# Patient Record
Sex: FEMALE | Race: WHITE | NOT HISPANIC OR LATINO | Employment: OTHER | URBAN - METROPOLITAN AREA
[De-identification: names, ages, dates, MRNs, and addresses within clinical notes are randomized per-mention and may not be internally consistent; named-entity substitution may affect disease eponyms.]

---

## 2023-06-19 PROBLEM — K63.5 COLON POLYPS: Status: ACTIVE | Noted: 2023-06-19

## 2023-06-19 PROBLEM — M47.26 OSTEOARTHRITIS OF SPINE WITH RADICULOPATHY, LUMBAR REGION: Status: ACTIVE | Noted: 2023-06-19

## 2023-06-19 PROBLEM — M85.80 OSTEOPENIA: Status: ACTIVE | Noted: 2023-06-19

## 2023-06-19 PROBLEM — F51.01 INSOMNIA, IDIOPATHIC: Status: ACTIVE | Noted: 2023-06-19

## 2023-06-19 PROBLEM — I25.10 ASCVD (ARTERIOSCLEROTIC CARDIOVASCULAR DISEASE): Status: ACTIVE | Noted: 2023-06-19

## 2023-06-19 PROBLEM — G62.9 PERIPHERAL NEUROPATHY: Status: ACTIVE | Noted: 2023-06-19

## 2023-06-19 PROBLEM — N18.31 CKD STAGE G3A/A1, GFR 45-59 AND ALBUMIN CREATININE RATIO <30 MG/G (MULTI): Status: ACTIVE | Noted: 2023-06-19

## 2023-06-19 PROBLEM — I10 HYPERTENSION: Status: ACTIVE | Noted: 2023-06-19

## 2023-06-19 PROBLEM — K21.9 GERD (GASTROESOPHAGEAL REFLUX DISEASE): Status: ACTIVE | Noted: 2023-06-19

## 2023-06-19 PROBLEM — I10 BENIGN ESSENTIAL HYPERTENSION: Status: ACTIVE | Noted: 2023-06-19

## 2023-06-19 PROBLEM — M18.12 ARTHRITIS OF CARPOMETACARPAL (CMC) JOINT OF LEFT THUMB: Status: ACTIVE | Noted: 2023-06-19

## 2023-06-19 PROBLEM — E78.5 HYPERLIPIDEMIA: Status: ACTIVE | Noted: 2023-06-19

## 2023-06-19 PROBLEM — M48.00 MULTILEVEL FORAMINAL STENOSIS: Status: ACTIVE | Noted: 2023-06-19

## 2023-06-19 PROBLEM — F32.5 DEPRESSION, MAJOR, IN REMISSION (CMS-HCC): Status: ACTIVE | Noted: 2023-06-19

## 2023-06-19 PROBLEM — H81.10 BPV (BENIGN POSITIONAL VERTIGO): Status: ACTIVE | Noted: 2023-06-19

## 2023-06-19 PROBLEM — M72.2 PLANTAR FASCIITIS OF RIGHT FOOT: Status: ACTIVE | Noted: 2023-06-19

## 2023-06-19 RX ORDER — MELOXICAM 15 MG/1
1 TABLET ORAL DAILY
COMMUNITY
Start: 2014-02-14 | End: 2023-07-18 | Stop reason: SDUPTHER

## 2023-06-19 RX ORDER — ESCITALOPRAM OXALATE 20 MG/1
1 TABLET ORAL DAILY
COMMUNITY
Start: 2020-10-01 | End: 2023-12-19 | Stop reason: SDUPTHER

## 2023-06-19 RX ORDER — LISINOPRIL AND HYDROCHLOROTHIAZIDE 10; 12.5 MG/1; MG/1
1 TABLET ORAL DAILY
COMMUNITY
Start: 2019-09-17 | End: 2023-09-11

## 2023-06-19 RX ORDER — ASCORBIC ACID 500 MG
1 TABLET ORAL DAILY
COMMUNITY

## 2023-06-19 RX ORDER — MECLIZINE HYDROCHLORIDE 25 MG/1
TABLET ORAL
COMMUNITY
Start: 2022-06-21 | End: 2024-04-02 | Stop reason: ALTCHOICE

## 2023-06-19 RX ORDER — ACETAMINOPHEN 500 MG
TABLET ORAL
COMMUNITY
Start: 2020-12-01

## 2023-06-19 RX ORDER — OMEPRAZOLE 20 MG/1
1 CAPSULE, DELAYED RELEASE ORAL 2 TIMES DAILY
COMMUNITY
Start: 2014-02-26 | End: 2023-07-18 | Stop reason: SDUPTHER

## 2023-06-19 RX ORDER — ATORVASTATIN CALCIUM 40 MG/1
1 TABLET, FILM COATED ORAL DAILY
COMMUNITY
Start: 2014-05-06 | End: 2023-07-18 | Stop reason: SDUPTHER

## 2023-06-19 RX ORDER — MULTIVITAMIN
1 TABLET ORAL DAILY
COMMUNITY

## 2023-06-19 RX ORDER — NABUMETONE 500 MG/1
1 TABLET, FILM COATED ORAL 2 TIMES DAILY
COMMUNITY
Start: 2022-06-21 | End: 2024-04-02 | Stop reason: ALTCHOICE

## 2023-06-20 ENCOUNTER — OFFICE VISIT (OUTPATIENT)
Dept: PRIMARY CARE | Facility: CLINIC | Age: 73
End: 2023-06-20
Payer: MEDICARE

## 2023-06-20 VITALS — WEIGHT: 212 LBS | SYSTOLIC BLOOD PRESSURE: 100 MMHG | BODY MASS INDEX: 40.06 KG/M2 | DIASTOLIC BLOOD PRESSURE: 70 MMHG

## 2023-06-20 DIAGNOSIS — F32.5 DEPRESSION, MAJOR, IN REMISSION (CMS-HCC): ICD-10-CM

## 2023-06-20 DIAGNOSIS — E78.5 HYPERLIPIDEMIA, UNSPECIFIED HYPERLIPIDEMIA TYPE: ICD-10-CM

## 2023-06-20 DIAGNOSIS — Z00.00 ROUTINE GENERAL MEDICAL EXAMINATION AT HEALTH CARE FACILITY: Primary | ICD-10-CM

## 2023-06-20 DIAGNOSIS — Z12.11 SCREENING FOR COLORECTAL CANCER: ICD-10-CM

## 2023-06-20 DIAGNOSIS — Z12.12 SCREENING FOR COLORECTAL CANCER: ICD-10-CM

## 2023-06-20 DIAGNOSIS — I10 HYPERTENSION, UNSPECIFIED TYPE: ICD-10-CM

## 2023-06-20 DIAGNOSIS — Z00.00 MEDICARE ANNUAL WELLNESS VISIT, SUBSEQUENT: ICD-10-CM

## 2023-06-20 PROCEDURE — 3074F SYST BP LT 130 MM HG: CPT | Performed by: STUDENT IN AN ORGANIZED HEALTH CARE EDUCATION/TRAINING PROGRAM

## 2023-06-20 PROCEDURE — G0439 PPPS, SUBSEQ VISIT: HCPCS | Performed by: STUDENT IN AN ORGANIZED HEALTH CARE EDUCATION/TRAINING PROGRAM

## 2023-06-20 PROCEDURE — 3078F DIAST BP <80 MM HG: CPT | Performed by: STUDENT IN AN ORGANIZED HEALTH CARE EDUCATION/TRAINING PROGRAM

## 2023-06-20 PROCEDURE — 1160F RVW MEDS BY RX/DR IN RCRD: CPT | Performed by: STUDENT IN AN ORGANIZED HEALTH CARE EDUCATION/TRAINING PROGRAM

## 2023-06-20 PROCEDURE — 99203 OFFICE O/P NEW LOW 30 MIN: CPT | Performed by: STUDENT IN AN ORGANIZED HEALTH CARE EDUCATION/TRAINING PROGRAM

## 2023-06-20 PROCEDURE — 1159F MED LIST DOCD IN RCRD: CPT | Performed by: STUDENT IN AN ORGANIZED HEALTH CARE EDUCATION/TRAINING PROGRAM

## 2023-06-20 PROCEDURE — 1170F FXNL STATUS ASSESSED: CPT | Performed by: STUDENT IN AN ORGANIZED HEALTH CARE EDUCATION/TRAINING PROGRAM

## 2023-06-20 PROCEDURE — 1036F TOBACCO NON-USER: CPT | Performed by: STUDENT IN AN ORGANIZED HEALTH CARE EDUCATION/TRAINING PROGRAM

## 2023-06-20 PROCEDURE — 3008F BODY MASS INDEX DOCD: CPT | Performed by: STUDENT IN AN ORGANIZED HEALTH CARE EDUCATION/TRAINING PROGRAM

## 2023-06-20 ASSESSMENT — PATIENT HEALTH QUESTIONNAIRE - PHQ9
1. LITTLE INTEREST OR PLEASURE IN DOING THINGS: NOT AT ALL
2. FEELING DOWN, DEPRESSED OR HOPELESS: NOT AT ALL
SUM OF ALL RESPONSES TO PHQ9 QUESTIONS 1 AND 2: 0

## 2023-06-20 ASSESSMENT — ACTIVITIES OF DAILY LIVING (ADL)
MANAGING_FINANCES: INDEPENDENT
GROCERY_SHOPPING: INDEPENDENT
DRESSING: INDEPENDENT
BATHING: INDEPENDENT
TAKING_MEDICATION: INDEPENDENT
DOING_HOUSEWORK: INDEPENDENT

## 2023-06-20 ASSESSMENT — ENCOUNTER SYMPTOMS
OCCASIONAL FEELINGS OF UNSTEADINESS: 0
LOSS OF SENSATION IN FEET: 0
DEPRESSION: 0

## 2023-06-20 NOTE — PROGRESS NOTES
Subjective   Reason for Visit: Loretta Walsh is an 72 y.o. female here for a Medicare Wellness visit.          Reviewed all medications by prescribing practitioner or clinical pharmacist (such as prescriptions, OTCs, herbal therapies and supplements) and documented in the medical record.    HPI    Presents to establish care    Past medical history: Hypertension, dyslipidemia, depression, neuropathy   Past surgical history: Spinal fusion, (Dr. Cervantes) knee replacements bilaterally, carpel tunnel bilaterally, C section  Social history: , 1 child, retired pharmacy tech  No known drug allergies  Family history: memory issues, father with diabetes, mother with ovarian cancer, both parents have passed way, hypertension     Patient Care Team:  Fuentes Gallegos DO as PCP - General  Fuentes Gallegos DO as PCP - [facility] Attributed Provider     Review of Systems  8 point review of systems is otherwise negative unless mentioned on HPI      Objective   Vitals:  /70   Wt 96.2 kg (212 lb)   BMI 40.06 kg/m²       Physical Exam  General: No acute distress  HEENT: Spot of erythema behind left ear  CV: Regular rate and rhythm, normal S1 and S2, no murmurs  Pulm: Clear to auscultation bilaterally, no wheezings, rales or rhonchi  Abd: Nondistended  MSK: 5/5 strength in all extremities  Skin: No rashes   Lymphatic: No lymphadenopathy      Assessment/Plan   Problem List Items Addressed This Visit    None  Visit Diagnoses       Routine general medical examination at health care facility    -  Primary            Hypertension  -Continue lisinopril/hydrochlorothiazide 10/12.5 mg daily    Dyslipidemia  -Continue atorvastatin 40 mg daily  -Lipid panel ordered  -Coronary artery calcium score ordered, reports having one around 15 years ago with reassuring results    Depression  -Lexapro was started 4 years ago after the passing away of a parent, would like to start tapering the medication, plan to decrease to 10 mg daily and will  consider discontinuing from there    Hand pain/thumb pain  -Has been on meloxicam as needed  -Has a follow-up tomorrow scheduled with Ortho-hand    Healthcare maintenance  -Colonoscopy 2020, repeat interval of 10 years, had not been recommended for any further colonoscopies  -Mammogram 2/2023  -Routine labs ordered    RTC 6 months    This note was dictated by speech recognition. Minor errors in transcription may be present.

## 2023-06-20 NOTE — PROGRESS NOTES
Subjective   Reason for Visit: Loretta Walsh is an 72 y.o. female here for a Medicare Wellness visit.          Reviewed all medications by prescribing practitioner or clinical pharmacist (such as prescriptions, OTCs, herbal therapies and supplements) and documented in the medical record.    HPI    Patient Care Team:  Fuentes Gallegos DO as PCP - General  Fuentes Gallegos DO as PCP - [facility]     Review of Systems    Objective   Vitals:  /70   Wt 96.2 kg (212 lb)   BMI 40.06 kg/m²       Physical Exam    Assessment/Plan   Problem List Items Addressed This Visit    None

## 2023-07-18 DIAGNOSIS — Z00.00 HEALTHCARE MAINTENANCE: ICD-10-CM

## 2023-07-18 DIAGNOSIS — M19.90 ARTHRITIS: ICD-10-CM

## 2023-07-18 DIAGNOSIS — E78.5 DYSLIPIDEMIA: Primary | ICD-10-CM

## 2023-07-18 RX ORDER — GABAPENTIN 100 MG/1
CAPSULE ORAL
COMMUNITY
Start: 2023-06-20 | End: 2023-07-18 | Stop reason: SDUPTHER

## 2023-07-19 RX ORDER — OMEPRAZOLE 20 MG/1
20 CAPSULE, DELAYED RELEASE ORAL 2 TIMES DAILY
Qty: 180 CAPSULE | Refills: 1 | Status: SHIPPED | OUTPATIENT
Start: 2023-07-19 | End: 2023-12-19 | Stop reason: SDUPTHER

## 2023-07-19 RX ORDER — ATORVASTATIN CALCIUM 40 MG/1
40 TABLET, FILM COATED ORAL DAILY
Qty: 90 TABLET | Refills: 1 | Status: SHIPPED | OUTPATIENT
Start: 2023-07-19 | End: 2023-12-19 | Stop reason: SDUPTHER

## 2023-07-19 RX ORDER — GABAPENTIN 100 MG/1
100 CAPSULE ORAL 3 TIMES DAILY
Qty: 270 CAPSULE | Refills: 1 | Status: SHIPPED | OUTPATIENT
Start: 2023-07-19 | End: 2023-09-21 | Stop reason: ALTCHOICE

## 2023-07-19 RX ORDER — MELOXICAM 15 MG/1
15 TABLET ORAL DAILY
Qty: 90 TABLET | Refills: 1 | Status: SHIPPED | OUTPATIENT
Start: 2023-07-19 | End: 2023-12-19 | Stop reason: SDUPTHER

## 2023-09-09 DIAGNOSIS — I10 ESSENTIAL (PRIMARY) HYPERTENSION: ICD-10-CM

## 2023-09-11 RX ORDER — LISINOPRIL AND HYDROCHLOROTHIAZIDE 10; 12.5 MG/1; MG/1
1 TABLET ORAL DAILY
Qty: 90 TABLET | Refills: 0 | Status: SHIPPED | OUTPATIENT
Start: 2023-09-11 | End: 2023-12-12

## 2023-09-21 ENCOUNTER — TELEPHONE (OUTPATIENT)
Dept: PRIMARY CARE | Facility: CLINIC | Age: 73
End: 2023-09-21
Payer: MEDICARE

## 2023-09-21 DIAGNOSIS — G62.89 OTHER POLYNEUROPATHY: ICD-10-CM

## 2023-09-21 DIAGNOSIS — M47.26 OSTEOARTHRITIS OF SPINE WITH RADICULOPATHY, LUMBAR REGION: Primary | ICD-10-CM

## 2023-09-21 DIAGNOSIS — G62.89 OTHER POLYNEUROPATHY: Primary | ICD-10-CM

## 2023-09-21 RX ORDER — GABAPENTIN 100 MG/1
100 CAPSULE ORAL NIGHTLY
Qty: 90 CAPSULE | Refills: 1 | Status: SHIPPED | OUTPATIENT
Start: 2023-09-21 | End: 2023-09-21 | Stop reason: ALTCHOICE

## 2023-09-21 RX ORDER — GABAPENTIN 300 MG/1
300 CAPSULE ORAL NIGHTLY
Qty: 90 CAPSULE | Refills: 1 | Status: SHIPPED | OUTPATIENT
Start: 2023-09-21 | End: 2023-12-19 | Stop reason: SDUPTHER

## 2023-09-21 NOTE — TELEPHONE ENCOUNTER
Currently patient is taking gabapentin 100mg tid. Asking if it could be changed to 300mg and 1 tab qd

## 2023-11-08 ENCOUNTER — APPOINTMENT (OUTPATIENT)
Dept: ORTHOPEDIC SURGERY | Facility: CLINIC | Age: 73
End: 2023-11-08
Payer: MEDICARE

## 2023-11-15 ENCOUNTER — APPOINTMENT (OUTPATIENT)
Dept: ORTHOPEDIC SURGERY | Facility: CLINIC | Age: 73
End: 2023-11-15
Payer: MEDICARE

## 2023-11-20 ENCOUNTER — OFFICE VISIT (OUTPATIENT)
Dept: ORTHOPEDIC SURGERY | Facility: CLINIC | Age: 73
End: 2023-11-20
Payer: MEDICARE

## 2023-11-20 VITALS — BODY MASS INDEX: 39.01 KG/M2 | WEIGHT: 212 LBS | HEIGHT: 62 IN

## 2023-11-20 DIAGNOSIS — M79.644 BILATERAL THUMB PAIN: Primary | ICD-10-CM

## 2023-11-20 DIAGNOSIS — M79.645 BILATERAL THUMB PAIN: Primary | ICD-10-CM

## 2023-11-20 DIAGNOSIS — M18.0 PRIMARY OSTEOARTHRITIS OF BOTH FIRST CARPOMETACARPAL JOINTS: ICD-10-CM

## 2023-11-20 PROCEDURE — 20600 DRAIN/INJ JOINT/BURSA W/O US: CPT | Performed by: ORTHOPAEDIC SURGERY

## 2023-11-20 PROCEDURE — 1160F RVW MEDS BY RX/DR IN RCRD: CPT | Performed by: ORTHOPAEDIC SURGERY

## 2023-11-20 PROCEDURE — 1159F MED LIST DOCD IN RCRD: CPT | Performed by: ORTHOPAEDIC SURGERY

## 2023-11-20 PROCEDURE — 3008F BODY MASS INDEX DOCD: CPT | Performed by: ORTHOPAEDIC SURGERY

## 2023-11-20 PROCEDURE — 1036F TOBACCO NON-USER: CPT | Performed by: ORTHOPAEDIC SURGERY

## 2023-11-20 PROCEDURE — 99213 OFFICE O/P EST LOW 20 MIN: CPT | Performed by: ORTHOPAEDIC SURGERY

## 2023-11-20 RX ORDER — TRIAMCINOLONE ACETONIDE 40 MG/ML
5 INJECTION, SUSPENSION INTRA-ARTICULAR; INTRAMUSCULAR
Status: COMPLETED | OUTPATIENT
Start: 2023-11-20 | End: 2023-11-20

## 2023-11-20 RX ORDER — LIDOCAINE HYDROCHLORIDE 20 MG/ML
0.5 INJECTION, SOLUTION INFILTRATION; PERINEURAL
Status: COMPLETED | OUTPATIENT
Start: 2023-11-20 | End: 2023-11-20

## 2023-11-20 RX ADMIN — LIDOCAINE HYDROCHLORIDE 0.5 ML: 20 INJECTION, SOLUTION INFILTRATION; PERINEURAL at 10:36

## 2023-11-20 RX ADMIN — TRIAMCINOLONE ACETONIDE 5 MG: 40 INJECTION, SUSPENSION INTRA-ARTICULAR; INTRAMUSCULAR at 10:36

## 2023-11-20 NOTE — PROGRESS NOTES
Subjective    Patient ID: Loretta Walsh is a 73 y.o. female.    Chief Complaint: OTHER (F/U BILAT CMC/)     Last Surgery: No surgery found  Last Surgery Date: No surgery found    HPI  Patient comes in today for follow-up of her bilateral first CMC arthritis.  She is choosing to treat this conservatively as she is recovering from spine surgery.  She states the injections are providing just enough relief to let her use her hands for activities of daily living.    Objective   Ortho Exam  Patient is in no acute distress.  Exam of her left hand and wrist reveals her skin envelope is intact.  She has clinical evidence of radial subluxation of the first metacarpal.  She is tender at the first CMC joint.  She has a negative Finkelstein test.  She has a positive first CMC grind.  She has no evidence of a trigger thumb.    Exam of her right hand and wrist reveals her skin envelope is intact.  She does have evidence of radial subluxation of the first metacarpal base.  She is tender at the first CMC joints.  She has a negative Finkelstein test.  She has a positive first CMC grind.    Assessment/Plan   Encounter Diagnoses:  Bilateral thumb pain    Primary osteoarthritis of both first carpometacarpal joints    Patient has known bilateral first CMC arthritis.  She has elected to undergo another set of injections today in the office.    S Inj/Asp: bilateral thumb CMC on 11/20/2023 10:36 AM  Indications: joint swelling  Details: 25 G needle, radial approach  Medications (Right): 5 mg triamcinolone acetonide 40 mg/mL; 0.5 mL lidocaine 20 mg/mL (2 %)  Medications (Left): 5 mg triamcinolone acetonide 40 mg/mL; 0.5 mL lidocaine 20 mg/mL (2 %)  Outcome: tolerated well, no immediate complications  Procedure, treatment alternatives, risks and benefits explained, specific risks discussed. Consent was given by the patient. Immediately prior to procedure a time out was called to verify the correct patient, procedure, equipment, support staff  and site/side marked as required. Patient was prepped and draped in the usual sterile fashion.       Patient was informed will take about a week for the injections have an effect.  She will follow-up as her symptoms dictate.

## 2023-12-11 DIAGNOSIS — I10 ESSENTIAL (PRIMARY) HYPERTENSION: ICD-10-CM

## 2023-12-12 RX ORDER — LISINOPRIL AND HYDROCHLOROTHIAZIDE 10; 12.5 MG/1; MG/1
1 TABLET ORAL DAILY
Qty: 30 TABLET | Refills: 0 | Status: SHIPPED | OUTPATIENT
Start: 2023-12-12 | End: 2024-01-08

## 2023-12-19 ENCOUNTER — OFFICE VISIT (OUTPATIENT)
Dept: PRIMARY CARE | Facility: CLINIC | Age: 73
End: 2023-12-19
Payer: MEDICARE

## 2023-12-19 ENCOUNTER — LAB (OUTPATIENT)
Dept: LAB | Facility: LAB | Age: 73
End: 2023-12-19
Payer: MEDICARE

## 2023-12-19 VITALS
WEIGHT: 208 LBS | SYSTOLIC BLOOD PRESSURE: 110 MMHG | HEIGHT: 62 IN | DIASTOLIC BLOOD PRESSURE: 72 MMHG | HEART RATE: 57 BPM | BODY MASS INDEX: 38.28 KG/M2 | OXYGEN SATURATION: 98 %

## 2023-12-19 DIAGNOSIS — F32.5 DEPRESSION, MAJOR, IN REMISSION (CMS-HCC): ICD-10-CM

## 2023-12-19 DIAGNOSIS — Z12.11 SCREENING FOR COLORECTAL CANCER: ICD-10-CM

## 2023-12-19 DIAGNOSIS — Z00.00 HEALTHCARE MAINTENANCE: ICD-10-CM

## 2023-12-19 DIAGNOSIS — E78.5 DYSLIPIDEMIA: ICD-10-CM

## 2023-12-19 DIAGNOSIS — Z12.31 ENCOUNTER FOR SCREENING MAMMOGRAM FOR MALIGNANT NEOPLASM OF BREAST: ICD-10-CM

## 2023-12-19 DIAGNOSIS — M19.90 ARTHRITIS: ICD-10-CM

## 2023-12-19 DIAGNOSIS — Z12.12 SCREENING FOR COLORECTAL CANCER: ICD-10-CM

## 2023-12-19 DIAGNOSIS — G62.89 OTHER POLYNEUROPATHY: Primary | ICD-10-CM

## 2023-12-19 DIAGNOSIS — Z00.00 ROUTINE GENERAL MEDICAL EXAMINATION AT HEALTH CARE FACILITY: ICD-10-CM

## 2023-12-19 DIAGNOSIS — E55.9 VITAMIN D DEFICIENCY: ICD-10-CM

## 2023-12-19 LAB
25(OH)D3 SERPL-MCNC: 27 NG/ML (ref 30–100)
ALBUMIN SERPL BCP-MCNC: 4.5 G/DL (ref 3.4–5)
ALP SERPL-CCNC: 96 U/L (ref 33–136)
ALT SERPL W P-5'-P-CCNC: 19 U/L (ref 7–45)
ANION GAP SERPL CALC-SCNC: 13 MMOL/L (ref 10–20)
AST SERPL W P-5'-P-CCNC: 20 U/L (ref 9–39)
BILIRUB SERPL-MCNC: 0.9 MG/DL (ref 0–1.2)
BUN SERPL-MCNC: 24 MG/DL (ref 6–23)
CALCIUM SERPL-MCNC: 10 MG/DL (ref 8.6–10.6)
CHLORIDE SERPL-SCNC: 98 MMOL/L (ref 98–107)
CHOLEST SERPL-MCNC: 178 MG/DL (ref 0–199)
CHOLESTEROL/HDL RATIO: 3.4
CO2 SERPL-SCNC: 28 MMOL/L (ref 21–32)
CREAT SERPL-MCNC: 1.16 MG/DL (ref 0.5–1.05)
ERYTHROCYTE [DISTWIDTH] IN BLOOD BY AUTOMATED COUNT: 12.9 % (ref 11.5–14.5)
GFR SERPL CREATININE-BSD FRML MDRD: 50 ML/MIN/1.73M*2
GLUCOSE SERPL-MCNC: 88 MG/DL (ref 74–99)
HCT VFR BLD AUTO: 39.2 % (ref 36–46)
HDLC SERPL-MCNC: 52.3 MG/DL
HGB BLD-MCNC: 12.6 G/DL (ref 12–16)
LDLC SERPL CALC-MCNC: 100 MG/DL
MCH RBC QN AUTO: 30.7 PG (ref 26–34)
MCHC RBC AUTO-ENTMCNC: 32.1 G/DL (ref 32–36)
MCV RBC AUTO: 95 FL (ref 80–100)
NON HDL CHOLESTEROL: 126 MG/DL (ref 0–149)
NRBC BLD-RTO: 0 /100 WBCS (ref 0–0)
PLATELET # BLD AUTO: 302 X10*3/UL (ref 150–450)
POTASSIUM SERPL-SCNC: 4.5 MMOL/L (ref 3.5–5.3)
PROT SERPL-MCNC: 7.5 G/DL (ref 6.4–8.2)
RBC # BLD AUTO: 4.11 X10*6/UL (ref 4–5.2)
SODIUM SERPL-SCNC: 134 MMOL/L (ref 136–145)
TRIGL SERPL-MCNC: 130 MG/DL (ref 0–149)
TSH SERPL-ACNC: 1.51 MIU/L (ref 0.44–3.98)
VLDL: 26 MG/DL (ref 0–40)
WBC # BLD AUTO: 7.1 X10*3/UL (ref 4.4–11.3)

## 2023-12-19 PROCEDURE — 3074F SYST BP LT 130 MM HG: CPT | Performed by: STUDENT IN AN ORGANIZED HEALTH CARE EDUCATION/TRAINING PROGRAM

## 2023-12-19 PROCEDURE — 99213 OFFICE O/P EST LOW 20 MIN: CPT | Performed by: STUDENT IN AN ORGANIZED HEALTH CARE EDUCATION/TRAINING PROGRAM

## 2023-12-19 PROCEDURE — 36415 COLL VENOUS BLD VENIPUNCTURE: CPT

## 2023-12-19 PROCEDURE — 1036F TOBACCO NON-USER: CPT | Performed by: STUDENT IN AN ORGANIZED HEALTH CARE EDUCATION/TRAINING PROGRAM

## 2023-12-19 PROCEDURE — 82306 VITAMIN D 25 HYDROXY: CPT

## 2023-12-19 PROCEDURE — 3078F DIAST BP <80 MM HG: CPT | Performed by: STUDENT IN AN ORGANIZED HEALTH CARE EDUCATION/TRAINING PROGRAM

## 2023-12-19 PROCEDURE — 1159F MED LIST DOCD IN RCRD: CPT | Performed by: STUDENT IN AN ORGANIZED HEALTH CARE EDUCATION/TRAINING PROGRAM

## 2023-12-19 PROCEDURE — 80053 COMPREHEN METABOLIC PANEL: CPT

## 2023-12-19 PROCEDURE — 84443 ASSAY THYROID STIM HORMONE: CPT

## 2023-12-19 PROCEDURE — 3008F BODY MASS INDEX DOCD: CPT | Performed by: STUDENT IN AN ORGANIZED HEALTH CARE EDUCATION/TRAINING PROGRAM

## 2023-12-19 PROCEDURE — 85027 COMPLETE CBC AUTOMATED: CPT

## 2023-12-19 PROCEDURE — 1160F RVW MEDS BY RX/DR IN RCRD: CPT | Performed by: STUDENT IN AN ORGANIZED HEALTH CARE EDUCATION/TRAINING PROGRAM

## 2023-12-19 PROCEDURE — 80061 LIPID PANEL: CPT

## 2023-12-19 RX ORDER — GABAPENTIN 300 MG/1
300 CAPSULE ORAL NIGHTLY
Qty: 90 CAPSULE | Refills: 1 | Status: SHIPPED | OUTPATIENT
Start: 2023-12-19 | End: 2024-06-16

## 2023-12-19 RX ORDER — MELOXICAM 15 MG/1
15 TABLET ORAL DAILY
Qty: 90 TABLET | Refills: 1 | Status: SHIPPED | OUTPATIENT
Start: 2023-12-19 | End: 2024-04-16

## 2023-12-19 RX ORDER — ATORVASTATIN CALCIUM 40 MG/1
40 TABLET, FILM COATED ORAL DAILY
Qty: 90 TABLET | Refills: 1 | Status: SHIPPED | OUTPATIENT
Start: 2023-12-19 | End: 2024-04-16

## 2023-12-19 RX ORDER — ESCITALOPRAM OXALATE 20 MG/1
20 TABLET ORAL DAILY
Qty: 90 TABLET | Refills: 1 | Status: SHIPPED | OUTPATIENT
Start: 2023-12-19 | End: 2024-04-02 | Stop reason: ALTCHOICE

## 2023-12-19 RX ORDER — OMEPRAZOLE 20 MG/1
20 CAPSULE, DELAYED RELEASE ORAL 2 TIMES DAILY
Qty: 180 CAPSULE | Refills: 1 | Status: SHIPPED | OUTPATIENT
Start: 2023-12-19 | End: 2024-04-16

## 2023-12-19 NOTE — PROGRESS NOTES
"Follow up  Med refills    Subjective   Patient ID: Loretta Walsh is a 73 y.o. female who presents for Follow-up and Med Refill.    HPI     Presents for follow-up, medication refill, lab work.  Reports has been doing well.  After last visit had decreased the dose of Lexapro, and ended up returning to prior dosing.    Review of Systems    8 point review of systems is otherwise negative unless mentioned on HPI      Objective   /72   Pulse 57   Ht 1.575 m (5' 2\")   Wt 94.3 kg (208 lb)   SpO2 98%   BMI 38.04 kg/m²     Physical Exam    General: No acute distress  HEENT: EOMI  CV: Regular rate and rhythm, normal S1 and S2, no murmurs  Pulm: Clear to auscultation bilaterally, no wheezings, rales or rhonchi  Abd: Nondistended  MSK: 5/5 strength in all extremities  Skin: No rashes   Lymphatic: No lymphadenopathy      Assessment/Plan       Hypertension  -Continue lisinopril/hydrochlorothiazide 10/12.5 mg daily     Dyslipidemia  -Continue atorvastatin 40 mg daily  -Coronary artery calcium score of 190 in 8/2023     Depression  -After last visit had decreased Lexapro to 10 mg daily, had return of symptoms and has since resumed 20 mg daily     Hand pain/thumb pain  -Has been on meloxicam as needed  -Has a follow-up tomorrow scheduled with [facility] maintenance  -Colonoscopy 2020, repeat interval of 10 years, had not been recommended for any further colonoscopies  -Mammogram 2/2023, ordered for next year  -Routine labs ordered     RTC 6 months     This note was dictated by speech recognition. Minor errors in transcription may be present.       "

## 2023-12-22 ENCOUNTER — TELEPHONE (OUTPATIENT)
Dept: PRIMARY CARE | Facility: CLINIC | Age: 73
End: 2023-12-22
Payer: MEDICARE

## 2023-12-22 NOTE — TELEPHONE ENCOUNTER
----- Message from Topher Blackwood MD sent at 12/22/2023  7:46 AM EST -----  Please let Loretta know that all labs are around her baseline. LDL is exactly the same as last year at 100. Vitamin D was just a little low at 27 which is likely due to it being winter. No medications changes needed, thanks

## 2024-01-07 DIAGNOSIS — I10 ESSENTIAL (PRIMARY) HYPERTENSION: ICD-10-CM

## 2024-01-08 RX ORDER — LISINOPRIL AND HYDROCHLOROTHIAZIDE 10; 12.5 MG/1; MG/1
1 TABLET ORAL DAILY
Qty: 90 TABLET | Refills: 1 | Status: SHIPPED | OUTPATIENT
Start: 2024-01-08 | End: 2024-04-16

## 2024-02-15 ENCOUNTER — HOSPITAL ENCOUNTER (OUTPATIENT)
Dept: RADIOLOGY | Facility: CLINIC | Age: 74
Discharge: HOME | End: 2024-02-15
Payer: MEDICARE

## 2024-02-15 DIAGNOSIS — Z12.31 ENCOUNTER FOR SCREENING MAMMOGRAM FOR MALIGNANT NEOPLASM OF BREAST: ICD-10-CM

## 2024-02-15 PROCEDURE — 77067 SCR MAMMO BI INCL CAD: CPT

## 2024-02-15 PROCEDURE — 77063 BREAST TOMOSYNTHESIS BI: CPT | Performed by: RADIOLOGY

## 2024-02-15 PROCEDURE — 77067 SCR MAMMO BI INCL CAD: CPT | Performed by: RADIOLOGY

## 2024-02-19 ENCOUNTER — TELEPHONE (OUTPATIENT)
Dept: PRIMARY CARE | Facility: CLINIC | Age: 74
End: 2024-02-19
Payer: MEDICARE

## 2024-02-19 NOTE — TELEPHONE ENCOUNTER
----- Message from Topher Blackwood MD sent at 2/16/2024  8:42 AM EST -----  Please let Loretta know that her mammogram is unchanged compared to prior and can continue with yearly screening, thanks

## 2024-02-22 ENCOUNTER — APPOINTMENT (OUTPATIENT)
Dept: RADIOLOGY | Facility: CLINIC | Age: 74
End: 2024-02-22
Payer: MEDICARE

## 2024-02-27 ENCOUNTER — TELEPHONE (OUTPATIENT)
Dept: PRIMARY CARE | Facility: CLINIC | Age: 74
End: 2024-02-27
Payer: MEDICARE

## 2024-02-27 DIAGNOSIS — K57.92 DIVERTICULITIS: Primary | ICD-10-CM

## 2024-02-27 RX ORDER — AMOXICILLIN AND CLAVULANATE POTASSIUM 875; 125 MG/1; MG/1
875 TABLET, FILM COATED ORAL 2 TIMES DAILY
Qty: 20 TABLET | Refills: 0 | Status: SHIPPED | OUTPATIENT
Start: 2024-02-27 | End: 2024-03-08

## 2024-02-27 NOTE — TELEPHONE ENCOUNTER
has pain from diverticulitis , has had it before and knows the symptoms. Asking if you could call something in for her

## 2024-04-02 ENCOUNTER — OFFICE VISIT (OUTPATIENT)
Dept: NEUROSURGERY | Facility: CLINIC | Age: 74
End: 2024-04-02
Payer: MEDICARE

## 2024-04-02 VITALS
WEIGHT: 208 LBS | BODY MASS INDEX: 39.27 KG/M2 | SYSTOLIC BLOOD PRESSURE: 111 MMHG | DIASTOLIC BLOOD PRESSURE: 71 MMHG | HEART RATE: 69 BPM | RESPIRATION RATE: 18 BRPM | TEMPERATURE: 97.2 F | HEIGHT: 61 IN

## 2024-04-02 DIAGNOSIS — M54.16 LUMBAR RADICULOPATHY: Primary | ICD-10-CM

## 2024-04-02 DIAGNOSIS — R20.2 BILATERAL LEG PARESTHESIA: ICD-10-CM

## 2024-04-02 PROCEDURE — 1036F TOBACCO NON-USER: CPT | Performed by: NURSE PRACTITIONER

## 2024-04-02 PROCEDURE — 1160F RVW MEDS BY RX/DR IN RCRD: CPT | Performed by: NURSE PRACTITIONER

## 2024-04-02 PROCEDURE — 3074F SYST BP LT 130 MM HG: CPT | Performed by: NURSE PRACTITIONER

## 2024-04-02 PROCEDURE — 99213 OFFICE O/P EST LOW 20 MIN: CPT | Performed by: NURSE PRACTITIONER

## 2024-04-02 PROCEDURE — 3008F BODY MASS INDEX DOCD: CPT | Performed by: NURSE PRACTITIONER

## 2024-04-02 PROCEDURE — 1125F AMNT PAIN NOTED PAIN PRSNT: CPT | Performed by: NURSE PRACTITIONER

## 2024-04-02 PROCEDURE — 3078F DIAST BP <80 MM HG: CPT | Performed by: NURSE PRACTITIONER

## 2024-04-02 PROCEDURE — 1159F MED LIST DOCD IN RCRD: CPT | Performed by: NURSE PRACTITIONER

## 2024-04-02 SDOH — ECONOMIC STABILITY: FOOD INSECURITY: WITHIN THE PAST 12 MONTHS, YOU WORRIED THAT YOUR FOOD WOULD RUN OUT BEFORE YOU GOT MONEY TO BUY MORE.: NEVER TRUE

## 2024-04-02 SDOH — ECONOMIC STABILITY: FOOD INSECURITY: WITHIN THE PAST 12 MONTHS, THE FOOD YOU BOUGHT JUST DIDN'T LAST AND YOU DIDN'T HAVE MONEY TO GET MORE.: NEVER TRUE

## 2024-04-02 ASSESSMENT — LIFESTYLE VARIABLES
HOW MANY STANDARD DRINKS CONTAINING ALCOHOL DO YOU HAVE ON A TYPICAL DAY: 1 OR 2
HOW OFTEN DO YOU HAVE A DRINK CONTAINING ALCOHOL: MONTHLY OR LESS
HOW OFTEN DO YOU HAVE SIX OR MORE DRINKS ON ONE OCCASION: NEVER
SKIP TO QUESTIONS 9-10: 1
AUDIT-C TOTAL SCORE: 1

## 2024-04-02 ASSESSMENT — COLUMBIA-SUICIDE SEVERITY RATING SCALE - C-SSRS
2. HAVE YOU ACTUALLY HAD ANY THOUGHTS OF KILLING YOURSELF?: NO
6. HAVE YOU EVER DONE ANYTHING, STARTED TO DO ANYTHING, OR PREPARED TO DO ANYTHING TO END YOUR LIFE?: NO
1. IN THE PAST MONTH, HAVE YOU WISHED YOU WERE DEAD OR WISHED YOU COULD GO TO SLEEP AND NOT WAKE UP?: NO

## 2024-04-02 ASSESSMENT — ENCOUNTER SYMPTOMS
DEPRESSION: 0
OCCASIONAL FEELINGS OF UNSTEADINESS: 0
LOSS OF SENSATION IN FEET: 1

## 2024-04-02 ASSESSMENT — PATIENT HEALTH QUESTIONNAIRE - PHQ9
SUM OF ALL RESPONSES TO PHQ9 QUESTIONS 1 & 2: 0
2. FEELING DOWN, DEPRESSED OR HOPELESS: NOT AT ALL
1. LITTLE INTEREST OR PLEASURE IN DOING THINGS: NOT AT ALL

## 2024-04-02 ASSESSMENT — PAIN SCALES - GENERAL: PAINLEVEL: 4

## 2024-04-02 NOTE — PROGRESS NOTES
"Loretta Walsh is here today in follow up for lumbar radiculopathy and to review images.     To review, she was initially evaluated on 05/09/2023. She reported pain in right low back, radiating into right lateral and anterior thigh since falling on 04/25/2023. On exam, she had no neuro deficits. Orders were placed for dynamic lumbar imaging and referral placed to PT.     At her last visit on 06/20/2023, she had improvement with Home Exercise Program. She reported sensation of socks on her feet. She was to start gabapentin for her known peripheral neuropathy with instructions to obtain refills from her PCP. She was to follow up PRN    Today, she reports stabbing pain in her left buttocks radiating into left thigh. For several months, interfering with completion of ADLs. Worsens while sitting on left, while walking on right side. She reports recurrence of right leg heaviness.    TREATMENTS:  Home Exercise Program: continues daily lumbar stretching and core strengthening   NSAIDs  Topical  L4 - S1 instrumented fusion 03/2020 (Dr. Cervantes)    SMOKER: No  ANTICOAGULANT USE: YES: NSAID use    ROS x 10 is, otherwise, negative unless documented in HPI above    /71   Pulse 69   Temp 36.2 °C (97.2 °F)   Resp 18   Ht 1.549 m (5' 1\")   Wt 94.3 kg (208 lb)   BMI 39.30 kg/m²     On Exam: Appears comfortable  A&O x 4, speech clear / fluent  Respirations even / unlabored  Abdomen without distension  MOTOR: 5 / 5  SENSORY: Decreased sensory in bilateral anterior thighs  Gait: is steady    We discussed rationale for MRI L Spine. She has failed conservative management with oral medication, Home Exercise Program.. She agrees to follow up to review MRI findings to discuss surgical vs nonsurgical treatment  Courtney Glover, APRN-CNP          "

## 2024-04-03 ENCOUNTER — HOSPITAL ENCOUNTER (OUTPATIENT)
Dept: RADIOLOGY | Facility: HOSPITAL | Age: 74
Discharge: HOME | End: 2024-04-03
Payer: MEDICARE

## 2024-04-03 DIAGNOSIS — M54.16 LUMBAR RADICULOPATHY: ICD-10-CM

## 2024-04-03 DIAGNOSIS — R20.2 BILATERAL LEG PARESTHESIA: ICD-10-CM

## 2024-04-03 PROCEDURE — 72148 MRI LUMBAR SPINE W/O DYE: CPT | Performed by: RADIOLOGY

## 2024-04-03 PROCEDURE — 72148 MRI LUMBAR SPINE W/O DYE: CPT

## 2024-04-16 ENCOUNTER — OFFICE VISIT (OUTPATIENT)
Dept: NEUROSURGERY | Facility: CLINIC | Age: 74
End: 2024-04-16
Payer: MEDICARE

## 2024-04-16 VITALS
BODY MASS INDEX: 39.27 KG/M2 | HEART RATE: 66 BPM | WEIGHT: 208 LBS | SYSTOLIC BLOOD PRESSURE: 106 MMHG | RESPIRATION RATE: 18 BRPM | DIASTOLIC BLOOD PRESSURE: 64 MMHG | HEIGHT: 61 IN | TEMPERATURE: 96.8 F

## 2024-04-16 DIAGNOSIS — Z00.00 HEALTHCARE MAINTENANCE: ICD-10-CM

## 2024-04-16 DIAGNOSIS — M54.16 LUMBAR RADICULOPATHY: Primary | ICD-10-CM

## 2024-04-16 DIAGNOSIS — I10 ESSENTIAL (PRIMARY) HYPERTENSION: ICD-10-CM

## 2024-04-16 DIAGNOSIS — E78.5 DYSLIPIDEMIA: ICD-10-CM

## 2024-04-16 DIAGNOSIS — M19.90 ARTHRITIS: ICD-10-CM

## 2024-04-16 DIAGNOSIS — R20.2 BILATERAL LEG PARESTHESIA: ICD-10-CM

## 2024-04-16 PROCEDURE — 3078F DIAST BP <80 MM HG: CPT | Performed by: NURSE PRACTITIONER

## 2024-04-16 PROCEDURE — 99213 OFFICE O/P EST LOW 20 MIN: CPT | Performed by: NURSE PRACTITIONER

## 2024-04-16 PROCEDURE — 1125F AMNT PAIN NOTED PAIN PRSNT: CPT | Performed by: NURSE PRACTITIONER

## 2024-04-16 PROCEDURE — 1160F RVW MEDS BY RX/DR IN RCRD: CPT | Performed by: NURSE PRACTITIONER

## 2024-04-16 PROCEDURE — 3008F BODY MASS INDEX DOCD: CPT | Performed by: NURSE PRACTITIONER

## 2024-04-16 PROCEDURE — 1159F MED LIST DOCD IN RCRD: CPT | Performed by: NURSE PRACTITIONER

## 2024-04-16 PROCEDURE — 3074F SYST BP LT 130 MM HG: CPT | Performed by: NURSE PRACTITIONER

## 2024-04-16 PROCEDURE — 1036F TOBACCO NON-USER: CPT | Performed by: NURSE PRACTITIONER

## 2024-04-16 RX ORDER — ESCITALOPRAM OXALATE 20 MG/1
20 TABLET ORAL DAILY
COMMUNITY
End: 2024-04-19 | Stop reason: SDUPTHER

## 2024-04-16 RX ORDER — ATORVASTATIN CALCIUM 40 MG/1
40 TABLET, FILM COATED ORAL DAILY
Qty: 90 TABLET | Refills: 0 | Status: SHIPPED | OUTPATIENT
Start: 2024-04-16

## 2024-04-16 RX ORDER — LISINOPRIL AND HYDROCHLOROTHIAZIDE 10; 12.5 MG/1; MG/1
1 TABLET ORAL DAILY
Qty: 90 TABLET | Refills: 0 | Status: SHIPPED | OUTPATIENT
Start: 2024-04-16

## 2024-04-16 RX ORDER — MELOXICAM 15 MG/1
15 TABLET ORAL DAILY
Qty: 90 TABLET | Refills: 0 | Status: SHIPPED | OUTPATIENT
Start: 2024-04-16

## 2024-04-16 RX ORDER — OMEPRAZOLE 20 MG/1
20 CAPSULE, DELAYED RELEASE ORAL 2 TIMES DAILY
Qty: 180 CAPSULE | Refills: 0 | Status: SHIPPED | OUTPATIENT
Start: 2024-04-16

## 2024-04-16 ASSESSMENT — ENCOUNTER SYMPTOMS
OCCASIONAL FEELINGS OF UNSTEADINESS: 0
DEPRESSION: 0
LOSS OF SENSATION IN FEET: 1

## 2024-04-16 ASSESSMENT — PAIN SCALES - GENERAL: PAINLEVEL: 8

## 2024-04-16 NOTE — PROGRESS NOTES
Loretta Walsh is here today in follow up for lumbar radiculopathy and to review images.     To review, she was initially evaluated on 05/09/2023. She reported pain in right low back, radiating into right lateral and anterior thigh since falling on 04/25/2023. On exam, she had no neuro deficits. Orders were placed for dynamic lumbar imaging and referral placed to PT.      At her visit on 06/20/2023, she had improvement with Home Exercise Program. She reported sensation of socks on her feet. She was to start gabapentin for her known peripheral neuropathy with instructions to obtain refills from her PCP. She was to follow up PRN     At her visit 04/02/2024, she reported stabbing pain in her left buttocks radiating into left thigh for several months, interfering with completion of ADLs. Worsened with sitting on left, while walking on right side. She reports recurrence of right leg heaviness. On exam, she had decreased sensory in bilateral anterior thighs. MRI L Spine was requested    Today, she continues with bilateral anterior thigh pain / paresthesia. She had imaging completed and is here to review findings.    MRI L SPINE on 04/03/2024:  IMPRESSION:  L4-S1 posterior spinal fusion and decompression with interbody  fixation. Elements of bridging osseous fusion suggested within the  vertebral bodies.      Severe bilateral neural foraminal narrowing at L2-L3 and L3-L4 with  moderate to severe left neural foraminal narrowing at L4-L5.      Multifocal lateral recess narrowing, particularly at L3-L4, with no  additional spinal canal stenosis evident.  Signed by: Nash Powers    TREATMENTS:  Home Exercise Program: continues daily lumbar stretching and core strengthening   NSAIDs  Topical  L4 - S1 PLIF, PSF 03/2020 (Dr. Cervantes) at [facility]    SMOKER: No  ANTICOAGULANT USE: YES: NSAID use    ROS x 10 is, otherwise, negative unless documented in HPI above    /64   Pulse 66   Temp 36 °C (96.8 °F)   Resp 18   Ht 1.549 m  "(5' 1\")   Wt 94.3 kg (208 lb)   BMI 39.30 kg/m²     On Exam: Appears comfortable initially. Arises to stand for more comfort  A&O x 4, speech clear / fluent  Respirations even / unlabored  Abdomen without distension  JEFFRIES while seated  Gait is steady    We reviewed MRI L Spine images with pointing out of multilevel neural foraminal narrowing at adjacent levels of spine. We discussed follow up with Dr. Cervantes to discuss surgical options. CT L Spine for better bony evaluation for surgical planning. She agrees to referral to Pain Medicine to consider REGINA as well. She reports a planned trip on a houseboat in May 2024. Discussed - will send in Rx for Medrol Dosepak for trip if needed (she agrees to let our office know).   Courtney Glover, APRN-CNP          "

## 2024-04-19 DIAGNOSIS — F41.9 ANXIETY: Primary | ICD-10-CM

## 2024-04-21 RX ORDER — ESCITALOPRAM OXALATE 20 MG/1
20 TABLET ORAL DAILY
Qty: 90 TABLET | Refills: 0 | Status: SHIPPED | OUTPATIENT
Start: 2024-04-21

## 2024-04-24 ENCOUNTER — OFFICE VISIT (OUTPATIENT)
Dept: ORTHOPEDIC SURGERY | Facility: CLINIC | Age: 74
End: 2024-04-24
Payer: MEDICARE

## 2024-04-24 VITALS — WEIGHT: 208 LBS | HEIGHT: 61 IN | BODY MASS INDEX: 39.27 KG/M2

## 2024-04-24 DIAGNOSIS — M18.0 PRIMARY OSTEOARTHRITIS OF BOTH FIRST CARPOMETACARPAL JOINTS: ICD-10-CM

## 2024-04-24 DIAGNOSIS — M79.644 BILATERAL THUMB PAIN: Primary | ICD-10-CM

## 2024-04-24 DIAGNOSIS — M79.645 BILATERAL THUMB PAIN: Primary | ICD-10-CM

## 2024-04-24 PROCEDURE — 1036F TOBACCO NON-USER: CPT | Performed by: ORTHOPAEDIC SURGERY

## 2024-04-24 PROCEDURE — 99213 OFFICE O/P EST LOW 20 MIN: CPT | Performed by: ORTHOPAEDIC SURGERY

## 2024-04-24 PROCEDURE — 3008F BODY MASS INDEX DOCD: CPT | Performed by: ORTHOPAEDIC SURGERY

## 2024-04-24 PROCEDURE — 1159F MED LIST DOCD IN RCRD: CPT | Performed by: ORTHOPAEDIC SURGERY

## 2024-04-24 PROCEDURE — 1160F RVW MEDS BY RX/DR IN RCRD: CPT | Performed by: ORTHOPAEDIC SURGERY

## 2024-04-24 PROCEDURE — 20600 DRAIN/INJ JOINT/BURSA W/O US: CPT | Performed by: ORTHOPAEDIC SURGERY

## 2024-04-24 RX ORDER — TRIAMCINOLONE ACETONIDE 40 MG/ML
5 INJECTION, SUSPENSION INTRA-ARTICULAR; INTRAMUSCULAR
Status: COMPLETED | OUTPATIENT
Start: 2024-04-24 | End: 2024-04-24

## 2024-04-24 RX ORDER — LIDOCAINE HYDROCHLORIDE 20 MG/ML
0.5 INJECTION, SOLUTION INFILTRATION; PERINEURAL
Status: COMPLETED | OUTPATIENT
Start: 2024-04-24 | End: 2024-04-24

## 2024-04-24 RX ADMIN — LIDOCAINE HYDROCHLORIDE 0.5 ML: 20 INJECTION, SOLUTION INFILTRATION; PERINEURAL at 09:34

## 2024-04-24 RX ADMIN — TRIAMCINOLONE ACETONIDE 5 MG: 40 INJECTION, SUSPENSION INTRA-ARTICULAR; INTRAMUSCULAR at 09:34

## 2024-04-24 NOTE — PROGRESS NOTES
Subjective    Patient ID: Ludwin Walsh is a 73 y.o. female.    Chief Complaint: Follow-up (F/U BILAT CMC/)     Last Surgery: No surgery found  Last Surgery Date: No surgery found    HPI  Patient comes in for follow-up of her bilateral first CMC arthritis.  She has chosen to treat this conservatively.  She states her last set of injections which were well over 4 months ago did provide adequate relief.  She currently denies numbness and paresthesias.    Objective   Ortho Exam  Patient is in no acute distress.  Exam of her right hand and wrist reveals her skin envelope is intact.  She is tender at the first CMC joint.  She has a positive first CMC grind.  She has clinical evidence of radial subluxation of the first metacarpal base.  She has a negative Finkelstein test.    Exam of the patient's left hand and wrist reveals her skin envelope is intact.  She is tender at the first CMC joint.  She has a positive first CMC grind.  She has a negative Finkelstein test.  There is evidence of radial subluxation of the first metacarpal base.    Assessment/Plan   Encounter Diagnoses:  Bilateral thumb pain    Primary osteoarthritis of both first carpometacarpal joints    Patient has known bilateral first CMC arthritis.  She elected to undergo another set of Kenalog injections today in the office.    S Inj/Asp: bilateral thumb CMC on 4/24/2024 9:34 AM  Indications: pain  Details: 25 G needle, dorsal approach  Medications (Right): 5 mg triamcinolone acetonide 40 mg/mL; 0.5 mL lidocaine 20 mg/mL (2 %)  Medications (Left): 5 mg triamcinolone acetonide 40 mg/mL; 0.5 mL lidocaine 20 mg/mL (2 %)  Procedure, treatment alternatives, risks and benefits explained, specific risks discussed. Consent was given by the patient. Immediately prior to procedure a time out was called to verify the correct patient, procedure, equipment, support staff and site/side marked as required. Patient was prepped and draped in the usual sterile fashion.        Patient was again informed to takes about a week for the injections to have an effect.  She will follow-up as her symptoms dictate.

## 2024-04-25 ENCOUNTER — HOSPITAL ENCOUNTER (OUTPATIENT)
Dept: RADIOLOGY | Facility: HOSPITAL | Age: 74
Discharge: HOME | End: 2024-04-25
Payer: MEDICARE

## 2024-04-25 DIAGNOSIS — M54.16 LUMBAR RADICULOPATHY: ICD-10-CM

## 2024-04-25 DIAGNOSIS — R20.2 BILATERAL LEG PARESTHESIA: ICD-10-CM

## 2024-04-25 PROCEDURE — 72131 CT LUMBAR SPINE W/O DYE: CPT | Performed by: STUDENT IN AN ORGANIZED HEALTH CARE EDUCATION/TRAINING PROGRAM

## 2024-04-25 PROCEDURE — 72131 CT LUMBAR SPINE W/O DYE: CPT

## 2024-04-30 ENCOUNTER — OFFICE VISIT (OUTPATIENT)
Dept: PAIN MEDICINE | Facility: CLINIC | Age: 74
End: 2024-04-30
Payer: MEDICARE

## 2024-04-30 ENCOUNTER — APPOINTMENT (OUTPATIENT)
Dept: NEUROSURGERY | Facility: CLINIC | Age: 74
End: 2024-04-30
Payer: MEDICARE

## 2024-04-30 VITALS
OXYGEN SATURATION: 98 % | DIASTOLIC BLOOD PRESSURE: 65 MMHG | WEIGHT: 208 LBS | BODY MASS INDEX: 39.27 KG/M2 | HEART RATE: 52 BPM | TEMPERATURE: 98.4 F | SYSTOLIC BLOOD PRESSURE: 121 MMHG | HEIGHT: 61 IN | RESPIRATION RATE: 16 BRPM

## 2024-04-30 DIAGNOSIS — M53.3 SI (SACROILIAC) JOINT DYSFUNCTION: Primary | ICD-10-CM

## 2024-04-30 DIAGNOSIS — Z98.1 S/P LUMBAR FUSION: ICD-10-CM

## 2024-04-30 PROCEDURE — 3074F SYST BP LT 130 MM HG: CPT | Performed by: ANESTHESIOLOGY

## 2024-04-30 PROCEDURE — 1036F TOBACCO NON-USER: CPT | Performed by: ANESTHESIOLOGY

## 2024-04-30 PROCEDURE — 1159F MED LIST DOCD IN RCRD: CPT | Performed by: ANESTHESIOLOGY

## 2024-04-30 PROCEDURE — 3078F DIAST BP <80 MM HG: CPT | Performed by: ANESTHESIOLOGY

## 2024-04-30 PROCEDURE — 99213 OFFICE O/P EST LOW 20 MIN: CPT | Performed by: ANESTHESIOLOGY

## 2024-04-30 PROCEDURE — 1125F AMNT PAIN NOTED PAIN PRSNT: CPT | Performed by: ANESTHESIOLOGY

## 2024-04-30 PROCEDURE — 3008F BODY MASS INDEX DOCD: CPT | Performed by: ANESTHESIOLOGY

## 2024-04-30 PROCEDURE — 1160F RVW MEDS BY RX/DR IN RCRD: CPT | Performed by: ANESTHESIOLOGY

## 2024-04-30 PROCEDURE — 99203 OFFICE O/P NEW LOW 30 MIN: CPT | Performed by: ANESTHESIOLOGY

## 2024-04-30 SDOH — ECONOMIC STABILITY: FOOD INSECURITY: WITHIN THE PAST 12 MONTHS, THE FOOD YOU BOUGHT JUST DIDN'T LAST AND YOU DIDN'T HAVE MONEY TO GET MORE.: NEVER TRUE

## 2024-04-30 SDOH — ECONOMIC STABILITY: FOOD INSECURITY: WITHIN THE PAST 12 MONTHS, YOU WORRIED THAT YOUR FOOD WOULD RUN OUT BEFORE YOU GOT MONEY TO BUY MORE.: NEVER TRUE

## 2024-04-30 ASSESSMENT — ENCOUNTER SYMPTOMS
WEAKNESS: 1
CONSTIPATION: 0
DEPRESSION: 1
OCCASIONAL FEELINGS OF UNSTEADINESS: 1
SHORTNESS OF BREATH: 0
FEVER: 0
BACK PAIN: 1
LOSS OF SENSATION IN FEET: 1
DYSURIA: 0
EYE PAIN: 0
NUMBNESS: 1
ADENOPATHY: 0
ABDOMINAL PAIN: 0

## 2024-04-30 ASSESSMENT — COLUMBIA-SUICIDE SEVERITY RATING SCALE - C-SSRS
2. HAVE YOU ACTUALLY HAD ANY THOUGHTS OF KILLING YOURSELF?: NO
1. IN THE PAST MONTH, HAVE YOU WISHED YOU WERE DEAD OR WISHED YOU COULD GO TO SLEEP AND NOT WAKE UP?: NO
6. HAVE YOU EVER DONE ANYTHING, STARTED TO DO ANYTHING, OR PREPARED TO DO ANYTHING TO END YOUR LIFE?: NO

## 2024-04-30 ASSESSMENT — LIFESTYLE VARIABLES
HOW OFTEN DO YOU HAVE SIX OR MORE DRINKS ON ONE OCCASION: NEVER
HOW MANY STANDARD DRINKS CONTAINING ALCOHOL DO YOU HAVE ON A TYPICAL DAY: 1 OR 2
HOW OFTEN DO YOU HAVE A DRINK CONTAINING ALCOHOL: MONTHLY OR LESS
AUDIT-C TOTAL SCORE: 1
SKIP TO QUESTIONS 9-10: 1
TOTAL SCORE: 4

## 2024-04-30 ASSESSMENT — PATIENT HEALTH QUESTIONNAIRE - PHQ9
SUM OF ALL RESPONSES TO PHQ9 QUESTIONS 1 AND 2: 0
1. LITTLE INTEREST OR PLEASURE IN DOING THINGS: NOT AT ALL
2. FEELING DOWN, DEPRESSED OR HOPELESS: NOT AT ALL

## 2024-04-30 ASSESSMENT — PAIN SCALES - GENERAL: PAINLEVEL: 4

## 2024-04-30 NOTE — H&P (VIEW-ONLY)
"Chief Complain    New Patient Visit and Back Pain (4/10 Low back bilateral hip, had back surgery in march of 2020, had X-ray, CAT scan, MRI in the past month all done at )    History Of Present Illness  Loretta Walsh \"Ramsey" is a 73 y.o. female here for evaluation of low back , radiating to bilateral buttocks. The patient has been experiencing these symptoms for last 4 week(s). The patient describes the pain as aching, stabbing. The patient's current pain score is 4 on a scale from 0-10. The pain is worsened by walking and is alleviated by sitting. Since the start of the symptoms the pain has been unchanged.    The patient denies any fever, chills, weight loss, bladder/ bowel incontinence, history of cancer, history of IV drug abuse, recent trauma.      Past Medical History  She has a past medical history of Anxiety disorder, unspecified, Asymptomatic menopausal state (02/05/2021), Benign paroxysmal vertigo, unspecified ear (06/21/2022), Chronic kidney disease, stage 3a (Multi) (12/20/2022), Major depressive disorder, single episode, in full remission ([facility]) (12/20/2022), Morbid (severe) obesity due to excess calories (Multi) (12/20/2022), Other specified disorders of bone density and structure, unspecified site (12/20/2022), Personal history of cervical dysplasia, Personal history of other diseases of the digestive system, Personal history of other diseases of the musculoskeletal system and connective tissue (02/14/2014), Personal history of other diseases of the musculoskeletal system and connective tissue (12/01/2020), Personal history of other diseases of the musculoskeletal system and connective tissue, Personal history of other diseases of the nervous system and sense organs, Personal history of other diseases of the nervous system and sense organs, Personal history of other diseases of the respiratory system (12/01/2020), Personal history of other endocrine, nutritional and metabolic disease, Personal " history of other medical treatment (2021), and Spinal stenosis, site unspecified (2022).    Surgical History  She has a past surgical history that includes Total knee arthroplasty (2014); Other surgical history (10/05/2020); Other surgical history (10/05/2020); Other surgical history (10/05/2020); Total knee arthroplasty (2018); Carpal tunnel release (2018);  section, classic (2018); Eye surgery (2018); and Lumbar fusion.    Social History  She reports that she has never smoked. She has never used smokeless tobacco. She reports current alcohol use. She reports that she does not use drugs.    Family History  Family History   Problem Relation Name Age of Onset    Diabetes Father          Allergies  Patient has no known allergies.    Review of Systems  Review of Systems   Constitutional:  Negative for fever.   HENT:  Negative for ear pain.    Eyes:  Negative for pain.   Respiratory:  Negative for shortness of breath.    Cardiovascular:  Negative for chest pain.   Gastrointestinal:  Negative for abdominal pain and constipation.   Endocrine: Negative for cold intolerance and heat intolerance.   Genitourinary:  Negative for dysuria.   Musculoskeletal:  Positive for back pain.   Skin:  Negative for rash.   Allergic/Immunologic: Negative for food allergies.   Neurological:  Positive for weakness and numbness.   Hematological:  Negative for adenopathy.   Psychiatric/Behavioral:  Negative for suicidal ideas.         Physical Exam  Physical Exam  HENT:      Head: Normocephalic.   Eyes:      Extraocular Movements: Extraocular movements intact.      Pupils: Pupils are equal, round, and reactive to light.   Cardiovascular:      Rate and Rhythm: Normal rate and regular rhythm.   Pulmonary:      Effort: Pulmonary effort is normal.   Abdominal:      Palpations: Abdomen is soft.   Musculoskeletal:      Cervical back: Neck supple.      Lumbar back: Bony tenderness present. Decreased  "range of motion.        Back:    Skin:     General: Skin is warm.   Neurological:      Mental Status: She is alert and oriented to person, place, and time.      Motor: Motor function is intact.      Deep Tendon Reflexes:      Reflex Scores:       Patellar reflexes are 0 on the right side and 0 on the left side.       Achilles reflexes are 0 on the right side and 0 on the left side.  Psychiatric:         Mood and Affect: Mood normal.           Last Recorded Vitals  Blood pressure 121/65, pulse 52, temperature 36.9 °C (98.4 °F), temperature source Temporal, resp. rate 16, height 1.549 m (5' 1\"), weight 94.3 kg (208 lb), SpO2 98%.    Reviewed Images  Reviewed and independently interpreted MRI Lumbar spine L4-S1 laminectomy and fusion, bilateral severe neural foraminal stenosis L2-3, 3 4    Reviewed Labs   Latest Reference Range & Units 12/19/23 11:59   LEUKOCYTES (10*3/UL) IN BLOOD BY AUTOMATED COUNT, [place] 4.4 - 11.3 x10*3/uL 7.1   nRBC 0.0 - 0.0 /100 WBCs 0.0   ERYTHROCYTES (10*6/UL) IN BLOOD BY AUTOMATED COUNT, [place] 4.00 - 5.20 x10*6/uL 4.11   HEMOGLOBIN 12.0 - 16.0 g/dL 12.6   HEMATOCRIT 36.0 - 46.0 % 39.2   MCV 80 - 100 fL 95   MCH 26.0 - 34.0 pg 30.7   MCHC 32.0 - 36.0 g/dL 32.1   RED CELL DISTRIBUTION WIDTH 11.5 - 14.5 % 12.9   PLATELETS (10*3/UL) IN BLOOD AUTOMATED COUNT, [place] 150 - 450 x10*3/uL 302      Latest Reference Range & Units 12/19/23 11:59   Anion Gap 10 - 20 mmol/L 13   Blood Urea Nitrogen 6 - 23 mg/dL 24 (H)   Creatinine 0.50 - 1.05 mg/dL 1.16 (H)   EGFR >60 mL/min/1.73m*2 50 (L)   Calcium 8.6 - 10.6 mg/dL 10.0   Albumin 3.4 - 5.0 g/dL 4.5   Alkaline Phosphatase 33 - 136 U/L 96   ALT 7 - 45 U/L 19   AST 9 - 39 U/L 20   Bilirubin Total 0.0 - 1.2 mg/dL 0.9   HDL CHOLESTEROL mg/dL 52.3   Cholesterol/HDL Ratio  3.4   LDL Calculated <=99 mg/dL 100 (H)   VLDL 0 - 40 mg/dL 26   TRIGLYCERIDES 0 - 149 mg/dL 130   Non HDL Cholesterol 0 - 149 mg/dL 126   (H): Data is abnormally high  (L): Data is " "abnormally low    Assessment/Plan   Encounter Diagnosis   Name Primary?    SI (sacroiliac) joint dysfunction Yes        Loretta Walsh \"Ramsey" is a 73 y.o. female here for evaluation of chronic low back pain radiating to bilateral buttocks.  History of chronic low back pain in the past he is s/p L4-S1 fusion which resolved her symptoms for several years.  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  On physical examination she does have improved continuous of bilateral SI joint dysfunction including Jose and Diana finger test.  I would recommend trial of bilateral SI joint injection for diagnostic and therapeutic purposes.  Would also recommend physical therapy for strengthening and balance training when she returns from her vacation.  Continue follow-up with Dr. Cervantes as previously scheduled             Lan Grimes MD  "

## 2024-04-30 NOTE — PROGRESS NOTES
"Chief Complain    New Patient Visit and Back Pain (4/10 Low back bilateral hip, had back surgery in march of 2020, had X-ray, CAT scan, MRI in the past month all done at )    History Of Present Illness  Loretta Walsh \"Ramsey" is a 73 y.o. female here for evaluation of low back , radiating to bilateral buttocks. The patient has been experiencing these symptoms for last 4 week(s). The patient describes the pain as aching, stabbing. The patient's current pain score is 4 on a scale from 0-10. The pain is worsened by walking and is alleviated by sitting. Since the start of the symptoms the pain has been unchanged.    The patient denies any fever, chills, weight loss, bladder/ bowel incontinence, history of cancer, history of IV drug abuse, recent trauma.      Past Medical History  She has a past medical history of Anxiety disorder, unspecified, Asymptomatic menopausal state (02/05/2021), Benign paroxysmal vertigo, unspecified ear (06/21/2022), Chronic kidney disease, stage 3a (Multi) (12/20/2022), Major depressive disorder, single episode, in full remission (CMS-[facility]) (12/20/2022), Morbid (severe) obesity due to excess calories (Multi) (12/20/2022), Other specified disorders of bone density and structure, unspecified site (12/20/2022), Personal history of cervical dysplasia, Personal history of other diseases of the digestive system, Personal history of other diseases of the musculoskeletal system and connective tissue (02/14/2014), Personal history of other diseases of the musculoskeletal system and connective tissue (12/01/2020), Personal history of other diseases of the musculoskeletal system and connective tissue, Personal history of other diseases of the nervous system and sense organs, Personal history of other diseases of the nervous system and sense organs, Personal history of other diseases of the respiratory system (12/01/2020), Personal history of other endocrine, nutritional and metabolic disease, Personal " history of other medical treatment (2021), and Spinal stenosis, site unspecified (2022).    Surgical History  She has a past surgical history that includes Total knee arthroplasty (2014); Other surgical history (10/05/2020); Other surgical history (10/05/2020); Other surgical history (10/05/2020); Total knee arthroplasty (2018); Carpal tunnel release (2018);  section, classic (2018); Eye surgery (2018); and Lumbar fusion.    Social History  She reports that she has never smoked. She has never used smokeless tobacco. She reports current alcohol use. She reports that she does not use drugs.    Family History  Family History   Problem Relation Name Age of Onset    Diabetes Father          Allergies  Patient has no known allergies.    Review of Systems  Review of Systems   Constitutional:  Negative for fever.   HENT:  Negative for ear pain.    Eyes:  Negative for pain.   Respiratory:  Negative for shortness of breath.    Cardiovascular:  Negative for chest pain.   Gastrointestinal:  Negative for abdominal pain and constipation.   Endocrine: Negative for cold intolerance and heat intolerance.   Genitourinary:  Negative for dysuria.   Musculoskeletal:  Positive for back pain.   Skin:  Negative for rash.   Allergic/Immunologic: Negative for food allergies.   Neurological:  Positive for weakness and numbness.   Hematological:  Negative for adenopathy.   Psychiatric/Behavioral:  Negative for suicidal ideas.         Physical Exam  Physical Exam  HENT:      Head: Normocephalic.   Eyes:      Extraocular Movements: Extraocular movements intact.      Pupils: Pupils are equal, round, and reactive to light.   Cardiovascular:      Rate and Rhythm: Normal rate and regular rhythm.   Pulmonary:      Effort: Pulmonary effort is normal.   Abdominal:      Palpations: Abdomen is soft.   Musculoskeletal:      Cervical back: Neck supple.      Lumbar back: Bony tenderness present. Decreased  "range of motion.        Back:    Skin:     General: Skin is warm.   Neurological:      Mental Status: She is alert and oriented to person, place, and time.      Motor: Motor function is intact.      Deep Tendon Reflexes:      Reflex Scores:       Patellar reflexes are 0 on the right side and 0 on the left side.       Achilles reflexes are 0 on the right side and 0 on the left side.  Psychiatric:         Mood and Affect: Mood normal.           Last Recorded Vitals  Blood pressure 121/65, pulse 52, temperature 36.9 °C (98.4 °F), temperature source Temporal, resp. rate 16, height 1.549 m (5' 1\"), weight 94.3 kg (208 lb), SpO2 98%.    Reviewed Images  Reviewed and independently interpreted MRI Lumbar spine L4-S1 laminectomy and fusion, bilateral severe neural foraminal stenosis L2-3, 3 4    Reviewed Labs   Latest Reference Range & Units 12/19/23 11:59   LEUKOCYTES (10*3/UL) IN BLOOD BY AUTOMATED COUNT, [place] 4.4 - 11.3 x10*3/uL 7.1   nRBC 0.0 - 0.0 /100 WBCs 0.0   ERYTHROCYTES (10*6/UL) IN BLOOD BY AUTOMATED COUNT, [place] 4.00 - 5.20 x10*6/uL 4.11   HEMOGLOBIN 12.0 - 16.0 g/dL 12.6   HEMATOCRIT 36.0 - 46.0 % 39.2   MCV 80 - 100 fL 95   MCH 26.0 - 34.0 pg 30.7   MCHC 32.0 - 36.0 g/dL 32.1   RED CELL DISTRIBUTION WIDTH 11.5 - 14.5 % 12.9   PLATELETS (10*3/UL) IN BLOOD AUTOMATED COUNT, [place] 150 - 450 x10*3/uL 302      Latest Reference Range & Units 12/19/23 11:59   Anion Gap 10 - 20 mmol/L 13   Blood Urea Nitrogen 6 - 23 mg/dL 24 (H)   Creatinine 0.50 - 1.05 mg/dL 1.16 (H)   EGFR >60 mL/min/1.73m*2 50 (L)   Calcium 8.6 - 10.6 mg/dL 10.0   Albumin 3.4 - 5.0 g/dL 4.5   Alkaline Phosphatase 33 - 136 U/L 96   ALT 7 - 45 U/L 19   AST 9 - 39 U/L 20   Bilirubin Total 0.0 - 1.2 mg/dL 0.9   HDL CHOLESTEROL mg/dL 52.3   Cholesterol/HDL Ratio  3.4   LDL Calculated <=99 mg/dL 100 (H)   VLDL 0 - 40 mg/dL 26   TRIGLYCERIDES 0 - 149 mg/dL 130   Non HDL Cholesterol 0 - 149 mg/dL 126   (H): Data is abnormally high  (L): Data is " "abnormally low    Assessment/Plan   Encounter Diagnosis   Name Primary?    SI (sacroiliac) joint dysfunction Yes        Loretta Walsh \"Ramsey" is a 73 y.o. female here for evaluation of chronic low back pain radiating to bilateral buttocks.  History of chronic low back pain in the past he is s/p L4-S1 fusion which resolved her symptoms for several years.  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  On physical examination she does have improved continuous of bilateral SI joint dysfunction including Jose and Diana finger test.  I would recommend trial of bilateral SI joint injection for diagnostic and therapeutic purposes.  Would also recommend physical therapy for strengthening and balance training when she returns from her vacation.  Continue follow-up with Dr. Cervnates as previously scheduled             Lan Grimes MD  "

## 2024-05-01 ENCOUNTER — APPOINTMENT (OUTPATIENT)
Dept: RADIOLOGY | Facility: HOSPITAL | Age: 74
End: 2024-05-01
Payer: MEDICARE

## 2024-05-06 ENCOUNTER — APPOINTMENT (OUTPATIENT)
Dept: PAIN MEDICINE | Facility: CLINIC | Age: 74
End: 2024-05-06
Payer: MEDICARE

## 2024-05-10 ENCOUNTER — HOSPITAL ENCOUNTER (OUTPATIENT)
Dept: RADIOLOGY | Facility: CLINIC | Age: 74
Discharge: HOME | End: 2024-05-10
Payer: MEDICARE

## 2024-05-10 ENCOUNTER — HOSPITAL ENCOUNTER (OUTPATIENT)
Dept: PAIN MEDICINE | Facility: CLINIC | Age: 74
Discharge: HOME | End: 2024-05-10
Payer: MEDICARE

## 2024-05-10 VITALS
HEART RATE: 53 BPM | SYSTOLIC BLOOD PRESSURE: 116 MMHG | OXYGEN SATURATION: 97 % | BODY MASS INDEX: 38.28 KG/M2 | TEMPERATURE: 97.3 F | WEIGHT: 208 LBS | DIASTOLIC BLOOD PRESSURE: 62 MMHG | HEIGHT: 62 IN | RESPIRATION RATE: 16 BRPM

## 2024-05-10 DIAGNOSIS — M53.3 SI (SACROILIAC) JOINT DYSFUNCTION: ICD-10-CM

## 2024-05-10 PROCEDURE — 2500000004 HC RX 250 GENERAL PHARMACY W/ HCPCS (ALT 636 FOR OP/ED)

## 2024-05-10 PROCEDURE — 7100000010 HC PHASE TWO TIME - EACH INCREMENTAL 1 MINUTE

## 2024-05-10 PROCEDURE — 7100000009 HC PHASE TWO TIME - INITIAL BASE CHARGE

## 2024-05-10 PROCEDURE — 27096 INJECT SACROILIAC JOINT: CPT | Mod: 50 | Performed by: ANESTHESIOLOGY

## 2024-05-10 PROCEDURE — 2500000005 HC RX 250 GENERAL PHARMACY W/O HCPCS

## 2024-05-10 PROCEDURE — 27096 INJECT SACROILIAC JOINT: CPT | Performed by: ANESTHESIOLOGY

## 2024-05-10 RX ORDER — LIDOCAINE HYDROCHLORIDE 10 MG/ML
INJECTION, SOLUTION EPIDURAL; INFILTRATION; INTRACAUDAL; PERINEURAL
Status: COMPLETED
Start: 2024-05-10 | End: 2024-05-10

## 2024-05-10 RX ORDER — ROPIVACAINE HYDROCHLORIDE 5 MG/ML
INJECTION, SOLUTION EPIDURAL; INFILTRATION; PERINEURAL
Status: COMPLETED
Start: 2024-05-10 | End: 2024-05-10

## 2024-05-10 RX ORDER — TRIAMCINOLONE ACETONIDE 40 MG/ML
INJECTION, SUSPENSION INTRA-ARTICULAR; INTRAMUSCULAR
Status: COMPLETED
Start: 2024-05-10 | End: 2024-05-10

## 2024-05-10 RX ADMIN — ROPIVACAINE HYDROCHLORIDE 100 MG: 5 INJECTION, SOLUTION EPIDURAL; INFILTRATION; PERINEURAL at 10:50

## 2024-05-10 RX ADMIN — LIDOCAINE HYDROCHLORIDE 300 MG: 10 INJECTION, SOLUTION EPIDURAL; INFILTRATION; INTRACAUDAL; PERINEURAL at 10:50

## 2024-05-10 RX ADMIN — TRIAMCINOLONE ACETONIDE 80 MG: 40 INJECTION, SUSPENSION INTRA-ARTICULAR; INTRAMUSCULAR at 10:50

## 2024-05-10 ASSESSMENT — ENCOUNTER SYMPTOMS
OCCASIONAL FEELINGS OF UNSTEADINESS: 1
LOSS OF SENSATION IN FEET: 1
DEPRESSION: 0

## 2024-05-10 ASSESSMENT — PAIN - FUNCTIONAL ASSESSMENT: PAIN_FUNCTIONAL_ASSESSMENT: 0-10

## 2024-05-10 ASSESSMENT — PAIN SCALES - GENERAL
PAINLEVEL_OUTOF10: 5 - MODERATE PAIN
PAINLEVEL_OUTOF10: 7

## 2024-05-10 NOTE — Clinical Note
Prepped with ChloraPrep, a minimum of 3 minute dry time, longer if needed, no pooling noted, patient draped in sterile fashion. Lumbar area

## 2024-05-10 NOTE — OP NOTE
"*Procedure Note     Date: 5/10/2024  OR Location: PAR NON-OR PROCEDURES    Name: Loretta Walsh \"Ludwin\", : 1950, Age: 73 y.o., MRN: [id number], Sex: female    Diagnosis  Preprocedure diagnosis:bilateral SI joint dysfunction  Postprocedure diagnosis: Same    Procedures  bilateral sacroiliac joint injection            The patient was seen in the preoperative area. The risks, benefits, complications, treatment options, non-operative alternatives, expected recovery and outcomes were discussed with the patient. The patient concurred with the proposed plan, giving informed consent.      Procedure Details:   The patient was placed in the prone position, and the skin overlying the bilateral sacroiliac joint space was properly prepared with antiseptic solution.  With strict aseptic technique, the posterior superior spine of the ilium was identified. The skin at the  needle entry site was anesthetized with 5 mL 1% lidocaine with a 25-gauge needle. At this point, a 3.5 inch 35-gauge spinal needle was carefully advanced through the skin and subcutaneous tissues at a 45-degree angle toward the right  sacroiliac joint.  After the joint space was  entered, after negative aspiration a solution containing 40 mg of Kenalog and 3 mL of 0.5% ropivacaine was injected in right sacroiliac joint/joints, without any resistance or paresthesia.  The needle was then removed.    The skin at the  needle entry site was anesthetized with 5 mL 1% lidocaine with a 25-gauge needle. At this point, a 3.5 inch 35-gauge spinal needle was carefully advanced through the skin and subcutaneous tissues at a 45-degree angle toward the left  sacroiliac joint.  After the joint space was  entered, after negative aspiration a solution containing 40 mg of Kenalog and 3 mL of 0.5% ropivacaine was injected in left sacroiliac joint/joints, without any resistance or paresthesia.  The needle was then removed.       and a sterile dressing was applied.    The patient " was then moved to recovery in stable condition.    Complications:  None; patient tolerated the procedure well.    Disposition: Home  Condition: stable          Additional Details: NA    Attending Attestation: I performed the procedure.    Lan Grimes MD

## 2024-06-05 ENCOUNTER — OFFICE VISIT (OUTPATIENT)
Dept: NEUROSURGERY | Facility: CLINIC | Age: 74
End: 2024-06-05
Payer: MEDICARE

## 2024-06-05 VITALS
HEIGHT: 62 IN | BODY MASS INDEX: 38.28 KG/M2 | DIASTOLIC BLOOD PRESSURE: 58 MMHG | SYSTOLIC BLOOD PRESSURE: 136 MMHG | HEART RATE: 77 BPM | WEIGHT: 208 LBS | TEMPERATURE: 98.3 F

## 2024-06-05 DIAGNOSIS — M51.36 ADJACENT SEGMENT DISEASE OF LUMBAR SPINE WITH HISTORY OF FUSION PROCEDURE: ICD-10-CM

## 2024-06-05 DIAGNOSIS — M48.061 FORAMINAL STENOSIS OF LUMBAR REGION: Primary | ICD-10-CM

## 2024-06-05 DIAGNOSIS — Z98.1 ADJACENT SEGMENT DISEASE OF LUMBAR SPINE WITH HISTORY OF FUSION PROCEDURE: ICD-10-CM

## 2024-06-05 PROBLEM — M51.369 ADJACENT SEGMENT DISEASE OF LUMBAR SPINE WITH HISTORY OF FUSION PROCEDURE: Status: ACTIVE | Noted: 2024-06-05

## 2024-06-05 PROCEDURE — 1036F TOBACCO NON-USER: CPT | Performed by: NEUROLOGICAL SURGERY

## 2024-06-05 PROCEDURE — 3078F DIAST BP <80 MM HG: CPT | Performed by: NEUROLOGICAL SURGERY

## 2024-06-05 PROCEDURE — 1159F MED LIST DOCD IN RCRD: CPT | Performed by: NEUROLOGICAL SURGERY

## 2024-06-05 PROCEDURE — 3075F SYST BP GE 130 - 139MM HG: CPT | Performed by: NEUROLOGICAL SURGERY

## 2024-06-05 PROCEDURE — 1125F AMNT PAIN NOTED PAIN PRSNT: CPT | Performed by: NEUROLOGICAL SURGERY

## 2024-06-05 PROCEDURE — 99215 OFFICE O/P EST HI 40 MIN: CPT | Performed by: NEUROLOGICAL SURGERY

## 2024-06-05 PROCEDURE — 3008F BODY MASS INDEX DOCD: CPT | Performed by: NEUROLOGICAL SURGERY

## 2024-06-05 ASSESSMENT — PAIN SCALES - GENERAL: PAINLEVEL: 8

## 2024-06-05 ASSESSMENT — ENCOUNTER SYMPTOMS: OCCASIONAL FEELINGS OF UNSTEADINESS: 0

## 2024-06-05 NOTE — PROGRESS NOTES
[facility] [place]  Department of Neurological Surgery  Established Patient Visit    History of Present Illness  Loretta Walsh is a 73 y.o. year old female who presents to the spine clinic in follow up with her  complaining of 3 months worth of back pain with radiation into her legs with weakness worse on the right side than the left.  Her leg buckled yesterday and she did fall.  She has had 1 epidural steroid injection without relief.  I think if Dr. Grimes tries a different approach it might be worthwhile getting another injection.  Patient has recently had an MRI and a CAT scan and plain x-rays of her lumbar spine.  It appears that she has segmental disease with severe foraminal stenosis bilaterally at L3-4 and L2-3.  On the CAT scan she also has degenerative changes at L1-2.  We can see that she has a solid fusion from L4 to the sacrum.  They are planning to be traveling at the end of the summer but I told him that I thought that trying some additional physical therapy for core strengthening and going ahead with another epidural injection even if it does not work has the potential to give her some relief in the strengthening will help regardless of how this problem is treated in the long run        Patient's BMI is Body mass index is 38.04 kg/m².    14/14 systems reviewed and negative other than what is listed in the history of present illness    Patient Active Problem List   Diagnosis    Arthritis of carpometacarpal (CMC) joint of left thumb    ASCVD (arteriosclerotic cardiovascular disease)    Benign essential hypertension    BPV (benign positional vertigo)    CKD stage G3a/A1, GFR 45-59 and albumin creatinine ratio <30 mg/g (Multi)    Colon polyps    Depression, major, in remission (CMS-[facility])    GERD (gastroesophageal reflux disease)    Hyperlipidemia    Hypertension    Insomnia, idiopathic    Multilevel foraminal stenosis    Osteoarthritis of spine with radiculopathy, lumbar region     Osteopenia    Peripheral neuropathy    Plantar fasciitis of right foot    Medicare annual wellness visit, subsequent    Foraminal stenosis of lumbar region    Adjacent segment disease of lumbar spine with history of fusion procedure     Past Medical History:   Diagnosis Date    Anxiety disorder, unspecified     Anxiety    Asymptomatic menopausal state 02/05/2021    Postmenopausal    Benign paroxysmal vertigo, unspecified ear 06/21/2022    BPV (benign positional vertigo)    Chronic kidney disease, stage 3a (Multi) 12/20/2022    CKD stage G3a/A1, GFR 45-59 and albumin creatinine ratio <30 mg/g    Major depressive disorder, single episode, in full remission (CMS-[facility]) 12/20/2022    Depression, major, in remission    Morbid (severe) obesity due to excess calories (Multi) 12/20/2022    Class 2 severe obesity with serious comorbidity and body mass index (BMI) of 38.0 to 38.9 in adult    Other specified disorders of bone density and structure, unspecified site 12/20/2022    Osteopenia    Personal history of cervical dysplasia     History of cervical dysplasia    Personal history of other diseases of the digestive system     History of gastroesophageal reflux (GERD)    Personal history of other diseases of the musculoskeletal system and connective tissue 02/14/2014    History of osteoarthritis    Personal history of other diseases of the musculoskeletal system and connective tissue 12/01/2020    History of arthritis    Personal history of other diseases of the musculoskeletal system and connective tissue     History of arthritis    Personal history of other diseases of the nervous system and sense organs     History of neuropathy    Personal history of other diseases of the nervous system and sense organs     History of glaucoma    Personal history of other diseases of the respiratory system 12/01/2020    History of acute sinusitis    Personal history of other endocrine, nutritional and metabolic disease     History of  hyperlipidemia    Personal history of other medical treatment 2021    History of screening mammography    Spinal stenosis, site unspecified 2022    Multilevel foraminal stenosis     Past Surgical History:   Procedure Laterality Date    CARPAL TUNNEL RELEASE  2018    Neuroplasty Decompression Median Nerve At Carpal Tunnel     SECTION, CLASSIC  2018     Section    EYE SURGERY  2018    Eye Surgery    LUMBAR FUSION      OTHER SURGICAL HISTORY  10/05/2020    Loop electrosurgical excision procedure    OTHER SURGICAL HISTORY  10/05/2020    Hysteroscopic uterine polypectomy    OTHER SURGICAL HISTORY  10/05/2020    Dilation and curettage    TOTAL KNEE ARTHROPLASTY  2014    Knee Replacement    TOTAL KNEE ARTHROPLASTY  2018    Knee Replacement     Social History     Tobacco Use    Smoking status: Never    Smokeless tobacco: Never   Substance Use Topics    Alcohol use: Yes     Comment: Rarely     family history includes Diabetes in her father.    Current Outpatient Medications:     ascorbic acid (Vitamin C) 500 mg tablet, Take 1 tablet (500 mg) by mouth once daily., Disp: , Rfl:     atorvastatin (Lipitor) 40 mg tablet, TAKE 1 TABLET BY MOUTH EVERY DAY, Disp: 90 tablet, Rfl: 0    escitalopram (Lexapro) 20 mg tablet, Take 1 tablet (20 mg) by mouth once daily., Disp: 90 tablet, Rfl: 0    gabapentin (Neurontin) 300 mg capsule, Take 1 capsule (300 mg) by mouth once daily at bedtime., Disp: 90 capsule, Rfl: 1    lisinopriL-hydrochlorothiazide 10-12.5 mg tablet, TAKE 1 TABLET BY MOUTH EVERY DAY, Disp: 90 tablet, Rfl: 0    meloxicam (Mobic) 15 mg tablet, TAKE 1 TABLET BY MOUTH EVERY DAY, Disp: 90 tablet, Rfl: 0    omeprazole (PriLOSEC) 20 mg DR capsule, TAKE 1 CAPSULE BY MOUTH 2 TIMES A DAY., Disp: 180 capsule, Rfl: 0    cholecalciferol (Vitamin D-3) 50 mcg (2,000 unit) capsule, Take by mouth., Disp: , Rfl:     multivitamin tablet, Take 1 tablet by mouth once daily., Disp: ,  Rfl:   No Known Allergies    Physical Examination:    General: NAD, AOx 3,  no aphasia or dysarthria, normal fund of knowledge  Cranial Nerves II-XII: VFF, PERRL, EOMI, Face Symm, Facial SILT, Palate/Tongue midline and symmetric  Motor: 5/5 Throughout all extremities,  No drift, no dysmetria on finger to nose  Sensation: SILT and PP throughout all extremities  DTRS: 2+ Throughout, No Hoffmans or Clonus  Her gait is bit antalgic favoring the right leg but she was able to toe and heel walk and she has a negative Romberg    Results:  I personally reviewed and interpreted the imaging results which included the 3 studies that I described above which included the plain x-rays, CAT scan, and MRI of her lumbar spine    Assessment and Plan:      Loretta Walsh is a 73 y.o. year old female who presents to the spine clinic in follow up with segmental disease of the lumbar spine after her fusion with significant foraminal stenosis at the 2 proximal levels.  I think it is reasonable for her to consider a direct lateral approach at these levels to get good foraminal opening with indirect decompression and then stabilizing the spine at least to L2.  The surgeon who ends up doing the operation is going to have to decide if he is doing 1 level to 3 levels and where he or she is going to stop the extension.      I have reviewed all prior documentation and reviewed the electronic medical record since admission. I have personally have reviewed all advanced imaging not just the reports and used my interpretation as documented as the relevant findings. I have reviewed the risks and benefits of all treatment recommendations listed in this note with the patient and family. I spent a total of 45 minutes in service to this patient's care during this date of service.      The above clinical summary has been dictated with voice recognition software. It has not been proofread for grammatical errors, typographical mistakes, or other semantic  inconsistencies.    Thank you for visiting our office today. It was our pleasure to take part in your healthcare.     Do not hesitate to call with any questions regarding your plan of care after leaving. My office can be reached at [phone] M-F 8am-4pm.     To clinicians, thank you very much for this kind referral. It is a privilege to partner with you in the care of your patients. My office would be delighted to assist you with any further consultations or with questions regarding the plan of care outlined. Do not hesitate to call the office or contact me directly.     Sincerely,    Jer Cervantes MD, FAANS, FACS  Board Certified Neurological Surgeon  , Department of Neurological Surgery  [facility] [employer] [facility]    [facility]  [address]  Medical [facility] [address]  [address]  [address]  [address]  [place] [address]    Phone: [phone]  Fax: [phone]

## 2024-06-19 ENCOUNTER — APPOINTMENT (OUTPATIENT)
Dept: PRIMARY CARE | Facility: CLINIC | Age: 74
End: 2024-06-19
Payer: MEDICARE

## 2024-06-19 VITALS
WEIGHT: 204 LBS | SYSTOLIC BLOOD PRESSURE: 126 MMHG | HEIGHT: 62 IN | BODY MASS INDEX: 37.54 KG/M2 | DIASTOLIC BLOOD PRESSURE: 78 MMHG

## 2024-06-19 DIAGNOSIS — R21 RASH: ICD-10-CM

## 2024-06-19 DIAGNOSIS — E78.5 DYSLIPIDEMIA: ICD-10-CM

## 2024-06-19 DIAGNOSIS — Z00.00 HEALTHCARE MAINTENANCE: ICD-10-CM

## 2024-06-19 DIAGNOSIS — F32.5 DEPRESSION, MAJOR, IN REMISSION (CMS-HCC): ICD-10-CM

## 2024-06-19 DIAGNOSIS — N18.31 CKD STAGE G3A/A1, GFR 45-59 AND ALBUMIN CREATININE RATIO <30 MG/G (MULTI): ICD-10-CM

## 2024-06-19 DIAGNOSIS — F41.9 ANXIETY: ICD-10-CM

## 2024-06-19 DIAGNOSIS — I10 ESSENTIAL (PRIMARY) HYPERTENSION: ICD-10-CM

## 2024-06-19 DIAGNOSIS — M19.90 ARTHRITIS: ICD-10-CM

## 2024-06-19 PROCEDURE — 3078F DIAST BP <80 MM HG: CPT | Performed by: STUDENT IN AN ORGANIZED HEALTH CARE EDUCATION/TRAINING PROGRAM

## 2024-06-19 PROCEDURE — G2211 COMPLEX E/M VISIT ADD ON: HCPCS | Performed by: STUDENT IN AN ORGANIZED HEALTH CARE EDUCATION/TRAINING PROGRAM

## 2024-06-19 PROCEDURE — 3008F BODY MASS INDEX DOCD: CPT | Performed by: STUDENT IN AN ORGANIZED HEALTH CARE EDUCATION/TRAINING PROGRAM

## 2024-06-19 PROCEDURE — 3074F SYST BP LT 130 MM HG: CPT | Performed by: STUDENT IN AN ORGANIZED HEALTH CARE EDUCATION/TRAINING PROGRAM

## 2024-06-19 PROCEDURE — 99213 OFFICE O/P EST LOW 20 MIN: CPT | Performed by: STUDENT IN AN ORGANIZED HEALTH CARE EDUCATION/TRAINING PROGRAM

## 2024-06-19 RX ORDER — OMEPRAZOLE 20 MG/1
20 CAPSULE, DELAYED RELEASE ORAL 2 TIMES DAILY
Qty: 180 CAPSULE | Refills: 1 | Status: SHIPPED | OUTPATIENT
Start: 2024-06-19

## 2024-06-19 RX ORDER — MELOXICAM 15 MG/1
15 TABLET ORAL DAILY
Qty: 90 TABLET | Refills: 1 | Status: SHIPPED | OUTPATIENT
Start: 2024-06-19

## 2024-06-19 RX ORDER — NYSTATIN 100000 [USP'U]/G
1 POWDER TOPICAL 2 TIMES DAILY
Qty: 60 G | Refills: 2 | Status: SHIPPED | OUTPATIENT
Start: 2024-06-19 | End: 2025-06-19

## 2024-06-19 RX ORDER — ESCITALOPRAM OXALATE 20 MG/1
20 TABLET ORAL DAILY
Qty: 90 TABLET | Refills: 1 | Status: SHIPPED | OUTPATIENT
Start: 2024-06-19

## 2024-06-19 RX ORDER — ATORVASTATIN CALCIUM 40 MG/1
40 TABLET, FILM COATED ORAL DAILY
Qty: 90 TABLET | Refills: 1 | Status: SHIPPED | OUTPATIENT
Start: 2024-06-19

## 2024-06-19 RX ORDER — LISINOPRIL AND HYDROCHLOROTHIAZIDE 10; 12.5 MG/1; MG/1
1 TABLET ORAL DAILY
Qty: 90 TABLET | Refills: 1 | Status: SHIPPED | OUTPATIENT
Start: 2024-06-19

## 2024-06-19 NOTE — PROGRESS NOTES
"Subjective   Patient ID: Ludwin Walsh is a 73 y.o. female who presents for Follow-up (Med refill).    HPI     Follow-up  Medication refill  Rash underneath breasts and abdomen has been more noticeable since has been more humid.  Has tried over-the-counter creams without resolution  Going to be following up with new spine surgeon to discuss potential surgical options    Review of Systems    8 point review of systems is otherwise negative unless mentioned on HPI      Objective   /78   Ht 1.575 m (5' 2\")   Wt 92.5 kg (204 lb)   BMI 37.31 kg/m²     Physical Exam    General: No acute distress  HEENT: EOMI  CV: Regular rate and rhythm, normal S1 and S2, no murmurs  Pulm: Clear to auscultation bilaterally, no wheezings, rales or rhonchi  Abd: Nondistended  Lymphatic: No lymphadenopathy      Assessment/Plan       Back pain  -Following with spine surgery and pain management    Hypertension  -Continue lisinopril/hydrochlorothiazide 10/12.5 mg daily     Dyslipidemia  -Continue atorvastatin 40 mg daily  -Coronary artery calcium score of 190 in 8/2023     Depression  -After previous visit had decreased Lexapro to 10 mg daily, had return of symptoms and has since resumed 20 mg daily     Hand pain/thumb pain  -Has been on meloxicam as needed  -Has seen ortho-hand and received steroid injections     Healthcare maintenance  -Colonoscopy 2020, repeat interval of 10 years, had not been recommended for any further colonoscopies  -Mammogram 2/2024  -Nystatin powder for rash underneath breasts and abdomen     RTC 6 months     This note was dictated by speech recognition. Minor errors in transcription may be present.       "

## 2024-06-26 ENCOUNTER — APPOINTMENT (OUTPATIENT)
Dept: PAIN MEDICINE | Facility: CLINIC | Age: 74
End: 2024-06-26
Payer: MEDICARE

## 2024-06-26 VITALS
RESPIRATION RATE: 50 BRPM | HEIGHT: 62 IN | OXYGEN SATURATION: 98 % | WEIGHT: 204 LBS | DIASTOLIC BLOOD PRESSURE: 62 MMHG | BODY MASS INDEX: 37.54 KG/M2 | SYSTOLIC BLOOD PRESSURE: 149 MMHG | TEMPERATURE: 96.8 F | HEART RATE: 50 BPM

## 2024-06-26 DIAGNOSIS — Z98.1 S/P LUMBAR FUSION: ICD-10-CM

## 2024-06-26 DIAGNOSIS — M54.16 LUMBAR RADICULOPATHY: Primary | ICD-10-CM

## 2024-06-26 PROCEDURE — 1036F TOBACCO NON-USER: CPT | Performed by: ANESTHESIOLOGY

## 2024-06-26 PROCEDURE — 99213 OFFICE O/P EST LOW 20 MIN: CPT | Performed by: ANESTHESIOLOGY

## 2024-06-26 PROCEDURE — 3008F BODY MASS INDEX DOCD: CPT | Performed by: ANESTHESIOLOGY

## 2024-06-26 PROCEDURE — 3077F SYST BP >= 140 MM HG: CPT | Performed by: ANESTHESIOLOGY

## 2024-06-26 PROCEDURE — 3078F DIAST BP <80 MM HG: CPT | Performed by: ANESTHESIOLOGY

## 2024-06-26 PROCEDURE — 1125F AMNT PAIN NOTED PAIN PRSNT: CPT | Performed by: ANESTHESIOLOGY

## 2024-06-26 PROCEDURE — 1159F MED LIST DOCD IN RCRD: CPT | Performed by: ANESTHESIOLOGY

## 2024-06-26 SDOH — ECONOMIC STABILITY: FOOD INSECURITY: WITHIN THE PAST 12 MONTHS, THE FOOD YOU BOUGHT JUST DIDN'T LAST AND YOU DIDN'T HAVE MONEY TO GET MORE.: NEVER TRUE

## 2024-06-26 SDOH — ECONOMIC STABILITY: FOOD INSECURITY: WITHIN THE PAST 12 MONTHS, YOU WORRIED THAT YOUR FOOD WOULD RUN OUT BEFORE YOU GOT MONEY TO BUY MORE.: NEVER TRUE

## 2024-06-26 ASSESSMENT — ENCOUNTER SYMPTOMS
DEPRESSION: 0
OCCASIONAL FEELINGS OF UNSTEADINESS: 0
BACK PAIN: 1
LOSS OF SENSATION IN FEET: 0
SHORTNESS OF BREATH: 0
EYE REDNESS: 0

## 2024-06-26 ASSESSMENT — LIFESTYLE VARIABLES
HOW MANY STANDARD DRINKS CONTAINING ALCOHOL DO YOU HAVE ON A TYPICAL DAY: PATIENT DOES NOT DRINK
HOW OFTEN DO YOU HAVE A DRINK CONTAINING ALCOHOL: NEVER

## 2024-06-26 ASSESSMENT — PATIENT HEALTH QUESTIONNAIRE - PHQ9
2. FEELING DOWN, DEPRESSED OR HOPELESS: NOT AT ALL
1. LITTLE INTEREST OR PLEASURE IN DOING THINGS: NOT AT ALL
SUM OF ALL RESPONSES TO PHQ9 QUESTIONS 1 AND 2: 0

## 2024-06-26 ASSESSMENT — PAIN SCALES - GENERAL
PAINLEVEL: 7
PAINLEVEL_OUTOF10: 7

## 2024-06-26 ASSESSMENT — PAIN - FUNCTIONAL ASSESSMENT: PAIN_FUNCTIONAL_ASSESSMENT: 0-10

## 2024-06-26 ASSESSMENT — PAIN DESCRIPTION - DESCRIPTORS: DESCRIPTORS: ACHING;BURNING

## 2024-06-26 NOTE — PROGRESS NOTES
"  Chief Complain  Follow-up (For SI joint injection on 5/10, with 0% relief, injection was very rough and uncomfortable)       History Of Present Illness  Loretta Walsh \"Ramsey" is a 73 y.o. female here for chronic low back pain radiating to the right thigh. The patient rates the pain at 7  on a scale from 0-10.  The patient describes pain as sharp, dull.  The pain is worsened by standing, walking, and going up stairs and is alleviated by sitting and lying down.  Since the last visit the pain has stayed the same.  The patient denies any fever, chills, weight loss, bladder/bowel incontinence.     Previous Procedures:  Bilateral SIJ injection: no relief    Past Medical History  She has a past medical history of Anxiety disorder, unspecified, Asymptomatic menopausal state (02/05/2021), Benign paroxysmal vertigo, unspecified ear (06/21/2022), Chronic kidney disease, stage 3a (Multi) (12/20/2022), Major depressive disorder, single episode, in full remission (CMS-[facility]) (12/20/2022), Morbid (severe) obesity due to excess calories (Multi) (12/20/2022), Other specified disorders of bone density and structure, unspecified site (12/20/2022), Personal history of cervical dysplasia, Personal history of other diseases of the digestive system, Personal history of other diseases of the musculoskeletal system and connective tissue (02/14/2014), Personal history of other diseases of the musculoskeletal system and connective tissue (12/01/2020), Personal history of other diseases of the musculoskeletal system and connective tissue, Personal history of other diseases of the nervous system and sense organs, Personal history of other diseases of the nervous system and sense organs, Personal history of other diseases of the respiratory system (12/01/2020), Personal history of other endocrine, nutritional and metabolic disease, Personal history of other medical treatment (01/06/2021), and Spinal stenosis, site unspecified " "(2022).    Surgical History  She has a past surgical history that includes Total knee arthroplasty (2014); Other surgical history (10/05/2020); Other surgical history (10/05/2020); Other surgical history (10/05/2020); Total knee arthroplasty (2018); Carpal tunnel release (2018);  section, classic (2018); Eye surgery (2018); and Lumbar fusion.     Social History  She reports that she has never smoked. She has never used smokeless tobacco. She reports current alcohol use. She reports that she does not use drugs.    Family History  Family History   Problem Relation Name Age of Onset    Diabetes Father          Allergies  Patient has no known allergies.    Review of Systems  Review of Systems   HENT:  Negative for ear pain.    Eyes:  Negative for redness.   Respiratory:  Negative for shortness of breath.    Cardiovascular:  Negative for chest pain.   Musculoskeletal:  Positive for back pain.   Psychiatric/Behavioral:  Negative for suicidal ideas.         Physical Exam  Physical Exam  HENT:      Head: Normocephalic.   Eyes:      Extraocular Movements: Extraocular movements intact.      Pupils: Pupils are equal, round, and reactive to light.   Pulmonary:      Effort: Pulmonary effort is normal.   Neurological:      Mental Status: She is alert and oriented to person, place, and time.   Psychiatric:         Mood and Affect: Mood normal.           Last Recorded Vitals  Blood pressure 149/62, pulse 50, temperature 36 °C (96.8 °F), resp. rate (!) 50, height 1.575 m (5' 2\"), weight 92.5 kg (204 lb), SpO2 98%.       Assessment/Plan     Loretta Walsh \"\" is a 73 y.o. female here for follow-up of low back pain, tingling numbness of front of the right thigh.  She is status post L4-S1 fusion which resolved her symptoms for several years.  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  Last visit " she had bilateral SI joint injection which did not provide her with any significant relief of pain.  She has been referred to Dr. Benjamin for consideration of extension of her fusion.  Future options may include trial of transforaminal versus interlaminar injection for the foraminal stenosis if she is not considered a good surgical candidate.        Lan Grimes MD

## 2024-07-03 ENCOUNTER — APPOINTMENT (OUTPATIENT)
Dept: NEUROSURGERY | Facility: CLINIC | Age: 74
End: 2024-07-03
Payer: MEDICARE

## 2024-07-25 ENCOUNTER — OFFICE VISIT (OUTPATIENT)
Dept: PAIN MEDICINE | Facility: CLINIC | Age: 74
End: 2024-07-25
Payer: MEDICARE

## 2024-07-25 VITALS
RESPIRATION RATE: 10 BRPM | HEIGHT: 62 IN | DIASTOLIC BLOOD PRESSURE: 70 MMHG | WEIGHT: 207.4 LBS | TEMPERATURE: 97.2 F | BODY MASS INDEX: 38.16 KG/M2 | HEART RATE: 60 BPM | OXYGEN SATURATION: 96 % | SYSTOLIC BLOOD PRESSURE: 120 MMHG

## 2024-07-25 DIAGNOSIS — M54.16 LUMBAR RADICULOPATHY: Primary | ICD-10-CM

## 2024-07-25 PROCEDURE — 99213 OFFICE O/P EST LOW 20 MIN: CPT | Performed by: ANESTHESIOLOGY

## 2024-07-25 PROCEDURE — 3008F BODY MASS INDEX DOCD: CPT | Performed by: ANESTHESIOLOGY

## 2024-07-25 PROCEDURE — 1125F AMNT PAIN NOTED PAIN PRSNT: CPT | Performed by: ANESTHESIOLOGY

## 2024-07-25 PROCEDURE — 1036F TOBACCO NON-USER: CPT | Performed by: ANESTHESIOLOGY

## 2024-07-25 PROCEDURE — 3078F DIAST BP <80 MM HG: CPT | Performed by: ANESTHESIOLOGY

## 2024-07-25 PROCEDURE — 3074F SYST BP LT 130 MM HG: CPT | Performed by: ANESTHESIOLOGY

## 2024-07-25 PROCEDURE — 1159F MED LIST DOCD IN RCRD: CPT | Performed by: ANESTHESIOLOGY

## 2024-07-25 ASSESSMENT — ENCOUNTER SYMPTOMS
DIFFICULTY URINATING: 0
OCCASIONAL FEELINGS OF UNSTEADINESS: 1
WEAKNESS: 1
DEPRESSION: 0
BLOOD IN STOOL: 0
FEVER: 0
EYE REDNESS: 0
SHORTNESS OF BREATH: 0
BACK PAIN: 1
LOSS OF SENSATION IN FEET: 1
ADENOPATHY: 0

## 2024-07-25 ASSESSMENT — ANXIETY QUESTIONNAIRES
GAD7 TOTAL SCORE: 0
IF YOU CHECKED OFF ANY PROBLEMS ON THIS QUESTIONNAIRE, HOW DIFFICULT HAVE THESE PROBLEMS MADE IT FOR YOU TO DO YOUR WORK, TAKE CARE OF THINGS AT HOME, OR GET ALONG WITH OTHER PEOPLE: NOT DIFFICULT AT ALL
5. BEING SO RESTLESS THAT IT IS HARD TO SIT STILL: NOT AT ALL
4. TROUBLE RELAXING: NOT AT ALL
6. BECOMING EASILY ANNOYED OR IRRITABLE: NOT AT ALL
2. NOT BEING ABLE TO STOP OR CONTROL WORRYING: NOT AT ALL
3. WORRYING TOO MUCH ABOUT DIFFERENT THINGS: NOT AT ALL
7. FEELING AFRAID AS IF SOMETHING AWFUL MIGHT HAPPEN: NOT AT ALL
1. FEELING NERVOUS, ANXIOUS, OR ON EDGE: NOT AT ALL

## 2024-07-25 ASSESSMENT — PAIN SCALES - GENERAL
PAINLEVEL: 8
PAINLEVEL_OUTOF10: 8

## 2024-07-25 ASSESSMENT — PAIN DESCRIPTION - DESCRIPTORS: DESCRIPTORS: ACHING;STABBING

## 2024-07-25 ASSESSMENT — PAIN - FUNCTIONAL ASSESSMENT: PAIN_FUNCTIONAL_ASSESSMENT: 0-10

## 2024-07-25 NOTE — H&P (VIEW-ONLY)
"  Chief Complain  Follow-up (SI INJECTION ON 05/10/24 FOLLOW UP. STATED SHE RECEIVED 70% RELIEF FOR 1.5 MONTHS. PAIN LEVEL IS 8 IN RT HIP/LEG. HAD HARD TIME WALKING AROUND LAST WEEK. TYLENOL WITH NO RELIEF)       History Of Present Illness  Loretta Walsh \"Ramsey" is a 73 y.o. female here for chronic low back pain radiating to the right  posterior thigh. The patient rates the pain at 8  on a scale from 0-10.  The patient describes pain as sharp, dull.  The pain is worsened by standing, walking, and going up stairs and is alleviated by sitting and lying down.  Since the last visit the pain has stayed the same.  The patient denies any fever, chills, weight loss, bladder/bowel incontinence.         Past Medical History  She has a past medical history of Anxiety disorder, unspecified, Asymptomatic menopausal state (02/05/2021), Benign paroxysmal vertigo, unspecified ear (06/21/2022), Chronic kidney disease, stage 3a (Multi) (12/20/2022), Major depressive disorder, single episode, in full remission (CMS-[facility]) (12/20/2022), Morbid (severe) obesity due to excess calories (Multi) (12/20/2022), Other specified disorders of bone density and structure, unspecified site (12/20/2022), Personal history of cervical dysplasia, Personal history of other diseases of the digestive system, Personal history of other diseases of the musculoskeletal system and connective tissue (02/14/2014), Personal history of other diseases of the musculoskeletal system and connective tissue (12/01/2020), Personal history of other diseases of the musculoskeletal system and connective tissue, Personal history of other diseases of the nervous system and sense organs, Personal history of other diseases of the nervous system and sense organs, Personal history of other diseases of the respiratory system (12/01/2020), Personal history of other endocrine, nutritional and metabolic disease, Personal history of other medical treatment (01/06/2021), and Spinal " stenosis, site unspecified (2022).    Surgical History  She has a past surgical history that includes Total knee arthroplasty (2014); Other surgical history (10/05/2020); Other surgical history (10/05/2020); Other surgical history (10/05/2020); Total knee arthroplasty (2018); Carpal tunnel release (2018);  section, classic (2018); Eye surgery (2018); and Lumbar fusion.     Social History  She reports that she has never smoked. She has never used smokeless tobacco. She reports current alcohol use. She reports that she does not use drugs.    Family History  Family History   Problem Relation Name Age of Onset    Diabetes Father          Allergies  Patient has no known allergies.    Review of Systems  Review of Systems   Constitutional:  Negative for fever.   HENT:  Negative for ear pain.    Eyes:  Negative for redness.   Respiratory:  Negative for shortness of breath.    Cardiovascular:  Negative for chest pain.   Gastrointestinal:  Negative for blood in stool.   Genitourinary:  Negative for difficulty urinating.   Musculoskeletal:  Positive for back pain.   Skin:  Negative for rash.   Neurological:  Positive for weakness.   Hematological:  Negative for adenopathy.   Psychiatric/Behavioral:  Negative for suicidal ideas.         Physical Exam  Physical Exam  HENT:      Head: Normocephalic.   Eyes:      Extraocular Movements: Extraocular movements intact.      Pupils: Pupils are equal, round, and reactive to light.   Cardiovascular:      Rate and Rhythm: Normal rate.   Pulmonary:      Effort: Pulmonary effort is normal.   Musculoskeletal:      Lumbar back: Normal range of motion.        Legs:    Skin:     General: Skin is warm.   Neurological:      Mental Status: She is alert and oriented to person, place, and time.      Motor: Motor function is intact.      Deep Tendon Reflexes:      Reflex Scores:       Patellar reflexes are 0 on the right side and 0 on the left side.        "Achilles reflexes are 0 on the right side and 0 on the left side.  Psychiatric:         Mood and Affect: Mood normal.           Last Recorded Vitals  Blood pressure 120/70, pulse 60, temperature 36.2 °C (97.2 °F), temperature source Temporal, resp. rate 10, height 1.575 m (5' 2\"), weight 94.1 kg (207 lb 6.4 oz), SpO2 96%.       Assessment/Plan     Loretta Walsh \"Ramsey" is a 73 y.o. female here for follow-up of low back pain, right posterior thigh she is status post L4-S1 fusion which resolved her symptoms for several years.  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  She was evaluated by Dr. Lucas who gave her a course of steroid without any significant benefit, she was also given a few hydrocodone with modest benefit.  I encouraged her to resume the physical therapy as previously ordered.  I would also recommend trial L2 and L3 transforaminal epidural steroid injection as next step in managing her pain.  Also follow-up with neurosurgery for consideration of surgical options if indicated.      Lan Grimes MD  "

## 2024-07-25 NOTE — PROGRESS NOTES
"  Chief Complain  Follow-up (SI INJECTION ON 05/10/24 FOLLOW UP. STATED SHE RECEIVED 70% RELIEF FOR 1.5 MONTHS. PAIN LEVEL IS 8 IN RT HIP/LEG. HAD HARD TIME WALKING AROUND LAST WEEK. TYLENOL WITH NO RELIEF)       History Of Present Illness  Loretta Walsh \"Ramsey" is a 73 y.o. female here for chronic low back pain radiating to the right  posterior thigh. The patient rates the pain at 8  on a scale from 0-10.  The patient describes pain as sharp, dull.  The pain is worsened by standing, walking, and going up stairs and is alleviated by sitting and lying down.  Since the last visit the pain has stayed the same.  The patient denies any fever, chills, weight loss, bladder/bowel incontinence.         Past Medical History  She has a past medical history of Anxiety disorder, unspecified, Asymptomatic menopausal state (02/05/2021), Benign paroxysmal vertigo, unspecified ear (06/21/2022), Chronic kidney disease, stage 3a (Multi) (12/20/2022), Major depressive disorder, single episode, in full remission (CMS-[facility]) (12/20/2022), Morbid (severe) obesity due to excess calories (Multi) (12/20/2022), Other specified disorders of bone density and structure, unspecified site (12/20/2022), Personal history of cervical dysplasia, Personal history of other diseases of the digestive system, Personal history of other diseases of the musculoskeletal system and connective tissue (02/14/2014), Personal history of other diseases of the musculoskeletal system and connective tissue (12/01/2020), Personal history of other diseases of the musculoskeletal system and connective tissue, Personal history of other diseases of the nervous system and sense organs, Personal history of other diseases of the nervous system and sense organs, Personal history of other diseases of the respiratory system (12/01/2020), Personal history of other endocrine, nutritional and metabolic disease, Personal history of other medical treatment (01/06/2021), and Spinal " stenosis, site unspecified (2022).    Surgical History  She has a past surgical history that includes Total knee arthroplasty (2014); Other surgical history (10/05/2020); Other surgical history (10/05/2020); Other surgical history (10/05/2020); Total knee arthroplasty (2018); Carpal tunnel release (2018);  section, classic (2018); Eye surgery (2018); and Lumbar fusion.     Social History  She reports that she has never smoked. She has never used smokeless tobacco. She reports current alcohol use. She reports that she does not use drugs.    Family History  Family History   Problem Relation Name Age of Onset    Diabetes Father          Allergies  Patient has no known allergies.    Review of Systems  Review of Systems   Constitutional:  Negative for fever.   HENT:  Negative for ear pain.    Eyes:  Negative for redness.   Respiratory:  Negative for shortness of breath.    Cardiovascular:  Negative for chest pain.   Gastrointestinal:  Negative for blood in stool.   Genitourinary:  Negative for difficulty urinating.   Musculoskeletal:  Positive for back pain.   Skin:  Negative for rash.   Neurological:  Positive for weakness.   Hematological:  Negative for adenopathy.   Psychiatric/Behavioral:  Negative for suicidal ideas.         Physical Exam  Physical Exam  HENT:      Head: Normocephalic.   Eyes:      Extraocular Movements: Extraocular movements intact.      Pupils: Pupils are equal, round, and reactive to light.   Cardiovascular:      Rate and Rhythm: Normal rate.   Pulmonary:      Effort: Pulmonary effort is normal.   Musculoskeletal:      Lumbar back: Normal range of motion.        Legs:    Skin:     General: Skin is warm.   Neurological:      Mental Status: She is alert and oriented to person, place, and time.      Motor: Motor function is intact.      Deep Tendon Reflexes:      Reflex Scores:       Patellar reflexes are 0 on the right side and 0 on the left side.        "Achilles reflexes are 0 on the right side and 0 on the left side.  Psychiatric:         Mood and Affect: Mood normal.           Last Recorded Vitals  Blood pressure 120/70, pulse 60, temperature 36.2 °C (97.2 °F), temperature source Temporal, resp. rate 10, height 1.575 m (5' 2\"), weight 94.1 kg (207 lb 6.4 oz), SpO2 96%.       Assessment/Plan     Loretta Walsh \"Ramsey" is a 73 y.o. female here for follow-up of low back pain, right posterior thigh she is status post L4-S1 fusion which resolved her symptoms for several years.  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  She was evaluated by Dr. Lucas who gave her a course of steroid without any significant benefit, she was also given a few hydrocodone with modest benefit.  I encouraged her to resume the physical therapy as previously ordered.  I would also recommend trial L2 and L3 transforaminal epidural steroid injection as next step in managing her pain.  Also follow-up with neurosurgery for consideration of surgical options if indicated.      Lan Grimes MD  "

## 2024-07-31 ENCOUNTER — EVALUATION (OUTPATIENT)
Dept: PHYSICAL THERAPY | Facility: CLINIC | Age: 74
End: 2024-07-31
Payer: MEDICARE

## 2024-07-31 DIAGNOSIS — Z98.1 ADJACENT SEGMENT DISEASE OF LUMBAR SPINE WITH HISTORY OF FUSION PROCEDURE: ICD-10-CM

## 2024-07-31 DIAGNOSIS — M48.061 FORAMINAL STENOSIS OF LUMBAR REGION: Primary | ICD-10-CM

## 2024-07-31 DIAGNOSIS — M51.36 ADJACENT SEGMENT DISEASE OF LUMBAR SPINE WITH HISTORY OF FUSION PROCEDURE: ICD-10-CM

## 2024-07-31 PROCEDURE — 97161 PT EVAL LOW COMPLEX 20 MIN: CPT | Mod: GP

## 2024-07-31 PROCEDURE — 97110 THERAPEUTIC EXERCISES: CPT | Mod: GP

## 2024-07-31 ASSESSMENT — COLUMBIA-SUICIDE SEVERITY RATING SCALE - C-SSRS
1. IN THE PAST MONTH, HAVE YOU WISHED YOU WERE DEAD OR WISHED YOU COULD GO TO SLEEP AND NOT WAKE UP?: NO
2. HAVE YOU ACTUALLY HAD ANY THOUGHTS OF KILLING YOURSELF?: NO

## 2024-07-31 ASSESSMENT — ENCOUNTER SYMPTOMS
DEPRESSION: 0
LOSS OF SENSATION IN FEET: 0
OCCASIONAL FEELINGS OF UNSTEADINESS: 0

## 2024-07-31 NOTE — Clinical Note
August 22, 2024    [place]  [address] [facility] [place] [address]    Patient: Ludwin Walsh   YOB: 1950   Date of Visit: 7/31/2024       Dear Jer Cervantes MD  [address] [place] [address]    The attached plan of care is being sent to you because your patient’s medical reimbursement requires that you certify the plan of care. Your signature is required to allow uninterrupted insurance coverage.      You may indicate your approval by signing below and faxing this form back to us at Dept Fax: [phone].    Please call Dept: [phone] with any questions or concerns.    Thank you for this referral,        Marianne Arreguin PT  [facility] [address] [facility]  [address] [facility] [address]    Payer: Payor: MEDICARE / Plan: MEDICARE PART A AND B / Product Type: *No Product type* /                                                                         Date:     Dear Marianne Arreguin PT,     Re: Ms. Loretta Walsh, MRN:[id number]    I certify that I have reviewed the attached plan of care and it is medically necessary for Ms. Loretta Walsh (1950) who is under my care.          ______________________________________                    _________________  Provider name and credentials                                           Date and time                                                                                           Plan of Care 7/31/24   Effective from: 7/31/2024  Effective to: 10/29/2024    Plan ID: [id number]            Participants as of Finalize on 8/22/2024    Name Type Comments Contact Info    Jer Cervantes MD Referring Provider  [phone]    Marianne Arreguin PT Physical Therapist  [phone]       Last Plan Note     Author: Margy Mckeon PTA Status: Cosign Needed Last edited: 8/22/2024  8:45 AM          Physical Therapy  Physical Therapy Progress Note    Patient Name Loretta Walsh   MRN: [id number]  Today's Date:  08/22/24  Time Calculation  Start Time: 0845  Stop Time: 0930  Time Calculation (min): 45 min    Insurance:    (per information provided by  pre-cert team)  Visit #:  5   Approval required:  N  Based on medical necessity  Approval/certification dates:   7/31/2024 to 10/29/2024    Therapy Diagnoses:   1. Foraminal stenosis of lumbar region  Follow Up In Physical Therapy      2. Adjacent segment disease of lumbar spine with history of fusion procedure  Follow Up In Physical Therapy        General:  Reason for visit: Lumbar radiculopathy   Referred by: Dr Billy Beal MD appt:  9/30/2024  Preferred Name:  Ludwin  Script:  PT Eval and treat  Onset Date:  6/5/2024    Assessment:  Progressed patient with addition of current this date to LE strengthening exercises.  Able to tolerate increased resistance and maintain balance with ex.  Improved tolerance to   Patient needs continued work on/skilled PT for: exercises to strengthening core and work on lumbar mobility without exacerbating her symptoms to address remaining functional, objective and subjective deficits to allow them to return to prior /optimal level of function with ADLs.  Patient is progressing with goals: mobility, strength, endurance, flexibility  Skilled care:  Aquatic exercise progression, patient education    Plan:    Continue to progress per poc:   Add retro amb as able.  Add hamstring curl against the current as able.     Subjective:   Patient reports: she still feels like there is a little elf running around in her R. Quad, causing sharp pain to cease walking or task she is performing at the time.  Patient will be on a cruise for two weeks after today's visit.  Plans on walking in water and performing aquatic HEP.  Patient declined printouts for aquatic ex, stating she remembers them.     Have you fallen since last visit:  no    Precautions: low fall risk  HPI: 3/3/2021, patient had lumbar fusion L4-S1, now patient is experiencing pain related to L3-4  "including stenosis as identified on recent MRI. Pt has had 1 injection which didn't help.     Pain:  6/10  Location/Type of pain:  Aching lilian buttock and right anterior thigh, increases to sharp with standing ex in pool    HEP compliance/understanding:  yes    Objective:   Objective Measurements:    Gait:  Improved tolerance to TM ambulation without pain until 5'  Posture:  Improved posture with TM amb this date.       Treatment:   **= HEP  NV= Next visit  np= not performed  nb= non-billable  G= group HEP= discharged to HEP  **FLOATER** donned 3# BLE weight**     Aquatics:          45 minutes  Hamstring/Hip Flexor stair stretchs 30\" x 2 lilian, each  Lateral amb x 3 laps  TM fwd amb T=0, C=0 x 5'    @TM bars  C=8 HR 2 x 10, facing C  C=8 Mini squat x10 facing C, x 10 facing away from C  C=8 3 Way Hip x 10 lilian, each alternating, kicking against C each direction  C=8 Hip circles cw/ccwx 10 lilian, each  C=8 Marching 2 X 10, facing C  C=8 Ant/post weight shifts x 10 each LE forward, facing C    BTW:  Can Cans x 10 lilian, alternating      Education:  V/c and demonstration by therapist on deck for correct technique and posture with aquatic exercises.   HEP Progression:  n/a           Current Participants as of 8/22/2024    Name Type Comments Contact Info    Jer Cervantes MD Referring Provider  [phone]    Signature pending    Marianne Arreguin PT Physical Therapist  [phone]    Signature pending      "

## 2024-07-31 NOTE — PROGRESS NOTES
Patient Name Loretta Walsh  MRN: [id number]  Today's Date: 24  Time Calculation  Start Time: 0900  Stop Time: 945  Time Calculation (min): 45 min    Insurance:   Visit #: 1  Insurance Reviewed  (per information provided by  pre-cert team)   Authorization required:  N  Approved # of visits: MN  Authorization/MCR certification date range:  2024 to 10/29/2024    Therapy Diagnoses:   Problem List Items Addressed This Visit             ICD-10-CM    Foraminal stenosis of lumbar region - Primary M48.061    Relevant Orders    Follow Up In Physical Therapy    Adjacent segment disease of lumbar spine with history of fusion procedure M51.36, Z98.1    Relevant Orders    Follow Up In Physical Therapy     General:  Reason for visit: Lumbar radiculopathy   Referred by: Dr Billy Beal MD appt:  2024  Preferred Name:  Lduwin  Script:  PT Eval and treat  Onset Date:  2024    Assessment:    Pt presents with lumbar radicular pain to right anterior thigh for approx 1 1/2 months with recent diagnosis of lumbar stenosis. Pt will benefit from skilled PT for core strengthening, postural re-education, and progressive HEP to address her symptoms and impairments    Problems:    Pain:  _x__  Posture/Body mechanics deficits:  __x_  Decreased knowledge HEP:  _x__  Decreased knowledge of Precautions:  __x_  Activity Limitations:   _x__  ADL's/IADL's/Self-care skills:  _x__  ROM/Joint Mobility:  _x__  Strength:  _x__  Decreased functional level:   __x_  Flexibility:  _x__  Tenderness/decreased soft tissue mobility:  _x__  Gait/Stairs:  ___  Fall Risk:  _x__  Balance:  ___  Edema:  ___  Participation restrictions:  ___  Sensory:  ___  Transfers:  ___  Decreased knowledge of brace:   ___  Other:  ___    X Indicates included in problem list    Goals:      ST weeks  Increased postural awareness    Compliant with HEP.     LTG:  by discharge  Increased postural awareness and posture WFL.     pain by 50% and patient I  with self-management of symptoms.     Decreased pain and increased function with ADL's and IADL's.  Improve Oswestry to: 20% limitation of function.    Normal gait on level and uneven surfaces community level distances for improved function in the community.    Normal reciprocal pattern on stairs for improved function to all levels of home.      Increase trunk AROM to WFL for flexion and SB for improved function with dressing and driving.      Increase trunk strength and stability and R/L LE strength to WFL for improved function with chores and recreational activities.      Increase B LE flexibility to WFL.      Decrease B upper/lower quarter tenderness 50-75% per patient report.    Decrease R LE radicular symptoms 50-75% per patient report.      I and compliant with HEP and proper back care.       Rehab potential to achieve the above goals is good.    Patient is aware of diagnosis and prognosis and agrees with established plan of care and goals.    Plan:   Skilled PT 2 x/week for 5 weeks( Until goals achieved, maximum rehab potential has been achieved, or patient has plateaued)  for:    Aquatics  _____  CP   __x__  Dry needling  ____  Education  _x___  Electrical Stimulation  _x___  Gait training  ____  HEP  __x__  HP  ____  Manual  _x___  Mechanical Traction  ____  NMR  _x___  Self-care/home management  _x___  Therapeutic Exercise   _x___  Therapeutic Activities  __x__    ____  Work Conditioning  ____  Re-assessment  _x___  Other  ____    X Indicates included in treatment plan    PT for Nu-step for functional mobility and soft tissue warm up for more efficient stretching,and core strengthening at follow up visit    Subjective:    HPI: 3/3/2021, patient had lumbar fusion L4-S1, now patient is experiencing pain related to L3-4 including stenosis as identified on recent MRI. Pt has had 1 injection which didn't help.     Pain:    Pain Constant 10     Type of pain:  Constant, sharp pain to lilian LS region and into  right buttock and wrapping around to front of thigh which is also sharp/stabbing. Pt states that it started with numbness in right anterior thigh but changed to pain.  What increases pain: standing and walking  What decreases pain:  sitting    Medical Hx/ HTN that is well controlled  Precautions:     Adult Screening Risk:      Fall Risk:  low    Patient goal:  Reduce pain before she goes on vacation  Patient is aware of diagnosis and prognosis.    Living Environment:    Social Support:  lives with:    DME: Pt has walker that she hasn't had to use; She uses a cane periodically    Prior Function:   Prior to 1 1/2 months ago, patient had been functioning pretty well.    Function:    A and O x 3  Sleep:  Disturbed sleep; rolls to 1 side than the other  ADL's:  No issues, except  supervises her getting in and out of the shower  Chores: Very limited. 45 minutes unloading   Driving:  drives due to patient's fear from right leg pain  Work: Retired  Recreation: looking forward to upcoming cruise  Sitting:  Relieves pain  Standing:   Within a few minutes  Walking: Within a few minutes    Objective:        Outcome Measures:  Other Measures  Oswestry Disablity Index (FRANC): 60     Posture:  Decreased lumbar lordosis    Gait/Stairs: Pt ambulates WNL but with forward head/rounded shoulders/ Takes steps one at a time due to weakness of right LE    Palpation: n/a    ROM:    Trunk:  Flexion:  3rd to mid shin  Extension: 0  RSB:  3rd to mid lateral thigh LSB:  3rd to mid lateral thigh    MMT: Hip  Abd: R 3+; L 4-  Ext: R/L 2+  B LE myotomes: WFL    Flexibility:    Hamstrings:  R: 90    L: 90  Hip flexors: mild    Treatment:    Evaluation:  30 minutes  Therapeutic Ex:Access Code: [email or web address]  URL: [email or web address]  Date: 07/31/2024  Prepared by: Marianne Arreguin    Exercises  - Supine Transversus Abdominis Bracing - Hands on Stomach  - 1-2 x daily - 7 x weekly - 1 sets - 10 reps - 5 count  hold  - Supine Hip Adduction Isometric with Ball  - 1-2 x daily - 7 x weekly - 1-2 sets - 10 reps - 5-10 count hold  - Hooklying Clamshell with Resistance  - 1-2 x daily - 7 x weekly - 1-2 sets - 10 reps - 3 count hold  - Small Range Straight Leg Raise  - 1-2 x daily - 7 x weekly - 1 sets - 10 reps - 3 seconds hold  - Seated March  - 1 x daily - 7 x weekly - 1 sets - 10 reps  - Seated Long Arc Quad  - 1-2 x daily - 7 x weekly - 1-2 sets - 10 reps - 3-5 count hold    Education:  poc, anatomy, physiology, posture, body mechanics, safety awareness, HEP.  Preferred learning:  pictures, demonstration.  Demonstrated good understanding.

## 2024-08-02 ENCOUNTER — TELEPHONE (OUTPATIENT)
Dept: INFUSION THERAPY | Facility: CLINIC | Age: 74
End: 2024-08-02
Payer: MEDICARE

## 2024-08-02 DIAGNOSIS — M48.061 FORAMINAL STENOSIS OF LUMBAR REGION: Primary | ICD-10-CM

## 2024-08-04 RX ORDER — DIAZEPAM 5 MG/1
5 TABLET ORAL ONCE
Qty: 1 TABLET | Refills: 0 | Status: SHIPPED | OUTPATIENT
Start: 2024-08-04 | End: 2024-08-04

## 2024-08-05 ENCOUNTER — APPOINTMENT (OUTPATIENT)
Dept: PHYSICAL THERAPY | Facility: CLINIC | Age: 74
End: 2024-08-05
Payer: MEDICARE

## 2024-08-08 ENCOUNTER — TREATMENT (OUTPATIENT)
Dept: PHYSICAL THERAPY | Facility: CLINIC | Age: 74
End: 2024-08-08
Payer: MEDICARE

## 2024-08-08 DIAGNOSIS — M51.36 ADJACENT SEGMENT DISEASE OF LUMBAR SPINE WITH HISTORY OF FUSION PROCEDURE: ICD-10-CM

## 2024-08-08 DIAGNOSIS — M48.061 FORAMINAL STENOSIS OF LUMBAR REGION: ICD-10-CM

## 2024-08-08 DIAGNOSIS — Z98.1 ADJACENT SEGMENT DISEASE OF LUMBAR SPINE WITH HISTORY OF FUSION PROCEDURE: ICD-10-CM

## 2024-08-08 PROCEDURE — 97110 THERAPEUTIC EXERCISES: CPT | Mod: GP,CQ

## 2024-08-08 PROCEDURE — 97112 NEUROMUSCULAR REEDUCATION: CPT | Mod: GP,CQ

## 2024-08-08 PROCEDURE — 97140 MANUAL THERAPY 1/> REGIONS: CPT | Mod: GP,CQ

## 2024-08-08 NOTE — PROGRESS NOTES
Physical Therapy  Physical Therapy Progress Note    Patient Name Loretta Walsh   MRN: [id number]  Today's Date: 8/8/2024  Time Calculation  Start Time: 1145  Stop Time: 1230  Time Calculation (min): 45 min    Insurance:    (per information provided by  pre-cert team)  Visit #: 2    Approval required:  N  Based on medical necessity  Approval/certification dates:   7/31/2024 to 10/29/2024       Therapy Diagnoses:   1. Foraminal stenosis of lumbar region  Follow Up In Physical Therapy      2. Adjacent segment disease of lumbar spine with history of fusion procedure  Follow Up In Physical Therapy          General:  Reason for visit: Lumbar radiculopathy   Referred by: Dr Billy Beal MD appt:  9/30/2024  Preferred Name:  Ludwin  Script:  PT Eval and treat  Onset Date:  6/5/2024    Assessment:  Patient tolerated exercises per log with modifications to seated secondary to increased radicular symptoms in R. LE and fatigue.  V/c for maintaining core engagement from transitioning to supine to standing/seated DLS ex.  Difficulty stretching R. Hip flexor.  Positive response to manual interventions.   Patient needs continued work on/skilled PT for: core strengthening, mobility, flexibility, and postural re-education to address remaining functional, objective and subjective deficits to allow them to return to prior /optimal level of function with ADLs.  Patient is progressing with goals: flexibility, strength, mobility  Skilled care:  Manual, Therapeutic exercise, NMR, patient education    Plan:    Continue to progress per poc:   NV trial Aquatics.     Subjective:   Patient reports:  she is getting an injection tomorrow. Patient leaves for a cruise in three weeks. Patient is seeing a Neurosurgeon at the end of September.  Reviewed HEP.      Have you fallen since last visit:  no    Precautions: low fall risk  HPI: 3/3/2021, patient had lumbar fusion L4-S1, now patient is experiencing pain related to L3-4 including stenosis as  "identified on recent MRI. Pt has had 1 injection which didn't help.     Pain:  8/10  Location/Type of pain:  R. Glute and R. Quad/tightness, sharp, aching    HEP compliance/understanding:  yes    Objective:   Objective Measurements:    Function:  sleep interrupted, sleeps half the night in a recliner  Posture: unable to lie in hooklying for a prolonged period of time secondary to increased radicular pain inR. LE      Treatment:   **= HEP  NV= Next visit  np= not performed  nb= non-billable  G= group HEP= discharged to HEP      Therapeutic Exercise:     15 minutes  Nustep L1 x 10'  Hamstring stretch 30\" x 2 lilian  Hip flexor stair stretch 30\" x 2 L. LE, 1x R. LE - ceased due to pain    Manual Therapy:     10 minutes  STM to R. Lumar region/R. Glute in prone  R. Inferior pelvic glides in prone    Neuromuscular Re-education:    20 minutes  YTB high pulldown/shoulder extension to body seated 2 x 10 each  YTB Chops standing 2 x 10 each side  PPT 5\" hold x 15  PPT w/isometric adduction w/ball 5\" hold x 10      Aquatics:            minutes          Education:  V/c for correct technique and posture with exercises.    HEP Progression:  N/A      "

## 2024-08-09 ENCOUNTER — HOSPITAL ENCOUNTER (OUTPATIENT)
Dept: PAIN MEDICINE | Facility: CLINIC | Age: 74
Discharge: HOME | End: 2024-08-09
Payer: MEDICARE

## 2024-08-09 VITALS
HEART RATE: 60 BPM | RESPIRATION RATE: 18 BRPM | TEMPERATURE: 97.7 F | OXYGEN SATURATION: 98 % | SYSTOLIC BLOOD PRESSURE: 165 MMHG | DIASTOLIC BLOOD PRESSURE: 58 MMHG

## 2024-08-09 DIAGNOSIS — M54.16 LUMBAR RADICULOPATHY: ICD-10-CM

## 2024-08-09 PROCEDURE — 2500000004 HC RX 250 GENERAL PHARMACY W/ HCPCS (ALT 636 FOR OP/ED): Performed by: ANESTHESIOLOGY

## 2024-08-09 PROCEDURE — 64484 NJX AA&/STRD TFRM EPI L/S EA: CPT | Performed by: ANESTHESIOLOGY

## 2024-08-09 PROCEDURE — 2550000001 HC RX 255 CONTRASTS: Performed by: ANESTHESIOLOGY

## 2024-08-09 PROCEDURE — 64483 NJX AA&/STRD TFRM EPI L/S 1: CPT | Performed by: ANESTHESIOLOGY

## 2024-08-09 PROCEDURE — 2500000005 HC RX 250 GENERAL PHARMACY W/O HCPCS: Performed by: ANESTHESIOLOGY

## 2024-08-09 RX ORDER — SODIUM CHLORIDE 9 MG/ML
INJECTION, SOLUTION INTRAMUSCULAR; INTRAVENOUS; SUBCUTANEOUS AS NEEDED
Status: COMPLETED | OUTPATIENT
Start: 2024-08-09 | End: 2024-08-09

## 2024-08-09 RX ORDER — LIDOCAINE HYDROCHLORIDE 10 MG/ML
INJECTION, SOLUTION EPIDURAL; INFILTRATION; INTRACAUDAL; PERINEURAL AS NEEDED
Status: COMPLETED | OUTPATIENT
Start: 2024-08-09 | End: 2024-08-09

## 2024-08-09 RX ORDER — DEXAMETHASONE SODIUM PHOSPHATE 10 MG/ML
INJECTION INTRAMUSCULAR; INTRAVENOUS AS NEEDED
Status: COMPLETED | OUTPATIENT
Start: 2024-08-09 | End: 2024-08-09

## 2024-08-09 RX ORDER — METHYLPREDNISOLONE ACETATE 40 MG/ML
INJECTION, SUSPENSION INTRA-ARTICULAR; INTRALESIONAL; INTRAMUSCULAR; SOFT TISSUE AS NEEDED
Status: COMPLETED | OUTPATIENT
Start: 2024-08-09 | End: 2024-08-09

## 2024-08-09 ASSESSMENT — ENCOUNTER SYMPTOMS
LOSS OF SENSATION IN FEET: 1
OCCASIONAL FEELINGS OF UNSTEADINESS: 1
DEPRESSION: 0

## 2024-08-09 ASSESSMENT — PAIN SCALES - GENERAL
PAINLEVEL_OUTOF10: 10 - WORST POSSIBLE PAIN
PAINLEVEL_OUTOF10: 10 - WORST POSSIBLE PAIN

## 2024-08-09 ASSESSMENT — PAIN - FUNCTIONAL ASSESSMENT
PAIN_FUNCTIONAL_ASSESSMENT: 0-10
PAIN_FUNCTIONAL_ASSESSMENT: 0-10

## 2024-08-09 NOTE — PROCEDURES
"Procedure Note     Date: 2024  OR Location: [facility] NON-OR PROCEDURES    Name: Loretta Walsh \"Ludwin\", : 1950, Age: 73 y.o., MRN: [id number], Sex: female    Diagnosis  Preprocedure diagnosis: Lumbar radiculopathy  Postprocedure diagnosis: Same    Procedures  Lumbar transforaminal epidural steroid injection    The patient was seen in the preoperative area. The risks, benefits, complications, treatment options, non-operative alternatives, expected recovery and outcomes were discussed with the patient. The patient concurred with the proposed plan, giving informed consent.          Procedure: The risks and benefits of treatment options and alternatives were discussed with the patient, and consent was obtained for a cervical epidural steroid injection. She wishes to proceed. She was placed in a prone position. The area overlying the right L2-3 and L3-4 spacew was cleaned with ChloraPrep solution and draped using standard sterile precautions. Skin was anesthetized with 1% lidocaine. Two 5 inch 25-gauge spinal needles were advanced with AP, lateral, oblique fluoroscopy to the right L2-3 and L3-4 transforaminal spaces. No paresthesias were induced. 2 cc of Omnipaque was injected demonstrating spread of the dye covering the L2 and L3 nerve roots as well as in the epidural spaces. No intravascular spread was noticed. After negative aspiration for blood and CSF, a solution containing 20 mg of dexamethasone and 4 mL of 0.5% ropivacaine was injected divided equally without inducing paresthesia or pain. Patient was transferred to recovery in stable condition and subsequently discharged home.         Complications:  None; patient tolerated the procedure well.    Disposition: Home  Condition: stable         Additional Details: NA    Attending Attestation: I performed the procedure.    Lan Grimes MD     "

## 2024-08-09 NOTE — Clinical Note
Patient tolerated the procedure well and is comfortable with no complaints of pain. Vital signs stable. Arousable prior to transport. Patient transported to PACU via stretcher/wheelchair. Handoff completed.

## 2024-08-14 ENCOUNTER — TREATMENT (OUTPATIENT)
Dept: PHYSICAL THERAPY | Facility: CLINIC | Age: 74
End: 2024-08-14
Payer: MEDICARE

## 2024-08-14 ENCOUNTER — APPOINTMENT (OUTPATIENT)
Dept: PHYSICAL THERAPY | Facility: CLINIC | Age: 74
End: 2024-08-14
Payer: MEDICARE

## 2024-08-14 DIAGNOSIS — Z98.1 ADJACENT SEGMENT DISEASE OF LUMBAR SPINE WITH HISTORY OF FUSION PROCEDURE: ICD-10-CM

## 2024-08-14 DIAGNOSIS — M48.061 FORAMINAL STENOSIS OF LUMBAR REGION: ICD-10-CM

## 2024-08-14 DIAGNOSIS — M51.36 ADJACENT SEGMENT DISEASE OF LUMBAR SPINE WITH HISTORY OF FUSION PROCEDURE: ICD-10-CM

## 2024-08-14 PROCEDURE — 97113 AQUATIC THERAPY/EXERCISES: CPT | Mod: GP,CQ

## 2024-08-14 NOTE — PROGRESS NOTES
Physical Therapy  Physical Therapy Progress Note    Patient Name Loretta Walsh   MRN: [id number]  Today's Date: 8/14/2024  Time Calculation  Start Time: 1100  Stop Time: 1138  Time Calculation (min): 38 min    Insurance:    (per information provided by  pre-cert team)  Visit #: 3    Approval required:  N  Based on medical necessity  Approval/certification dates:   7/31/2024 to 10/29/2024       Therapy Diagnoses:   1. Foraminal stenosis of lumbar region  Follow Up In Physical Therapy      2. Adjacent segment disease of lumbar spine with history of fusion procedure  Follow Up In Physical Therapy          General:  Reason for visit: Lumbar radiculopathy   Referred by: Dr Billy Beal MD appt:  9/30/2024  Preferred Name:  Ludwin  Script:  PT Eval and treat  Onset Date:  6/5/2024    Assessment:  Patient guarded and transitioned gingerly across pool.  Improved trunk control against buoyancy with lilian ankle weight donned and skit of swimsuit secured to avoid drag form.  Frequent v/c to engage glutes and core to maintain COG over DOC.  Required frequent breaks secondary to pain in R. Quad.  Fatigued at end of session.   Patient needs continued work on/skilled PT for: core strengthening, mobility, flexibility, and postural re-education to address remaining functional, objective and subjective deficits to allow them to return to prior /optimal level of function with ADLs.  Patient is progressing with goals: flexibility, strength, mobility  Skilled care:  Aquatic exercise instruction, patient education    Plan:    Continue to progress per poc: with aquatic treatment sessions going forward.    Increase reps as able.  Increase TM amb time as able.     Subjective:   Patient reports:  she is concerned about navigating a cruise ship that she leaves for on 8/28.  Had the injection on Friday, but has not had any relief yet.  Sore for two days after last visit.    Have you fallen since last visit:  no    Precautions: low fall  "risk  HPI: 3/3/2021, patient had lumbar fusion L4-S1, now patient is experiencing pain related to L3-4 including stenosis as identified on recent MRI. Pt has had 1 injection which didn't help.     Pain:  8/10  Location/Type of pain:  R. Glute and R. Quad/tightness, sharp, aching    HEP compliance/understanding:  yes    Objective:   Objective Measurements:    Function:  unable to sleep last night secondary to pain  Posture: lumbar lordosis, forward flexed trunk      Treatment:   **= HEP  NV= Next visit  np= not performed  nb= non-billable  G= group HEP= discharged to HEP    **FLOATER** donned 3# BLE weight**  Aquatics:          38 minutes  Hamstring/Hip Flexor stair stretchs 30\" x 2 lilian, each  Lateral amb x 2 laps  TM fwd amb T=0, C=0 x 2' 30\"    @TM bars  HR x 20  Mini squat 2 x 0  3 Way Hip x 10 lilian, each alternating  Hip circles cw/ccwx 10 lilian, each  Marching x 20  Ant/post weight shifts x 10 each LE forward    Education: V/c and demonstration by therapist on deck for correct technique and posture with aquatic exercises.  HEP Progression:  N/A  "

## 2024-08-16 ENCOUNTER — TREATMENT (OUTPATIENT)
Dept: PHYSICAL THERAPY | Facility: CLINIC | Age: 74
End: 2024-08-16
Payer: MEDICARE

## 2024-08-16 DIAGNOSIS — M48.061 FORAMINAL STENOSIS OF LUMBAR REGION: ICD-10-CM

## 2024-08-16 DIAGNOSIS — M51.36 ADJACENT SEGMENT DISEASE OF LUMBAR SPINE WITH HISTORY OF FUSION PROCEDURE: ICD-10-CM

## 2024-08-16 DIAGNOSIS — Z98.1 ADJACENT SEGMENT DISEASE OF LUMBAR SPINE WITH HISTORY OF FUSION PROCEDURE: ICD-10-CM

## 2024-08-16 PROCEDURE — 97113 AQUATIC THERAPY/EXERCISES: CPT | Mod: GP

## 2024-08-16 NOTE — PROGRESS NOTES
Physical Therapy  Physical Therapy Progress Note    Patient Name Loretta Walsh   MRN: [id number]  Today's Date: 08/16/24  Time Calculation  Start Time: 0745  Stop Time: 0823  Time Calculation (min): 38 min    Insurance:    (per information provided by  pre-cert team)  Visit #:  4   Approval required:  N  Based on medical necessity  Approval/certification dates:   7/31/2024 to 10/29/2024    Therapy Diagnoses:   1. Foraminal stenosis of lumbar region  Follow Up In Physical Therapy      2. Adjacent segment disease of lumbar spine with history of fusion procedure  Follow Up In Physical Therapy        General:  Reason for visit: Lumbar radiculopathy   Referred by: Dr Billy Beal MD appt:  9/30/2024  Preferred Name:  Ludwin  Script:  PT Eval and treat  Onset Date:  6/5/2024    Assessment:  Patient tolerated treatment subjective right thigh pain at times after each ex, but with a pause it subsides.  Pt had difficulty doing BTW core engagement due to increased back pain.   Patient needs continued work on/skilled PT for: exercises to strengthening core and work on lumbar mobility without exacerbating her symptoms to address remaining functional, objective and subjective deficits to allow them to return to prior /optimal level of function with ADLs.  Patient is progressing with goals: not yet  Skilled care:  ex guidance     Plan:    Continue to progress per poc:   NV add additional seated ex on pool steps as able.    Subjective:   Patient reports:  no change in pain    Have you fallen since last visit:  no    Precautions: low fall risk  HPI: 3/3/2021, patient had lumbar fusion L4-S1, now patient is experiencing pain related to L3-4 including stenosis as identified on recent MRI. Pt has had 1 injection which didn't help.     Pain:  7/10  Location/Type of pain:  Aching lilian buttock and right anterior thigh, increases to sharp with standing ex in pool    HEP compliance/understanding:  yes    Objective:   Objective  "Measurements:    Function:   Pt states she can get out of bed easier, and roll in bed without grabbing her right leg  Posture: Pt does ex and walks on TM in a flexed posture for better tolerance  Gait:  Balance:   ROM:    Strength:  Flexibility:      Treatment:   **= HEP  NV= Next visit  np= not performed  nb= non-billable  G= group HEP= discharged to [facility]    Aquatics:          38 minutes  Hamstring/Hip Flexor stair stretchs 30\" x 2 lilain, each  Lateral amb x 2 laps  TM fwd amb T=0, C=0 x 4' but noted leg pain at the end    @TM bars  HR x 20  Mini squat 2 x 10  3 Way Hip x 10 lilian, each alternating  Hip circles cw/ccwx 10 lilian, each  Marching x 20  Ant/post weight shifts x 10 each LE forward        Education:  ex form and posture  HEP Progression:  n/a    "

## 2024-08-20 ENCOUNTER — APPOINTMENT (OUTPATIENT)
Dept: PHYSICAL THERAPY | Facility: CLINIC | Age: 74
End: 2024-08-20
Payer: MEDICARE

## 2024-08-22 ENCOUNTER — TREATMENT (OUTPATIENT)
Dept: PHYSICAL THERAPY | Facility: CLINIC | Age: 74
End: 2024-08-22
Payer: MEDICARE

## 2024-08-22 DIAGNOSIS — M51.36 ADJACENT SEGMENT DISEASE OF LUMBAR SPINE WITH HISTORY OF FUSION PROCEDURE: ICD-10-CM

## 2024-08-22 DIAGNOSIS — Z98.1 ADJACENT SEGMENT DISEASE OF LUMBAR SPINE WITH HISTORY OF FUSION PROCEDURE: ICD-10-CM

## 2024-08-22 DIAGNOSIS — M48.061 FORAMINAL STENOSIS OF LUMBAR REGION: ICD-10-CM

## 2024-08-22 PROCEDURE — 97113 AQUATIC THERAPY/EXERCISES: CPT | Mod: GP,CQ

## 2024-08-22 NOTE — PROGRESS NOTES
Physical Therapy  Physical Therapy Progress Note    Patient Name Loretta Walsh   MRN: [id number]  Today's Date: 08/22/24  Time Calculation  Start Time: 0845  Stop Time: 0930  Time Calculation (min): 45 min    Insurance:    (per information provided by  pre-cert team)  Visit #:  5   Approval required:  N  Based on medical necessity  Approval/certification dates:   7/31/2024 to 10/29/2024    Therapy Diagnoses:   1. Foraminal stenosis of lumbar region  Follow Up In Physical Therapy      2. Adjacent segment disease of lumbar spine with history of fusion procedure  Follow Up In Physical Therapy        General:  Reason for visit: Lumbar radiculopathy   Referred by: Dr Billy Beal MD appt:  9/30/2024  Preferred Name:  Ludwin  Script:  PT Eval and treat  Onset Date:  6/5/2024    Assessment:  Progressed patient with addition of current this date to LE strengthening exercises.  Able to tolerate increased resistance and maintain balance with ex.  Improved tolerance to   Patient needs continued work on/skilled PT for: exercises to strengthening core and work on lumbar mobility without exacerbating her symptoms to address remaining functional, objective and subjective deficits to allow them to return to prior /optimal level of function with ADLs.  Patient is progressing with goals: mobility, strength, endurance, flexibility  Skilled care:  Aquatic exercise progression, patient education    Plan:    Continue to progress per poc:   Add retro amb as able.  Add hamstring curl against the current as able.     Subjective:   Patient reports: she still feels like there is a little elf running around in her R. Quad, causing sharp pain to cease walking or task she is performing at the time.  Patient will be on a cruise for two weeks after today's visit.  Plans on walking in water and performing aquatic HEP.  Patient declined printouts for aquatic ex, stating she remembers them.     Have you fallen since last visit:  no    Precautions:  "low fall risk  HPI: 3/3/2021, patient had lumbar fusion L4-S1, now patient is experiencing pain related to L3-4 including stenosis as identified on recent MRI. Pt has had 1 injection which didn't help.     Pain:  6/10  Location/Type of pain:  Aching lilian buttock and right anterior thigh, increases to sharp with standing ex in pool    HEP compliance/understanding:  yes    Objective:   Objective Measurements:    Gait:  Improved tolerance to TM ambulation without pain until 5'  Posture:  Improved posture with TM amb this date.       Treatment:   **= HEP  NV= Next visit  np= not performed  nb= non-billable  G= group HEP= discharged to HEP  **FLOATER** donned 3# BLE weight**     Aquatics:          45 minutes  Hamstring/Hip Flexor stair stretchs 30\" x 2 lilian, each  Lateral amb x 3 laps  TM fwd amb T=0, C=0 x 5'    @TM bars  C=8 HR 2 x 10, facing C  C=8 Mini squat x10 facing C, x 10 facing away from C  C=8 3 Way Hip x 10 lilian, each alternating, kicking against C each direction  C=8 Hip circles cw/ccwx 10 lilian, each  C=8 Marching 2 X 10, facing C  C=8 Ant/post weight shifts x 10 each LE forward, facing C    BTW:  Can Cans x 10 lilian, alternating      Education:  V/c and demonstration by therapist on deck for correct technique and posture with aquatic exercises.   HEP Progression:  n/a    "

## 2024-08-23 ENCOUNTER — OFFICE VISIT (OUTPATIENT)
Dept: PRIMARY CARE | Facility: CLINIC | Age: 74
End: 2024-08-23
Payer: MEDICARE

## 2024-08-23 VITALS
BODY MASS INDEX: 39.01 KG/M2 | DIASTOLIC BLOOD PRESSURE: 74 MMHG | SYSTOLIC BLOOD PRESSURE: 132 MMHG | HEIGHT: 62 IN | WEIGHT: 212 LBS

## 2024-08-23 DIAGNOSIS — M48.00 MULTILEVEL FORAMINAL STENOSIS: Primary | ICD-10-CM

## 2024-08-23 PROCEDURE — 3078F DIAST BP <80 MM HG: CPT | Performed by: STUDENT IN AN ORGANIZED HEALTH CARE EDUCATION/TRAINING PROGRAM

## 2024-08-23 PROCEDURE — 3008F BODY MASS INDEX DOCD: CPT | Performed by: STUDENT IN AN ORGANIZED HEALTH CARE EDUCATION/TRAINING PROGRAM

## 2024-08-23 PROCEDURE — 3075F SYST BP GE 130 - 139MM HG: CPT | Performed by: STUDENT IN AN ORGANIZED HEALTH CARE EDUCATION/TRAINING PROGRAM

## 2024-08-23 PROCEDURE — G2211 COMPLEX E/M VISIT ADD ON: HCPCS | Performed by: STUDENT IN AN ORGANIZED HEALTH CARE EDUCATION/TRAINING PROGRAM

## 2024-08-23 PROCEDURE — 99214 OFFICE O/P EST MOD 30 MIN: CPT | Performed by: STUDENT IN AN ORGANIZED HEALTH CARE EDUCATION/TRAINING PROGRAM

## 2024-08-23 RX ORDER — TRAMADOL HYDROCHLORIDE 50 MG/1
50 TABLET ORAL 2 TIMES DAILY PRN
Qty: 42 TABLET | Refills: 0 | Status: SHIPPED | OUTPATIENT
Start: 2024-08-23 | End: 2024-09-13

## 2024-08-23 NOTE — PROGRESS NOTES
"Subjective   Patient ID: Ludwin Walsh is a 73 y.o. female who presents for Hip Pain (Right hip/leg discomfort, x 2 months).    HPI     Presents for follow-up. Activity is severly limited by right lower back and right leg pain, numbness, tingling. Not getting much sleep. Lots of help to change position. Has been receiving injections with pain management. Appointment with neurosurgery upcoming. Has scheduled cruise for 55 year weddings anniversary and would like to participate in some of the activities     Review of Systems    8 point review of systems is otherwise negative unless mentioned on HPI      Objective   /74   Ht 1.575 m (5' 2\")   Wt 96.2 kg (212 lb)   BMI 38.78 kg/m²     Physical Exam  Constitutional:       Appearance: Normal appearance.   Eyes:      Extraocular Movements: Extraocular movements intact.   Cardiovascular:      Rate and Rhythm: Regular rhythm.      Heart sounds: Normal heart sounds.   Pulmonary:      Breath sounds: Normal breath sounds.   Musculoskeletal:         General: No swelling.      Cervical back: Neck supple.   Neurological:      Mental Status: Mental status is at baseline.   Psychiatric:         Mood and Affect: Mood normal.             Assessment/Plan       Back pain  -Following with spine surgery and pain management  -Steroids bursts have not been helpful  -Had side effects from gabapentin and stopped taking  -Has been receiving injections  -Has followup scheduled with spine surgery  -[facility] report review, prescribed brief course of tramadol 50mg BID during upcoming travel     Hypertension  -Continue lisinopril/hydrochlorothiazide 10/12.5 mg daily     Dyslipidemia  -Continue atorvastatin 40 mg daily  -Coronary artery calcium score of 190 in 8/2023     Depression  -After previous visit had decreased Lexapro to 10 mg daily, had return of symptoms and has since resumed 20 mg daily     Hand pain/thumb pain  -Has been on meloxicam as needed  -Has seen ortho-hand and received " steroid injections     Healthcare maintenance  -Colonoscopy 2020, repeat interval of 10 years, had not been recommended for any further colonoscopies  -Mammogram 2/2024  -Nystatin powder for rash underneath breasts and abdomen     Follow-up visit already scheduled      This note was dictated by speech recognition. Minor errors in transcription may be present.

## 2024-08-26 ENCOUNTER — APPOINTMENT (OUTPATIENT)
Dept: PHYSICAL THERAPY | Facility: CLINIC | Age: 74
End: 2024-08-26
Payer: MEDICARE

## 2024-09-10 ENCOUNTER — APPOINTMENT (OUTPATIENT)
Dept: PHYSICAL THERAPY | Facility: CLINIC | Age: 74
End: 2024-09-10
Payer: MEDICARE

## 2024-09-23 ENCOUNTER — APPOINTMENT (OUTPATIENT)
Dept: PAIN MEDICINE | Facility: CLINIC | Age: 74
End: 2024-09-23
Payer: MEDICARE

## 2024-09-23 VITALS
RESPIRATION RATE: 18 BRPM | WEIGHT: 208 LBS | OXYGEN SATURATION: 97 % | SYSTOLIC BLOOD PRESSURE: 128 MMHG | BODY MASS INDEX: 38.28 KG/M2 | TEMPERATURE: 97.7 F | HEART RATE: 70 BPM | HEIGHT: 62 IN | DIASTOLIC BLOOD PRESSURE: 71 MMHG

## 2024-09-23 DIAGNOSIS — M96.1 POSTLAMINECTOMY SYNDROME OF LUMBAR REGION: ICD-10-CM

## 2024-09-23 DIAGNOSIS — Z79.891 OPIATE ANALGESIC CONTRACT EXISTS: Primary | ICD-10-CM

## 2024-09-23 DIAGNOSIS — M54.16 LUMBAR RADICULOPATHY: ICD-10-CM

## 2024-09-23 LAB
AMPHETAMINES UR QL SCN: NORMAL
BARBITURATES UR QL SCN: NORMAL
BZE UR QL SCN: NORMAL
CANNABINOIDS UR QL SCN: NORMAL
CREAT UR-MCNC: 153.4 MG/DL (ref 20–320)
PCP UR QL SCN: NORMAL

## 2024-09-23 PROCEDURE — 1036F TOBACCO NON-USER: CPT | Performed by: ANESTHESIOLOGY

## 2024-09-23 PROCEDURE — 3078F DIAST BP <80 MM HG: CPT | Performed by: ANESTHESIOLOGY

## 2024-09-23 PROCEDURE — 3008F BODY MASS INDEX DOCD: CPT | Performed by: ANESTHESIOLOGY

## 2024-09-23 PROCEDURE — 99214 OFFICE O/P EST MOD 30 MIN: CPT | Performed by: ANESTHESIOLOGY

## 2024-09-23 PROCEDURE — 1125F AMNT PAIN NOTED PAIN PRSNT: CPT | Performed by: ANESTHESIOLOGY

## 2024-09-23 PROCEDURE — 1159F MED LIST DOCD IN RCRD: CPT | Performed by: ANESTHESIOLOGY

## 2024-09-23 PROCEDURE — 80346 BENZODIAZEPINES1-12: CPT | Performed by: ANESTHESIOLOGY

## 2024-09-23 PROCEDURE — 82570 ASSAY OF URINE CREATININE: CPT | Mod: 59 | Performed by: ANESTHESIOLOGY

## 2024-09-23 PROCEDURE — 3074F SYST BP LT 130 MM HG: CPT | Performed by: ANESTHESIOLOGY

## 2024-09-23 RX ORDER — NALOXONE HYDROCHLORIDE 4 MG/.1ML
1 SPRAY NASAL AS NEEDED
Qty: 2 EACH | Refills: 0 | Status: SHIPPED | OUTPATIENT
Start: 2024-09-23

## 2024-09-23 RX ORDER — TRAMADOL HYDROCHLORIDE 50 MG/1
50 TABLET ORAL 2 TIMES DAILY PRN
Qty: 60 TABLET | Refills: 0 | Status: SHIPPED | OUTPATIENT
Start: 2024-09-23 | End: 2024-10-23

## 2024-09-23 ASSESSMENT — COLUMBIA-SUICIDE SEVERITY RATING SCALE - C-SSRS
6. HAVE YOU EVER DONE ANYTHING, STARTED TO DO ANYTHING, OR PREPARED TO DO ANYTHING TO END YOUR LIFE?: NO
1. IN THE PAST MONTH, HAVE YOU WISHED YOU WERE DEAD OR WISHED YOU COULD GO TO SLEEP AND NOT WAKE UP?: NO
2. HAVE YOU ACTUALLY HAD ANY THOUGHTS OF KILLING YOURSELF?: NO

## 2024-09-23 ASSESSMENT — PATIENT HEALTH QUESTIONNAIRE - PHQ9
2. FEELING DOWN, DEPRESSED OR HOPELESS: NOT AT ALL
SUM OF ALL RESPONSES TO PHQ9 QUESTIONS 1 AND 2: 0
1. LITTLE INTEREST OR PLEASURE IN DOING THINGS: NOT AT ALL

## 2024-09-23 ASSESSMENT — ENCOUNTER SYMPTOMS
LOSS OF SENSATION IN FEET: 0
BACK PAIN: 1
ADENOPATHY: 0
SHORTNESS OF BREATH: 0
DEPRESSION: 0
WEAKNESS: 0
BLOOD IN STOOL: 0
OCCASIONAL FEELINGS OF UNSTEADINESS: 0
DIFFICULTY URINATING: 0
EYE REDNESS: 0
FEVER: 0

## 2024-09-23 ASSESSMENT — PAIN SCALES - GENERAL
PAINLEVEL_OUTOF10: 8
PAINLEVEL: 8

## 2024-09-23 ASSESSMENT — PAIN DESCRIPTION - DESCRIPTORS: DESCRIPTORS: ACHING;BURNING

## 2024-09-23 ASSESSMENT — PAIN - FUNCTIONAL ASSESSMENT: PAIN_FUNCTIONAL_ASSESSMENT: 0-10

## 2024-09-23 NOTE — PROGRESS NOTES
"  Chief Complain  Follow-up (For [place] transforaminal on 8/9 with 40% relief, that lasted for like 4 weeks./Pain is in right hip and upper thigh)       History Of Present Illness  Loretta Walsh \"Ramsey" is a 73 y.o. female here for chronic low back pain radiating to the right  posterior thigh. The patient rates the pain at 8  on a scale from 0-10.  The patient describes pain as aching and tingling.  The pain is worsened by standing, walking, and going up stairs and is alleviated by sitting and lying down.  Since the last visit the pain has stayed the same.  The patient denies any fever, chills, weight loss, bladder/bowel incontinence.     Previous procedures  Lumbar transforaminal epidural steroid injection at right L2-3, L3-4 on 8/9/2024: 40% pain relief lasting for 3-4 weeks     Past Medical History  She has a past medical history of Anxiety disorder, unspecified, Asymptomatic menopausal state (02/05/2021), Benign paroxysmal vertigo, unspecified ear (06/21/2022), Chronic kidney disease, stage 3a (Multi) (12/20/2022), Major depressive disorder, single episode, in full remission (CMS-[facility]) (12/20/2022), Morbid (severe) obesity due to excess calories (Multi) (12/20/2022), Other specified disorders of bone density and structure, unspecified site (12/20/2022), Personal history of cervical dysplasia, Personal history of other diseases of the digestive system, Personal history of other diseases of the musculoskeletal system and connective tissue (02/14/2014), Personal history of other diseases of the musculoskeletal system and connective tissue (12/01/2020), Personal history of other diseases of the musculoskeletal system and connective tissue, Personal history of other diseases of the nervous system and sense organs, Personal history of other diseases of the nervous system and sense organs, Personal history of other diseases of the respiratory system (12/01/2020), Personal history of other endocrine, nutritional and " metabolic disease, Personal history of other medical treatment (2021), and Spinal stenosis, site unspecified (2022).    Surgical History  She has a past surgical history that includes Total knee arthroplasty (2014); Other surgical history (10/05/2020); Other surgical history (10/05/2020); Other surgical history (10/05/2020); Total knee arthroplasty (2018); Carpal tunnel release (2018);  section, classic (2018); Eye surgery (2018); and Lumbar fusion.     Social History  She reports that she has never smoked. She has never used smokeless tobacco. She reports current alcohol use. She reports that she does not use drugs.    Family History  Family History   Problem Relation Name Age of Onset    Diabetes Father          Allergies  Patient has no known allergies.    Review of Systems  Review of Systems   Constitutional:  Negative for fever.   HENT:  Negative for ear pain.    Eyes:  Negative for redness.   Respiratory:  Negative for shortness of breath.    Cardiovascular:  Negative for chest pain.   Gastrointestinal:  Negative for blood in stool.   Endocrine: Negative for cold intolerance and heat intolerance.   Genitourinary:  Negative for difficulty urinating.   Musculoskeletal:  Positive for back pain.   Skin:  Negative for rash.   Allergic/Immunologic: Negative for food allergies.   Neurological:  Negative for weakness.   Hematological:  Negative for adenopathy.   Psychiatric/Behavioral:  Negative for suicidal ideas.         Physical Exam  Physical Exam  Constitutional:       Appearance: Normal appearance.   HENT:      Head: Normocephalic and atraumatic.   Eyes:      Extraocular Movements: Extraocular movements intact.      Pupils: Pupils are equal, round, and reactive to light.   Cardiovascular:      Rate and Rhythm: Normal rate.   Pulmonary:      Effort: Pulmonary effort is normal.   Abdominal:      Palpations: Abdomen is soft.   Musculoskeletal:      Cervical back:  "Neck supple.   Skin:     General: Skin is warm.   Neurological:      Mental Status: She is alert and oriented to person, place, and time.      Motor: Motor function is intact.      Gait: Gait is intact.      Deep Tendon Reflexes:      Reflex Scores:       Patellar reflexes are 2+ on the right side and 2+ on the left side.       Achilles reflexes are 0 on the right side and 0 on the left side.       Labs Review:      Latest Reference Range & Units 12/19/23 11:59   GLUCOSE 74 - 99 mg/dL 88   SODIUM 136 - 145 mmol/L 134 (L)   POTASSIUM 3.5 - 5.3 mmol/L 4.5   CHLORIDE 98 - 107 mmol/L 98   Bicarbonate 21 - 32 mmol/L 28   Anion Gap 10 - 20 mmol/L 13   Blood Urea Nitrogen 6 - 23 mg/dL 24 (H)   Creatinine 0.50 - 1.05 mg/dL 1.16 (H)   EGFR >60 mL/min/1.73m*2 50 (L)   Calcium 8.6 - 10.6 mg/dL 10.0   Albumin 3.4 - 5.0 g/dL 4.5   Alkaline Phosphatase 33 - 136 U/L 96   ALT 7 - 45 U/L 19   AST 9 - 39 U/L 20   Bilirubin Total 0.0 - 1.2 mg/dL 0.9   HDL CHOLESTEROL mg/dL 52.3   Cholesterol/HDL Ratio  3.4   LDL Calculated <=99 mg/dL 100 (H)   VLDL 0 - 40 mg/dL 26   TRIGLYCERIDES 0 - 149 mg/dL 130   Non HDL Cholesterol 0 - 149 mg/dL 126   (L): Data is abnormally low  (H): Data is abnormally high     Latest Reference Range & Units 12/19/23 11:59   WBC 4.4 - 11.3 x10*3/uL 7.1   nRBC 0.0 - 0.0 /100 WBCs 0.0   RBC 4.00 - 5.20 x10*6/uL 4.11   HEMOGLOBIN 12.0 - 16.0 g/dL 12.6   HEMATOCRIT 36.0 - 46.0 % 39.2   MCV 80 - 100 fL 95   MCH 26.0 - 34.0 pg 30.7   MCHC 32.0 - 36.0 g/dL 32.1   RED CELL DISTRIBUTION WIDTH 11.5 - 14.5 % 12.9   Platelets 150 - 450 x10*3/uL 302     Last Recorded Vitals  Blood pressure 128/71, pulse 70, temperature 36.5 °C (97.7 °F), resp. rate 18, height 1.575 m (5' 2\"), weight 94.3 kg (208 lb), SpO2 97%.       Assessment/Plan     Loretta Walsh \"Jan\" is a 73 y.o. female here for follow-up of low back pain, right posterior thigh she is status post L4-S1 fusion which resolved her symptoms for several years.  Now her pain " has started to come back, review of her MRI does reveal adjacent segment disease with neuroforaminal stenosis at L2-3, L3-4.  Since last visit she has finished course of water therapy without any significant benefit.  We did do right L2-3, L3-4 transforaminal epidural steroid injection provided her with 40% relief of pain lasting for 3 to 4 weeks or so.  He denies new neurological, consistent symptoms.  Currently managing her pain with Tylenol with limited benefit.  Given a short prescription of tramadol for her cruise which did provide her with significant relief of pain.  She denies any significant side effects.  Commend that she continues follow-up with Dr. Jaime for consideration of surgical interventions if indicated.  If she is not considered a surgical candidate would consider interlaminar approach to see if he can get longer relief.  Prescription for tramadol was provided to the patient for symptomatic relief of pain.  Discussed risk associated with narcotics including but not limited to addiction, dependence, respiratory depression, death, mental health issues, hormonal changes.  I have personally reviewed the OARRS report. This report is scanned into the electronic medical record. I have considered the risks of abuse, dependence, addiction and diversion.  opiate agreement was signed by the patient.      Lan Grimes MD

## 2024-09-26 LAB
1OH-MIDAZOLAM UR CFM-MCNC: <25 NG/ML
6MAM UR CFM-MCNC: <25 NG/ML
7AMINOCLONAZEPAM UR CFM-MCNC: <25 NG/ML
A-OH ALPRAZ UR CFM-MCNC: <25 NG/ML
ALPRAZ UR CFM-MCNC: <25 NG/ML
CHLORDIAZEP UR CFM-MCNC: <25 NG/ML
CLONAZEPAM UR CFM-MCNC: <25 NG/ML
CODEINE UR CFM-MCNC: <50 NG/ML
DIAZEPAM UR CFM-MCNC: <25 NG/ML
EDDP UR CFM-MCNC: <25 NG/ML
FENTANYL UR CFM-MCNC: <2.5 NG/ML
HYDROCODONE CTO UR CFM-MCNC: <25 NG/ML
HYDROMORPHONE UR CFM-MCNC: <25 NG/ML
LORAZEPAM UR CFM-MCNC: <25 NG/ML
METHADONE UR CFM-MCNC: <25 NG/ML
MIDAZOLAM UR CFM-MCNC: <25 NG/ML
MORPHINE UR CFM-MCNC: <50 NG/ML
NORDIAZEPAM UR CFM-MCNC: <25 NG/ML
NORFENTANYL UR CFM-MCNC: <2.5 NG/ML
NORHYDROCODONE UR CFM-MCNC: <25 NG/ML
NOROXYCODONE UR CFM-MCNC: <25 NG/ML
NORTRAMADOL UR-MCNC: >1000 NG/ML
OXAZEPAM UR CFM-MCNC: 39 NG/ML
OXYCODONE UR CFM-MCNC: <25 NG/ML
OXYMORPHONE UR CFM-MCNC: <25 NG/ML
TEMAZEPAM UR CFM-MCNC: <25 NG/ML
TRAMADOL UR CFM-MCNC: >1000 NG/ML
ZOLPIDEM UR CFM-MCNC: <25 NG/ML
ZOLPIDEM UR-MCNC: <25 NG/ML

## 2024-09-30 ENCOUNTER — HOSPITAL ENCOUNTER (OUTPATIENT)
Dept: RADIOLOGY | Facility: CLINIC | Age: 74
Discharge: HOME | End: 2024-09-30
Payer: MEDICARE

## 2024-09-30 ENCOUNTER — APPOINTMENT (OUTPATIENT)
Dept: NEUROSURGERY | Facility: CLINIC | Age: 74
End: 2024-09-30
Payer: MEDICARE

## 2024-09-30 VITALS
BODY MASS INDEX: 38.28 KG/M2 | DIASTOLIC BLOOD PRESSURE: 82 MMHG | SYSTOLIC BLOOD PRESSURE: 142 MMHG | TEMPERATURE: 97.3 F | HEIGHT: 62 IN | HEART RATE: 61 BPM | WEIGHT: 208 LBS

## 2024-09-30 DIAGNOSIS — M48.061 FORAMINAL STENOSIS OF LUMBAR REGION: Primary | ICD-10-CM

## 2024-09-30 DIAGNOSIS — M48.061 FORAMINAL STENOSIS OF LUMBAR REGION: ICD-10-CM

## 2024-09-30 PROCEDURE — 72110 X-RAY EXAM L-2 SPINE 4/>VWS: CPT | Performed by: RADIOLOGY

## 2024-09-30 PROCEDURE — 72120 X-RAY BEND ONLY L-S SPINE: CPT

## 2024-09-30 ASSESSMENT — PAIN SCALES - GENERAL: PAINLEVEL: 8

## 2024-09-30 NOTE — PROGRESS NOTES
[facility]  Department of Neurological Surgery  Established Patient Visit    History of Present Illness:  Loretta Walsh is a 74 y.o. year old female who presents to the spine clinic in follow up with low back pain with radiation into her legs with weakness worse on the right side than the left. Symptoms began around 6-7 months ago. History of a fall in early June due to legs giving out. She has had 1 epidural steroid injection with Dr. Grimes without relief, then tried a second injection in July. Tried additional PT for core strengthening in July. Patient has recently had an MRI and a CAT scan and plain x-rays of her lumbar spine. It appears that she has segmental disease with severe foraminal stenosis bilaterally at L3-4 and L2-3. On the CAT scan she also has degenerative changes at L1-2. We can see that she has a solid fusion from L4 to the sacrum. Prior history of spinal fusion.    Patient's BMI is Body mass index is 38.04 kg/m².    Diabetic: no     Osteoporosis: no  No DXA results found for the past 12 months    Review of Systems:  14/14 systems reviewed and negative other than what is listed in the history of present illness    Patient Active Problem List   Diagnosis    Arthritis of carpometacarpal (CMC) joint of left thumb    ASCVD (arteriosclerotic cardiovascular disease)    Benign essential hypertension    BPV (benign positional vertigo)    CKD stage G3a/A1, GFR 45-59 and albumin creatinine ratio <30 mg/g (Multi)    Colon polyps    Depression, major, in remission (CMS-HCC)    GERD (gastroesophageal reflux disease)    Hyperlipidemia    Hypertension    Insomnia, idiopathic    Multilevel foraminal stenosis    Osteoarthritis of spine with radiculopathy, lumbar region    Osteopenia    Peripheral neuropathy    Plantar fasciitis of right foot    Medicare annual wellness visit, subsequent    Foraminal stenosis of lumbar region    Adjacent segment disease of lumbar spine with history of  fusion procedure    Body mass index (BMI) 40.0-44.9, adult (Multi)     Past Medical History:   Diagnosis Date    Anxiety disorder, unspecified     Anxiety    Asymptomatic menopausal state 02/05/2021    Postmenopausal    Benign paroxysmal vertigo, unspecified ear 06/21/2022    BPV (benign positional vertigo)    Chronic kidney disease, stage 3a (Multi) 12/20/2022    CKD stage G3a/A1, GFR 45-59 and albumin creatinine ratio <30 mg/g    Major depressive disorder, single episode, in full remission (CMS-HCC) 12/20/2022    Depression, major, in remission    Morbid (severe) obesity due to excess calories (Multi) 12/20/2022    Class 2 severe obesity with serious comorbidity and body mass index (BMI) of 38.0 to 38.9 in adult    Other specified disorders of bone density and structure, unspecified site 12/20/2022    Osteopenia    Personal history of cervical dysplasia     History of cervical dysplasia    Personal history of other diseases of the digestive system     History of gastroesophageal reflux (GERD)    Personal history of other diseases of the musculoskeletal system and connective tissue 02/14/2014    History of osteoarthritis    Personal history of other diseases of the musculoskeletal system and connective tissue 12/01/2020    History of arthritis    Personal history of other diseases of the musculoskeletal system and connective tissue     History of arthritis    Personal history of other diseases of the nervous system and sense organs     History of neuropathy    Personal history of other diseases of the nervous system and sense organs     History of glaucoma    Personal history of other diseases of the respiratory system 12/01/2020    History of acute sinusitis    Personal history of other endocrine, nutritional and metabolic disease     History of hyperlipidemia    Personal history of other medical treatment 01/06/2021    History of screening mammography    Spinal stenosis, site unspecified 12/20/2022    Multilevel  foraminal stenosis     Past Surgical History:   Procedure Laterality Date    CARPAL TUNNEL RELEASE  2018    Neuroplasty Decompression Median Nerve At Carpal Tunnel     SECTION, CLASSIC  2018     Section    EYE SURGERY  2018    Eye Surgery    LUMBAR FUSION      OTHER SURGICAL HISTORY  10/05/2020    Loop electrosurgical excision procedure    OTHER SURGICAL HISTORY  10/05/2020    Hysteroscopic uterine polypectomy    OTHER SURGICAL HISTORY  10/05/2020    Dilation and curettage    TOTAL KNEE ARTHROPLASTY  2014    Knee Replacement    TOTAL KNEE ARTHROPLASTY  2018    Knee Replacement     Social History     Tobacco Use    Smoking status: Never    Smokeless tobacco: Never   Substance Use Topics    Alcohol use: Yes     Comment: Rarely     family history includes Diabetes in her father.    Current Outpatient Medications:     ascorbic acid (Vitamin C) 500 mg tablet, Take 1 tablet (500 mg) by mouth once daily., Disp: , Rfl:     atorvastatin (Lipitor) 40 mg tablet, Take 1 tablet (40 mg) by mouth once daily., Disp: 90 tablet, Rfl: 1    cholecalciferol (Vitamin D-3) 50 mcg (2,000 unit) capsule, Take by mouth., Disp: , Rfl:     escitalopram (Lexapro) 20 mg tablet, Take 1 tablet (20 mg) by mouth once daily., Disp: 90 tablet, Rfl: 1    lisinopriL-hydrochlorothiazide 10-12.5 mg tablet, Take 1 tablet by mouth once daily., Disp: 90 tablet, Rfl: 1    meloxicam (Mobic) 15 mg tablet, Take 1 tablet (15 mg) by mouth once daily., Disp: 90 tablet, Rfl: 1    multivitamin tablet, Take 1 tablet by mouth once daily., Disp: , Rfl:     nystatin (Mycostatin) 100,000 unit/gram powder, Apply 1 Application topically 2 times a day., Disp: 60 g, Rfl: 2    omeprazole (PriLOSEC) 20 mg DR capsule, Take 1 capsule (20 mg) by mouth 2 times a day., Disp: 180 capsule, Rfl: 1    traMADol (Ultram) 50 mg tablet, Take 1 tablet (50 mg) by mouth 2 times a day as needed for moderate pain (4 - 6)., Disp: 60 tablet, Rfl: 0     diazePAM (Valium) 5 mg tablet, Take 1 tablet (5 mg) by mouth 1 time for 1 dose. 30 min before scheduled procedure, Disp: 1 tablet, Rfl: 0    gabapentin (Neurontin) 300 mg capsule, Take 1 capsule (300 mg) by mouth once daily at bedtime., Disp: 90 capsule, Rfl: 1    naloxone (Narcan) 4 mg/0.1 mL nasal spray, Administer 1 spray (4 mg) into affected nostril(s) if needed for opioid reversal. May repeat every 2-3 minutes if needed, alternating nostrils, until medical assistance becomes available. (Patient not taking: Reported on 9/30/2024), Disp: 2 each, Rfl: 0  No Known Allergies    Physical Examination:    General: Well developed, awake/alert/oriented x3, no distress, alert and cooperative  Skin: Warm and dry, no lesions, no rashes  ENMT: Mucous membranes moist, no apparent injury, no lesions seen  Head/Neck: Neck Supple, no apparent injury  Respiratory/Thorax: Normal breath sounds with good chest expansion, thorax symmetric  Cardiovascular: No pitting edema, no JVD    Motor Strength: 5/5 Throughout all extremities    Muscle Bulk: Normal and symmetric in all extremities    Posture:   -- Cervical: Normal  -- Thoracic: Normal  -- Lumbar : Normal  Paraspinal muscle spasm/tenderness absent.     Sensation: intact to light touch    Lumbar radiculopathy in the legs, R>L, in the L3 and L4 distribution  severe back pain    Results:  I personally reviewed and interpreted the imaging results which included the 3 studies that I described above which included the plain x-rays, CAT scan, and MRI of her lumbar spine. Adjacent segment disease at L2-3, L3-4.    Assessment and Plan:      Loretta Walsh is a 74 y.o. year old female who presents to the spine clinic in follow up with segmental disease of the lumbar spine after her fusion with significant foraminal stenosis at the 2 proximal levels. She has low back pain with radiation into her legs with weakness worse on the right side than the left. Symptoms began around 6-7 months ago.  History of a fall in early June due to legs giving out. She has had 1 epidural steroid injection with Dr. Grimes without relief, then tried a second injection in July. Tried additional PT for core strengthening in July. Patient has recently had an MRI and a CAT scan and plain x-rays of her lumbar spine. It appears that she has segmental disease with severe foraminal stenosis bilaterally at L3-4 and L2-3. On the CAT scan she also has degenerative changes at L1-2. We can see that she has a solid fusion from L4 to the sacrum. Prior history of spinal fusion.    I have reviewed imaging and diagnosis with the patient, discussed the natural history of their disease and both non-operative and operative treatments available and rationale vs risks for both.    The patient’s clinical symptoms correlates well with the radiological findings. Patient has been having significant functional impairment with decreased ability to perform her normal activities of daily living. They have tried treatment options including medications (NSAIDs/narcotics/muscle relaxants/membrane stabilizers), formal physical therapy, and injections.    I offered the option of surgery that would consist of a L2-3, L3-4 DLIF with L2-S1 extension revision of hardware.    While there is no evidence of instability in the form of pars defect or spondylolisthesis or prior discectomies; because of the presence of foraminal and extraforaminal severe stenosis, extensive decompression with removal of almost the entire facet in the form of complete facetectomy /resection of pars interarticularis or more than 75% of the facet will have to be performed at the operative levels that will result in destabilization of the spine/intraoperative spinal instability and hence would require concomitant stabilization by placement of pedicle screws. I believe that not performing a fusion and instrumentation following the extensive decompression will be a suboptimal treatment and  leave the spine destabilized with potential worsening of her symptoms and development of spinal deformity that may require a much bigger revision surgery that currently planned.    I have explained the surgical procedure in detail with expected duration and extent of recovery along risks of surgery that include, but is not limited to bleeding, infection, blood vessel injury or damage, loss of sensation, loss of bladder, bowel or sexual function, nerve injury/damage resulting in weakness/paralysis, malunion, nonunion, CSF leak, brachial plexus injury, peripheral vision blindness, failure of implants/fusion, failure to relieve symptoms, recurrent disease, adjacent segment disease, need to reoperate for any reason and general anesthesia reaction such as stroke, coma, heart attack, delirium, confusion, death as well as worsening of preexisted medical conditions.    All questions were answered and the patient left satisfied with the surgical plan moving forward.      I have reviewed all prior documentation and reviewed the electronic medical record since admission. I have personally have reviewed all advanced imaging not just the reports and used my interpretation as documented as the relevant findings. I have reviewed the risks and benefits of all treatment recommendations listed in this note with the patient and family. I spent a total of 60 minutes in service to this patient's care during this date of service.      The above clinical summary has been dictated with voice recognition software. It has not been proofread for grammatical errors, typographical mistakes, or other semantic inconsistencies.    Thank you for visiting our office today. It was our pleasure to take part in your healthcare.     Do not hesitate to call with any questions regarding your plan of care after leaving at [phone] M-F 8am-4pm.     To clinicians, thank you very much for this kind referral. It is a privilege to partner with you in the  care of your patients. My office would be delighted to assist you with any further consultations or with questions regarding the plan of care outlined. Do not hesitate to call the office or contact me directly.       Sincerely,      Dameon Jaime MD, [facility]  Spine , [facility]  Filemon Murphy Chair in Spinal Neurosurgery  Complex Spine Surgery Fellowship Director   of Neurological Surgery  [facility] [employer]  Phone: [phone]  Fax: [phone]        Scribe Attestation  By signing my name below, I, Iraida Fariha Rios   attest that this documentation has been prepared under the direction and in the presence of Dameon Jamie MD.

## 2024-10-01 DIAGNOSIS — R79.1 ABNORMAL COAGULATION PROFILE: ICD-10-CM

## 2024-10-01 DIAGNOSIS — M48.061 SPINAL STENOSIS OF LUMBAR REGION WITHOUT NEUROGENIC CLAUDICATION: Primary | ICD-10-CM

## 2024-10-01 RX ORDER — ACETAMINOPHEN 325 MG/1
975 TABLET ORAL ONCE
OUTPATIENT
Start: 2024-10-01 | End: 2024-10-01

## 2024-10-01 RX ORDER — CELECOXIB 400 MG/1
400 CAPSULE ORAL ONCE
OUTPATIENT
Start: 2024-10-01 | End: 2024-10-01

## 2024-10-01 RX ORDER — TRANEXAMIC ACID 650 MG/1
1300 TABLET ORAL ONCE
OUTPATIENT
Start: 2024-10-01 | End: 2024-10-01

## 2024-10-01 RX ORDER — SODIUM CHLORIDE, SODIUM LACTATE, POTASSIUM CHLORIDE, CALCIUM CHLORIDE 600; 310; 30; 20 MG/100ML; MG/100ML; MG/100ML; MG/100ML
20 INJECTION, SOLUTION INTRAVENOUS CONTINUOUS
OUTPATIENT
Start: 2024-10-01

## 2024-10-09 ENCOUNTER — CLINICAL SUPPORT (OUTPATIENT)
Dept: PREADMISSION TESTING | Facility: HOSPITAL | Age: 74
End: 2024-10-09
Payer: MEDICARE

## 2024-10-09 NOTE — CPM/PAT NURSE NOTE
"CPM/PAT Nurse Note      Name: Loretta Walsh (Loretta Walsh \"Ludwin\")  /Age: 1950/74 y.o.     Loretta Walsh is scheduled for L2-L3, L3-L4 Extreme Lateral Interbody Fusion L2-S1 Extension Revision of Hardware on 10/29/24    **Data Input  Past Medical History:   Diagnosis Date    Anxiety disorder, unspecified     Anxiety    Arthritis     Benign paroxysmal vertigo, unspecified ear 2022    BPV (benign positional vertigo)    Chronic kidney disease, stage 3a (Multi) 2022    CKD stage G3a/A1, GFR 45-59 and albumin creatinine ratio <30 mg/g    GERD (gastroesophageal reflux disease)     Hypertension     Major depressive disorder, single episode, in full remission (CMS-[facility]) 2022    Depression, major, in remission    Morbid (severe) obesity due to excess calories (Multi) 2022    Class 2 severe obesity with serious comorbidity and body mass index (BMI) of 38.0 to 38.9 in adult    Other specified disorders of bone density and structure, unspecified site 2022    Osteopenia    Personal history of cervical dysplasia     History of cervical dysplasia    Personal history of other diseases of the nervous system and sense organs     History of neuropathy    Personal history of other diseases of the nervous system and sense organs     History of glaucoma    Spinal stenosis, site unspecified 2022    Multilevel foraminal stenosis       Past Surgical History:   Procedure Laterality Date    CARPAL TUNNEL RELEASE  2018    Neuroplasty Decompression Median Nerve At Carpal Tunnel     SECTION, LOW TRANSVERSE      DILATION AND CURETTAGE OF UTERUS      EYE SURGERY  2018    Eye Surgery    HYSTEROSCOPY      KNEE ARTHROPLASTY      LUMBAR FUSION      OTHER SURGICAL HISTORY  10/05/2020    Loop electrosurgical excision procedure       Patient  has no history on file for sexual activity.    Family History   Problem Relation Name Age of Onset    Diabetes Father         No Known " Allergies    Prior to Admission medications    Medication Sig Start Date End Date Taking? Authorizing Provider   ascorbic acid (Vitamin C) 500 mg tablet Take 1 tablet (500 mg) by mouth once daily.    Historical Provider, MD   atorvastatin (Lipitor) 40 mg tablet Take 1 tablet (40 mg) by mouth once daily. 6/19/24   Topher Blackwood MD   cholecalciferol (Vitamin D-3) 50 mcg (2,000 unit) capsule Take by mouth. 12/1/20   Historical Provider, MD   diazePAM (Valium) 5 mg tablet Take 1 tablet (5 mg) by mouth 1 time for 1 dose. 30 min before scheduled procedure 8/4/24 8/4/24  Lan Grimes MD   escitalopram (Lexapro) 20 mg tablet Take 1 tablet (20 mg) by mouth once daily. 6/19/24   Topher Blackwood MD   gabapentin (Neurontin) 300 mg capsule Take 1 capsule (300 mg) by mouth once daily at bedtime. 12/19/23 6/16/24  Topher Blackwood MD   lisinopriL-hydrochlorothiazide 10-12.5 mg tablet Take 1 tablet by mouth once daily. 6/19/24   Topher Blackwood MD   meloxicam (Mobic) 15 mg tablet Take 1 tablet (15 mg) by mouth once daily. 6/19/24   Topher Blackwood MD   multivitamin tablet Take 1 tablet by mouth once daily.    Historical Provider, MD   naloxone (Narcan) 4 mg/0.1 mL nasal spray Administer 1 spray (4 mg) into affected nostril(s) if needed for opioid reversal. May repeat every 2-3 minutes if needed, alternating nostrils, until medical assistance becomes available.  Patient not taking: Reported on 9/30/2024 9/23/24   Lan Grimes MD   nystatin (Mycostatin) 100,000 unit/gram powder Apply 1 Application topically 2 times a day. 6/19/24 6/19/25  Topher Blackwood MD   omeprazole (PriLOSEC) 20 mg DR capsule Take 1 capsule (20 mg) by mouth 2 times a day. 6/19/24   Topher Blackwood MD   traMADol (Ultram) 50 mg tablet Take 1 tablet (50 mg) by mouth 2 times a day as needed for moderate pain (4 - 6). 9/23/24 10/23/24  Lan Grimes MD        PAT SABRINA     DASI Risk Score    No data to display       Caprini DVT Assessment    No data to  display       Modified Frailty Index    No data to display       CHADS2 Stroke Risk  Current as of about an hour ago        N/A 3 to 100%: High Risk   2 to < 3%: Medium Risk   0 to < 2%: Low Risk     Last Change: N/A          This score determines the patient's risk of having a stroke if the patient has atrial fibrillation.        This score is not applicable to this patient. Components are not calculated.          Revised Cardiac Risk Index    No data to display       Apfel Simplified Score    No data to display       Risk Analysis Index Results This Encounter    No data found in the last 10 encounters.     Providers:                                                                                                           PCP: Topher Blackwood 25 presents for Hip Pain (Right hip/leg discomfort, x 2 months).       NeuroSx: Dameon Jaime 25 follow up of segmental disease of the lumbar spine after her fusion with significant foraminal stenosis at the 2 proximal levels. She has low back pain with radiation into her legs with weakness worse on the right side than the left.   MRI and a CAT scan and plain x-rays of her lumbar spine. It appears that she has segmental disease with severe foraminal stenosis bilaterally at L3-4 and L2-3. On the CAT scan she also has degenerative changes at L1-2. We can see that she has a solid fusion from L4 to the sacrum. Prior history of spinal fusion.     Follows with pain management Dr. Lan Grimes, LOV 24 s/p epidural steroid injections        --TESTING:        - EK21  Sinus rhythm  RSR in V1  Normal variant of ECG      - CT Cardiac scorin23  Impression   1. Coronary artery calcium score of 190.68*.  2. Hiatal hernia.        - CT lumbar spine: 24  IMPRESSION:  Postoperative and degenerative changes are similar to previous, no acute fracture or subluxation of the lumbar spine.      - CT A/P: 23  Impression   Mild concentric wall thickening of  the nondilated descending colon suggesting low-grade acute infectious or inflammatory colitis.  Large sliding-type hiatal hernia.  Bilateral renal atrophy with nonobstructing 2 mm left renal calculus.         _________________________________________________________________  **Data input only. No medications, history or providers verified           Mya Green LPN  Preadmission Testing                    `

## 2024-10-16 ENCOUNTER — HOSPITAL ENCOUNTER (OUTPATIENT)
Dept: CARDIOLOGY | Facility: HOSPITAL | Age: 74
Discharge: HOME | End: 2024-10-16
Payer: COMMERCIAL

## 2024-10-16 ENCOUNTER — PRE-ADMISSION TESTING (OUTPATIENT)
Dept: PREADMISSION TESTING | Facility: HOSPITAL | Age: 74
End: 2024-10-16
Payer: MEDICARE

## 2024-10-16 VITALS
RESPIRATION RATE: 20 BRPM | WEIGHT: 208.1 LBS | SYSTOLIC BLOOD PRESSURE: 124 MMHG | TEMPERATURE: 97.8 F | DIASTOLIC BLOOD PRESSURE: 80 MMHG | OXYGEN SATURATION: 100 % | BODY MASS INDEX: 38.29 KG/M2 | HEIGHT: 62 IN | HEART RATE: 62 BPM

## 2024-10-16 DIAGNOSIS — M48.061 SPINAL STENOSIS OF LUMBAR REGION WITHOUT NEUROGENIC CLAUDICATION: ICD-10-CM

## 2024-10-16 DIAGNOSIS — R79.1 ABNORMAL COAGULATION PROFILE: ICD-10-CM

## 2024-10-16 DIAGNOSIS — Z01.818 PREOPERATIVE CLEARANCE: Primary | ICD-10-CM

## 2024-10-16 DIAGNOSIS — R79.9 ABNORMAL FINDING OF BLOOD CHEMISTRY, UNSPECIFIED: ICD-10-CM

## 2024-10-16 LAB
ABO GROUP (TYPE) IN BLOOD: NORMAL
ANION GAP SERPL CALC-SCNC: 17 MMOL/L (ref 10–20)
ANTIBODY SCREEN: NORMAL
APTT PPP: 31 SECONDS (ref 27–38)
BASOPHILS # BLD AUTO: 0.08 X10*3/UL (ref 0–0.1)
BASOPHILS NFR BLD AUTO: 1.2 %
BUN SERPL-MCNC: 26 MG/DL (ref 6–23)
CALCIUM SERPL-MCNC: 10.5 MG/DL (ref 8.6–10.6)
CHLORIDE SERPL-SCNC: 97 MMOL/L (ref 98–107)
CO2 SERPL-SCNC: 25 MMOL/L (ref 21–32)
CREAT SERPL-MCNC: 1.05 MG/DL (ref 0.5–1.05)
EGFRCR SERPLBLD CKD-EPI 2021: 56 ML/MIN/1.73M*2
EOSINOPHIL # BLD AUTO: 0.26 X10*3/UL (ref 0–0.4)
EOSINOPHIL NFR BLD AUTO: 3.9 %
ERYTHROCYTE [DISTWIDTH] IN BLOOD BY AUTOMATED COUNT: 12.4 % (ref 11.5–14.5)
EST. AVERAGE GLUCOSE BLD GHB EST-MCNC: 117 MG/DL
GLUCOSE SERPL-MCNC: 96 MG/DL (ref 74–99)
HBA1C MFR BLD: 5.7 %
HCT VFR BLD AUTO: 39.2 % (ref 36–46)
HGB BLD-MCNC: 13.1 G/DL (ref 12–16)
IMM GRANULOCYTES # BLD AUTO: 0.02 X10*3/UL (ref 0–0.5)
IMM GRANULOCYTES NFR BLD AUTO: 0.3 % (ref 0–0.9)
INR PPP: 1 (ref 0.9–1.1)
LYMPHOCYTES # BLD AUTO: 1.66 X10*3/UL (ref 0.8–3)
LYMPHOCYTES NFR BLD AUTO: 24.6 %
MCH RBC QN AUTO: 30.7 PG (ref 26–34)
MCHC RBC AUTO-ENTMCNC: 33.4 G/DL (ref 32–36)
MCV RBC AUTO: 92 FL (ref 80–100)
MONOCYTES # BLD AUTO: 0.61 X10*3/UL (ref 0.05–0.8)
MONOCYTES NFR BLD AUTO: 9.1 %
NEUTROPHILS # BLD AUTO: 4.11 X10*3/UL (ref 1.6–5.5)
NEUTROPHILS NFR BLD AUTO: 60.9 %
NRBC BLD-RTO: 0 /100 WBCS (ref 0–0)
PLATELET # BLD AUTO: 427 X10*3/UL (ref 150–450)
POTASSIUM SERPL-SCNC: 5.7 MMOL/L (ref 3.5–5.3)
PREALB SERPL-MCNC: 29.5 MG/DL (ref 18–40)
PROTHROMBIN TIME: 10.9 SECONDS (ref 9.8–12.8)
RBC # BLD AUTO: 4.27 X10*6/UL (ref 4–5.2)
RH FACTOR (ANTIGEN D): NORMAL
SODIUM SERPL-SCNC: 133 MMOL/L (ref 136–145)
WBC # BLD AUTO: 6.7 X10*3/UL (ref 4.4–11.3)

## 2024-10-16 PROCEDURE — 87081 CULTURE SCREEN ONLY: CPT

## 2024-10-16 PROCEDURE — 93010 ELECTROCARDIOGRAM REPORT: CPT | Performed by: INTERNAL MEDICINE

## 2024-10-16 PROCEDURE — 99205 OFFICE O/P NEW HI 60 MIN: CPT | Performed by: NURSE ANESTHETIST, CERTIFIED REGISTERED

## 2024-10-16 PROCEDURE — 82374 ASSAY BLOOD CARBON DIOXIDE: CPT

## 2024-10-16 PROCEDURE — 93005 ELECTROCARDIOGRAM TRACING: CPT

## 2024-10-16 PROCEDURE — 83036 HEMOGLOBIN GLYCOSYLATED A1C: CPT

## 2024-10-16 PROCEDURE — 86901 BLOOD TYPING SEROLOGIC RH(D): CPT

## 2024-10-16 PROCEDURE — 85025 COMPLETE CBC W/AUTO DIFF WBC: CPT

## 2024-10-16 PROCEDURE — 85730 THROMBOPLASTIN TIME PARTIAL: CPT

## 2024-10-16 PROCEDURE — 84134 ASSAY OF PREALBUMIN: CPT

## 2024-10-16 PROCEDURE — 36415 COLL VENOUS BLD VENIPUNCTURE: CPT

## 2024-10-16 RX ORDER — CHLORHEXIDINE GLUCONATE ORAL RINSE 1.2 MG/ML
15 SOLUTION DENTAL AS NEEDED
Qty: 473 ML | Refills: 0 | Status: SHIPPED | OUTPATIENT
Start: 2024-10-16 | End: 2024-10-18

## 2024-10-16 RX ORDER — CHLORHEXIDINE GLUCONATE 40 MG/ML
SOLUTION TOPICAL DAILY PRN
Qty: 473 ML | Refills: 0 | Status: SHIPPED | OUTPATIENT
Start: 2024-10-16 | End: 2024-10-19

## 2024-10-16 ASSESSMENT — LIFESTYLE VARIABLES: SMOKING_STATUS: NONSMOKER

## 2024-10-16 ASSESSMENT — PAIN - FUNCTIONAL ASSESSMENT: PAIN_FUNCTIONAL_ASSESSMENT: 0-10

## 2024-10-16 ASSESSMENT — DUKE ACTIVITY SCORE INDEX (DASI)
CAN YOU WALK A BLOCK OR TWO ON LEVEL GROUND: NO
CAN YOU WALK INDOORS, SUCH AS AROUND YOUR HOUSE: YES
CAN YOU CLIMB A FLIGHT OF STAIRS OR WALK UP A HILL: YES
CAN YOU HAVE SEXUAL RELATIONS: NO
CAN YOU DO HEAVY WORK AROUND THE HOUSE LIKE SCRUBBING FLOORS OR LIFTING AND MOVING HEAVY FURNITURE: YES
TOTAL_SCORE: 24.2
CAN YOU PARTICIPATE IN STRENOUS SPORTS LIKE SWIMMING, SINGLES TENNIS, FOOTBALL, BASKETBALL, OR SKIING: NO
CAN YOU DO LIGHT WORK AROUND THE HOUSE LIKE DUSTING OR WASHING DISHES: YES
CAN YOU TAKE CARE OF YOURSELF (EAT, DRESS, BATHE, OR USE TOILET): YES
DASI METS SCORE: 5.7
CAN YOU RUN A SHORT DISTANCE: NO
CAN YOU DO MODERATE WORK AROUND THE HOUSE LIKE VACUUMING, SWEEPING FLOORS OR CARRYING GROCERIES: YES
CAN YOU PARTICIPATE IN MODERATE RECREATIONAL ACTIVITIES LIKE GOLF, BOWLING, DANCING, DOUBLES TENNIS OR THROWING A BASEBALL OR FOOTBALL: NO
CAN YOU DO YARD WORK LIKE RAKING LEAVES, WEEDING OR PUSHING A MOWER: NO

## 2024-10-16 ASSESSMENT — ENCOUNTER SYMPTOMS
ENDOCRINE NEGATIVE: 1
NECK NEGATIVE: 1
CARDIOVASCULAR NEGATIVE: 1
GASTROINTESTINAL NEGATIVE: 1
RESPIRATORY NEGATIVE: 1
MYALGIAS: 1
EYES NEGATIVE: 1
ARTHRALGIAS: 1
CONSTITUTIONAL NEGATIVE: 1
NEUROLOGICAL NEGATIVE: 1

## 2024-10-16 ASSESSMENT — ACTIVITIES OF DAILY LIVING (ADL): ADL_SCORE: 0

## 2024-10-16 ASSESSMENT — PAIN SCALES - GENERAL: PAINLEVEL_OUTOF10: 7

## 2024-10-16 NOTE — CPM/PAT H&P
"CPM/PAT Evaluation       Name: Loretta Walsh (Loretta Walsh \"\")  /Age: 1950/74 y.o.     Visit Type:   In-Person         Past Medical History:   Diagnosis Date    Agatston coronary artery calcium score between 100 and 199     2023--Coronary artery calcium score of 190.68*    Anxiety disorder, unspecified     Anxiety    Arthritis     Chronic kidney disease, stage 3a (Multi)     GERD (gastroesophageal reflux disease)     Glaucoma     Hypertension     Major depressive disorder, single episode, in full remission (CMS-[facility])     Depression, major, in remission    Morbid (severe) obesity due to excess calories (Multi)     Nephrolithiasis     2023-nonobstructing 2 mm left renal calculus.    Osteopenia     Peripheral neuropathy     Personal history of cervical dysplasia     History of cervical dysplasia    Spinal stenosis, site unspecified 2022    Foraminal stenosis of lumbar region    Vertigo        Past Surgical History:   Procedure Laterality Date    CARPAL TUNNEL RELEASE Bilateral 2018    Neuroplasty Decompression Median Nerve At Carpal Tunnel     SECTION, LOW TRANSVERSE      COLONOSCOPY      DILATION AND CURETTAGE OF UTERUS      EYE SURGERY  2018    Eye Surgery    HYSTEROSCOPY      KNEE ARTHROPLASTY Bilateral     LUMBAR FUSION      OTHER SURGICAL HISTORY  10/05/2020    Loop electrosurgical excision procedure       Patient  has no history on file for sexual activity.    Family History   Problem Relation Name Age of Onset    Ovarian cancer Mother      Diabetes Father      Dementia Father         No Known Allergies    Prior to Admission medications    Medication Sig Start Date End Date Taking? Authorizing Provider   ascorbic acid (Vitamin C) 500 mg tablet Take 1 tablet (500 mg) by mouth once daily.   Yes Historical Provider, MD   atorvastatin (Lipitor) 40 mg tablet Take 1 tablet (40 mg) by mouth once daily. 24  Yes Topher Blackwood MD   cholecalciferol (Vitamin D-3) 50 mcg " (2,000 unit) capsule Take by mouth. 12/1/20  Yes Historical Provider, MD   escitalopram (Lexapro) 20 mg tablet Take 1 tablet (20 mg) by mouth once daily. 6/19/24  Yes Topher Blackwood MD   lisinopriL-hydrochlorothiazide 10-12.5 mg tablet Take 1 tablet by mouth once daily. 6/19/24  Yes Topher Blackwood MD   meloxicam (Mobic) 15 mg tablet Take 1 tablet (15 mg) by mouth once daily. 6/19/24  Yes Topher Blackwood MD   multivitamin tablet Take 1 tablet by mouth once daily.   Yes Historical Provider, MD   nystatin (Mycostatin) 100,000 unit/gram powder Apply 1 Application topically 2 times a day. 6/19/24 6/19/25 Yes Topher Blackwood MD   omeprazole (PriLOSEC) 20 mg DR capsule Take 1 capsule (20 mg) by mouth 2 times a day. 6/19/24  Yes Topher Blackwood MD   traMADol (Ultram) 50 mg tablet Take 1 tablet (50 mg) by mouth 2 times a day as needed for moderate pain (4 - 6). 9/23/24 10/23/24 Yes Lan Grimes MD   chlorhexidine (Hibiclens) 4 % external liquid Apply topically once daily as needed (Shower with for 3 days prior to surgery, and morning of surgery) for up to 3 days. 10/16/24 10/19/24  SMILEY Dominguez-CRNA   chlorhexidine (Peridex) 0.12 % solution Use 15 mL in the mouth or throat if needed (after brushing teeth, rinse mouth with mouthwash for 30 seconds, swish and spit.  Do not swallow) for up to 2 days. 10/16/24 10/18/24  TIFFANY Dominguez   diazePAM (Valium) 5 mg tablet Take 1 tablet (5 mg) by mouth 1 time for 1 dose. 30 min before scheduled procedure 8/4/24 8/4/24  Lan Grimes MD   gabapentin (Neurontin) 300 mg capsule Take 1 capsule (300 mg) by mouth once daily at bedtime. 12/19/23 6/16/24  Topher Blackwood MD   naloxone (Narcan) 4 mg/0.1 mL nasal spray Administer 1 spray (4 mg) into affected nostril(s) if needed for opioid reversal. May repeat every 2-3 minutes if needed, alternating nostrils, until medical assistance becomes available.  Patient not taking: Reported on 10/16/2024 9/23/24   Lan  MD Cornelio        [facility] ROS:   Constitutional:   neg    Neuro/Psych:   neg    Eyes:   neg    Ears:   neg    Nose:   neg    Mouth:   neg    Throat:   neg    Neck:   neg    Cardio:   neg    Respiratory:   neg    Endocrine:   neg    GI:   neg    :   neg    Musculoskeletal:    arthralgias   myalgias  Hematologic:   neg    Skin:  neg        Physical Exam  Vitals and nursing note reviewed.   Constitutional:       General: She is awake.      Appearance: Normal appearance. She is normal weight.   HENT:      Head: Normocephalic and atraumatic.   Eyes:      General: Lids are normal.      Conjunctiva/sclera: Conjunctivae normal.   Neck:      Trachea: Trachea normal.   Cardiovascular:      Rate and Rhythm: Normal rate and regular rhythm.      Pulses: Normal pulses.      Heart sounds: Normal heart sounds, S1 normal and S2 normal.   Pulmonary:      Effort: Pulmonary effort is normal.      Breath sounds: Normal breath sounds and air entry.   Abdominal:      General: Abdomen is protuberant.      Palpations: Abdomen is soft.   Musculoskeletal:      Cervical back: Full passive range of motion without pain and normal range of motion.      Right lower leg: No edema.      Left lower leg: No edema.   Skin:     General: Skin is warm and dry.      Capillary Refill: Capillary refill takes less than 2 seconds.   Neurological:      General: No focal deficit present.      Mental Status: She is alert and oriented to person, place, and time.      GCS: GCS eye subscore is 4. GCS verbal subscore is 5. GCS motor subscore is 6.      Cranial Nerves: Cranial nerves 2-12 are intact.      Sensory: Sensation is intact.      Motor: Motor function is intact.      Coordination: Coordination is intact.      Gait: Gait is intact.      Comments: Chronic low back pain.  B carpal tunnel surgeries, slight 4/5 hand grasps.   Psychiatric:         Attention and Perception: Attention and perception normal.         Mood and Affect: Mood and affect normal.          Speech: Speech normal.         Behavior: Behavior normal. Behavior is cooperative.         Thought Content: Thought content normal.         Cognition and Memory: Cognition and memory normal.         Judgment: Judgment normal.          PAT AIRWAY:   Airway:     Mallampati::  I    TM distance::  >3 FB    Neck ROM::  Full  normal        Visit Vitals  /80   Pulse 62   Temp 36.6 °C (97.8 °F) (Oral)   Resp 20   Sat 100%    DASI Risk Score      Flowsheet Row Pre-Admission Testing [facility] 10/16/2024 in [facility]   Can you take care of yourself (eat, dress, bathe, or use toilet)?  2.75 filed at 10/16/2024 0950   Can you walk indoors, such as around your house? 1.75 filed at 10/16/2024 0950   Can you walk a block or two on level ground?  0 filed at 10/16/2024 0950   Can you climb a flight of stairs or walk up a hill? 5.5 filed at 10/16/2024 0950   Can you run a short distance? 0 filed at 10/16/2024 0950   Can you do light work around the house like dusting or washing dishes? 2.7 filed at 10/16/2024 0950   Can you do moderate work around the house like vacuuming, sweeping floors or carrying groceries? 3.5 filed at 10/16/2024 0950   Can you do heavy work around the house like scrubbing floors or lifting and moving heavy furniture?  8 filed at 10/16/2024 0950   Can you do yard work like raking leaves, weeding or pushing a mower? 0 filed at 10/16/2024 0950   Can you have sexual relations? 0 filed at 10/16/2024 0950   Can you participate in moderate recreational activities like golf, bowling, dancing, doubles tennis or throwing a baseball or football? 0 filed at 10/16/2024 0950   Can you participate in strenous sports like swimming, singles tennis, football, basketball, or skiing? 0 filed at 10/16/2024 0950   DASI SCORE 24.2 filed at 10/16/2024 0950   METS Score (Will be calculated only when all the questions are answered) 5.7 filed at 10/16/2024 0950          Caprini DVT Assessment      Flowsheet Row  Pre-Admission Testing from 10/16/2024 in [facility]   DVT Score 10 filed at 10/16/2024 1027   Surgical Factors Major surgery planned, lasting over 3 hours filed at 10/16/2024 1027   BMI 31-40 (Obesity) filed at 10/16/2024 1027          Modified Frailty Index      Flowsheet Row Pre-Admission Testing from 10/16/2024 in [facility]   Non-independent functional status (problems with dressing, bathing, personal grooming, or cooking) 0 filed at 10/16/2024 1028   History of diabetes mellitus  0 filed at 10/16/2024 1028   History of COPD 0 filed at 10/16/2024 1028   History of CHF No filed at 10/16/2024 1028   History of MI 0 filed at 10/16/2024 1028   History of Percutaneous Coronary Intervention, Cardiac Surgery, or Angina No filed at 10/16/2024 1028   Hypertension requiring the use of medication  0.0909 filed at 10/16/2024 1028   Peripheral vascular disease 0 filed at 10/16/2024 1028   Impaired sensorium (cognitive impairement or loss, clouding, or delirium) 0 filed at 10/16/2024 1028   TIA or CVA withouy residual deficit 0 filed at 10/16/2024 1028   Cerebrovascular accident with deficit 0 filed at 10/16/2024 1028   Modified Frailty Index Calculator .0909 filed at 10/16/2024 1028          CHADS2 Stroke Risk  Current as of 5 hours ago        N/A 3 to 100%: High Risk   2 to < 3%: Medium Risk   0 to < 2%: Low Risk     Last Change: N/A          This score determines the patient's risk of having a stroke if the patient has atrial fibrillation.        This score is not applicable to this patient. Components are not calculated.          Revised Cardiac Risk Index      Flowsheet Row Pre-Admission Testing from 10/16/2024 in [facility]   High-Risk Surgery (Intraperitoneal, Intrathoracic,Suprainguinal vascular) 0 filed at 10/16/2024 1028   History of ischemic heart disease (History of MI, History of positive exercuse test, Current chest paint considered due to myocardial ischemia,  Use of nitrate therapy, ECG with pathological Q Waves) 0 filed at 10/16/2024 1028   History of congestive heart failure (pulmonary edemia, bilateral rales or S3 gallop, Paroxysmal nocturnal dyspnea, CXR showing pulmonary vascular redistribution) 0 filed at 10/16/2024 1028   History of cerebrovascular disease (Prior TIA or stroke) 0 filed at 10/16/2024 1028   Pre-operative insulin treatment 0 filed at 10/16/2024 1028   Pre-operative creatinine>2 mg/dl 0 filed at 10/16/2024 1028   Revised Cardiac Risk Calculator 0 filed at 10/16/2024 1028          Apfel Simplified Score      Flowsheet Row Pre-Admission Testing from 10/16/2024 in [facility]   Smoking status 1 filed at 10/16/2024 1028   History of motion sickness or PONV  0 filed at 10/16/2024 1028   Use of postoperative opioids 1 filed at 10/16/2024 1028   Gender - Female 1=Yes filed at 10/16/2024 1028   Apfel Simplified Score Calculator 3 filed at 10/16/2024 1028          Risk Analysis Index Results This Encounter         10/16/2024  1029             Do you live in a place other than your own home?: 0    Any history of chronic (long-term) congestive heart failure (CHF)?: 0 No    Any shortness of breath when resting?: 0 No    In the past five years, have you been diagnosed with or treated for cancer?: No    During the last 3 months has it become difficult for you to remember things or organize your thoughts?: 0 No    Have you lost weight of 10 pounds or more in the past 3 months without trying?: 0 No    Do you have any loss of appetitie?: 0 No    Getting Around (Mobility): 0 Can get around without help    Eatin Can plan and prepare own meals    Toiletin Can use toilet without any help    Personal Hygiene (Bathing, Hand Washing, Changing Clothes): 0 Can shower or bathe without any help    OLIVER Cancer History: Patient does not indicate history of cancer          Stop Bang Score      Flowsheet Row Pre-Admission Testing from 10/16/2024 in   [facility]   Do you snore loudly? 0 filed at 10/16/2024 0949   Do you often feel tired or fatigued after your sleep? 0 filed at 10/16/2024 0949   Has anyone ever observed you stop breathing in your sleep? 0 filed at 10/16/2024 0949   Do you have or are you being treated for high blood pressure? 1 filed at 10/16/2024 0949   Recent BMI (Calculated) 38 filed at 10/16/2024 0949   Is BMI greater than 35 kg/m2? 1=Yes filed at 10/16/2024 0949   Age older than 50 years old? 1=Yes filed at 10/16/2024 0949   Is your neck circumference greater than 17 inches (Male) or 16 inches (Female)? 0 filed at 10/16/2024 0949   Gender - Male 0=No filed at 10/16/2024 0949   STOP-BANG Total Score 3 filed at 10/16/2024 0949            No results found for this or any previous visit (from the past 24 hours).     Assessment and Plan:    74 y.o.  female  scheduled for L2-4 fusion on 10/29/24 with Dr. Jaime for chronic lower back pain.  PMHX includes HTN, obesity, kidney stones, glaucoma, GERD, arthritis, and anxiety .  Presents to CPM today for perioperative risk stratification and optimization    Neuro:  No neurologic diagnosis, however, the patient is at increased risk for perioperative delirium secondary to  age, type and duration of surgery, Patient instructed on and provided cognitive exercises  Patient is at increased risk for perioperative CVA secondary to  HTN, increased age, operative time > 2.5 hours    HEENT:  No HEENT diagnosis or significant findings on chart review or clinical presentation and evaluation. No further preoperative testing/intervention indicated at this time.    Cardiovascular:  No CV diagnosis or significant findings on chart review or clinical presentation and evaluation. No further preoperative testing or intervention is indicated at this time.  METS: 5.7  RCRI: 0 points, 3.9%  risk for postoperative MACE   MAILE: 0.4% risk for 30 day postoperative MACE  EKG - taken in PAT on  10/16/24    Pulmonary:  No pulmonary diagnosis, however patient is at increased risk of perioperative complications secondary to  age > 60, obesity, major surgery, duration of surgery > 2 hours, types of anesthetic  Stop Bang score is 3 placing patient at moderate risk for MOISÉS  ARISCAT: 26-44 points, 13.3% risk of in-hospital postoperative pulmonary complication  PRODIGY: High risk for opioid induced respiratory depression  Pumonary toilet education discussed, patient also provided deep breathing exercises and incentive spirometry educational handout.  Surgeon's office already mailed ERAS kit with an IS in it.  Reviewed use    Renal:   No renal diagnosis, however patient is at increase risk for perioperative renal complications secondary to  Age equal to or greater than 56, BMI equal to or greater than 30, HTN, use of an ace, arb, or NSAID  Pt at Moderate risk for perioperative SHALONDA based on Dynamic Predictive Scoring Tool for Perioperative SHALONDA     Endocrine:  No endocrine diagnosis or significant findings on chart review or clinical presentation and evaluation. No further testing or intervention is indicated at this time.    Hematologic:  No hematologic diagnosis, however patient is at an increased risk for DVT  Caprini Score 10, patient at High risk for perioperative DVT.  Patient provided with VTE education/handout.  Pt consents to blood and blood products, type and screen collected.     Gastrointestinal:   No GI diagnosis or significant findings on chart review or clinical presentation and evaluation.   Eat-10 score 0  Apfel 3    Infectious disease:   No infectious diagnosis or significant findings on chart review or clinical presentation and evaluation.   Prescription provided for CHG body wash and dental rinse. CHG use instructions reviewed and provided to patient.  Staph screen collected    Musculoskeletal:   No diagnosis or significant findings on chart review or clinical presentation and evaluation.      Anesthesia/Airway:  No anesthesia complications      Medication instructions and NPO guidelines reviewed with the patient.  All questions or concerns discussed and addressed.

## 2024-10-16 NOTE — PREPROCEDURE INSTRUCTIONS
Thank you for visiting [facility] ([facility]) today for your pre-procedure evaluation.    This summary includes instructions and information to aid you during your perioperative period.  Please read carefully. If you have any questions about your visit today, please call the number listed above.  If you become ill or have any changes to your health before your surgery, please contact your primary care provider and alert your surgeon.    Preparing for your Surgery       Exercises  Preoperative Deep Breathing Exercises  Why it is important to do deep breathing exercises before my surgery?  Deep breathing exercises strengthen your breathing muscles.  This helps you to recover after your surgery and decreases the chance of breathing complications.  How are the deep breathing exercises done?  Sit straight with your back supported.  Breathe in deeply and slowly through your nose. Your lower rib cage should expand and your abdomen may move forward.  Hold that breath for 3 to 5 seconds.  Breathe out through pursed lips, slowly and completely.  Rest and repeat 10 times every hour while awake.  Rest longer if you become dizzy or lightheaded.       Incentive Spirometer   You were provided with an incentive spirometer in [facility], please follow the below instructions.   You were not provided an incentive spirometer in [facility], please disregard the incentive spirometer instructions  What is an incentive spirometer?  An incentive spirometer is a device used before and after surgery to “exercise” your lungs.  It helps you to take deeper breaths to expand your lungs.  Below is an example of a basic incentive spirometer.  The device you receive may differ slightly but they all function the same.    Why do I need to use an incentive spirometer?  Using your incentive spirometer prepares your lungs for surgery and helps prevent lung problems after surgery.  How do I use my incentive spirometer?  When you're using  your incentive spirometer, make sure to breathe through your mouth. If you breathe through your nose, the incentive spirometer won't work properly. You can hold your nose if you have trouble.  If you feel dizzy at any time, stop and rest. Try again at a later time.  Follow the steps below:  Set up your incentive spirometer, expand the flexible tubing and connect to the outlet.  Sit upright in a chair or bed. Hold the incentive spirometer at eye level.   Put the mouthpiece in your mouth and close your lips tightly around it. Slowly breathe out (exhale) completely.  Breathe in (inhale) slowly through your mouth as deeply as you can. As you take a breath, you will see the piston rise inside the large column. While the piston rises, the indicator should move upwards. It should stay in between the 2 arrows (see Figure).  Try to get the piston as high as you can, while keeping the indicator between the arrows.   If the indicator doesn't stay between the arrows, you're breathing either too fast or too slow.  When you get it as high as you can, hold your breath for 10 seconds, or as long as possible. While you're holding your breath, the piston will slowly fall to the base of the spirometer.  Once the piston reaches the bottom of the spirometer, breathe out slowly through your mouth. Rest for a few seconds.  Repeat 10 times. Try to get the piston to the same level with each breath.  Repeat every hour while awake  You can carefully clean the outside of the mouthpiece with an alcohol wipe or soap and water.      Preoperative Brain Exercises    What are brain exercises?  A brain exercise is any activity that engages your thinking (cognitive) skills.    What types of activities are considered brain exercises?  Jigsaw puzzles, crossword puzzles, word jumble, memory games, word search, and many more.  Many can be found free online or on your phone via a mobile charmaine.    Why should I do brain exercises before my surgery?  More  recent research has shown brain exercise before surgery can lower the risk of postoperative delirium (confusion) which can be especially important for older adults.  Patients who did brain exercises for 5 to 10 hours the days before surgery, cut their risk of postoperative delirium in half up to 1 week after surgery.    Sit-to-Stand Exercise    What is the sit-to-stand exercise?  The sit-to-stand exercise strengthens the muscles of your lower body and muscles in the center of your body (core muscles for stability) helping to maintain and improve your strength and mobility.  How do I do the sit-to-stand exercise?  The goal is to do this exercise without using your arms or hands.  If this is too difficult, use your arms and hands or a chair with armrests to help slowly push yourself to the standing position and lower yourself back to the sitting position. As the movement becomes easier use your arms and hands less.    Steps to the sit-to-stand exercise  Sit up tall in a sturdy chair, knees bent, feet flat on the floor shoulder-width apart.  Shift your hips/pelvis forward in the chair to correctly position yourself for the next movement.  Lean forward at your hips.  Stand up straight putting equal weight on both feet.  Check to be sure you are properly aligned with the chair, in a slow controlled movement sit back down.  Repeat this exercise 10-15 times.  If needed you can do it fewer times until your strength improves.  Rest for 1 minute.  Do another 10-15 sit-to-stand exercises.  Try to do this in the morning and evening.        Instructions    Preoperative Fasting Guidelines    Why must I stop eating and drinking near surgery time?  With sedation, food or liquid in your stomach can enter your lungs causing serious complications  Food can increase nausea and vomiting  When do I need to stop eating and drinking before my surgery?      Do not eat any food after midnight the night before your surgery/procedure. You may  have up to 13.5 ounces of clear liquid until TWO hours before your instructed arrival time to the hospital.  This includes water, black tea/coffee, (no milk or cream) apple juice, and electrolyte drinks (Gatorade). You may chew gum until TWO hours before your surgery/procedure , Do not eat any food or drink any liquids after midnight the night before your surgery/procedure.  You may have sips of water to take medications.            Simple things you can do to help prevent blood clots     Blood clots are blockages that can form in the body's veins. When a blood clot forms in your deep veins, it may be called a deep vein thrombosis, or DVT for short. Blood clots can happen in any part of the body where blood flows, but they are most common in the arms and legs. If a piece of a blood clot breaks free and travels to the lungs, it is called a pulmonary embolus (PE). A PE can be a very serious problem.         Being in the hospital or having surgery can raise your chances of getting a blood clot because you may not be well enough to move around as much as you normally do.         Ways you can help prevent blood clots in the hospital       Wearing SCDs  SCDs stands for Sequential Compression Devices.   SCDs are special sleeves that wrap around your legs. They attach to a pump that fills them with air to gently squeeze your legs every few minutes.  This helps return the blood in your legs to your heart.   SCDs should only be taken off when walking or bathing. SCDs may not be comfortable, but they can help save your life.              Pump SCD leg sleeves  Wearing compression stockings - if your doctor orders them. These special snug-fitting stockings gently squeeze your legs to help blood flow.       Walking. Walking helps move the blood in your legs.   If your doctor says it is ok, try walking the halls at least   5 times a day. Ask us to help you get up, so you don't fall.      Taking any blood-thinning medicines your  "doctor orders.              Ways you can help prevent blood clots at home         Wearing compression stockings - if your doctor orders them.   Walking - to help move the blood in your legs.    Taking any blood-thinning medicines your doctor orders.      Signs of a blood clot or PE    Tell your doctor or nurse right away if you have any of the problems listed below.         If you are at home, seek medical care right away. Call 911 for chest pain or problems breathing.            Signs of a blood clot (DVT) - such as pain, swelling, redness, or warmth in your arm or legs.  Signs of a pulmonary embolism (PE) - such as chest pain or feeling short of breath      Tobacco and Alcohol;  Do not drink alcohol or smoke within 24 hours of surgery.  It is best to quit smoking for as long as possible before any surgery or procedure.       Other Instructions  Why did I have my nose, under my arms, and groin swabbed? The purpose of the swab is to identify Staphylococcus aureus inside your nose or on your skin.  The swab was sent to the laboratory for culture.  A positive swab/culture for Staphylococcus aureus is called colonization or carriage.   What is Staphylococcus aureus? Staphylococcus aureus, also known as \"staph\", is a germ found on the skin or in the nose of healthy people.  Sometimes Staphylococcus aureus can get into the body and cause an infection.  This can be minor (such as pimples, boils, or other skin problems).  It might also be serious (such as a blood infection, pneumonia, or a surgical site infection). What is Staphylococcus aureus colonization or carriage? Colonization or carriage means that a person has the germ but is not sick from it.  These bacteria can be spread on the hands or when breathing or sneezing. How is Staphylococcus aureus spread? It is most often spread by close contact with a person or item that carries it. What happens if my culture is positive for Staphylococcus aureus? Your doctor/medical " team will use this information to guide any antibiotic treatment which may be necessary.  Regardless of the culture results, we will clean the inside of your nose with a betadine swab just before you have your surgery. Will I get an infection if I have Staphylococcus aureus in my nose or on my skin? Anyone can get an infection with Staphylococcus aureus.  However, the best way to reduce your risk of infection is to follow the instructions provided to you for the use of your CHG soap and dental rinse.  , Body Wash; What is a home preoperative antibacterial shower? This shower is a way of cleaning the skin with a germ-killing solution before surgery.  The solution contains chlorhexidine, commonly known as CHG.  CHG is a skin cleanser with germ-killing ability.  Let your doctor know if you are allergic to chlorhexidine. Why do I need to take a preoperative antibacterial shower? Skin is not sterile.  It is best to try to make your skin as free of germs as possible before surgery.  Proper cleansing with a germ-killing soap before surgery can lower the number of germs on your skin.  This helps to reduce the risk of infection at the surgical site.  Following the instructions listed below will help you prepare your skin for surgery.   How do I use the solution? Steps:  Begin using your CHG soap 5 days before your scheduled surgery on ___________.   First, wash and rinse your hair using the CHG soap. Keep CHG soap away from ear canals and eyes.  Rinse completely, do not condition.  Hair extensions should be removed. , Oral/Dental Rinse: What is oral/dental rinse?  It is a mouthwash. It is a way of cleaning the mouth with a germ-killing solution before your surgery.  The solution contains chlorhexidine, commonly known as CHG. It is used inside the mouth to kill a bacteria known as Staphylococcus aureus.  Let your doctor know if you are allergic to Chlorhexidine. Why do I need to use CHG oral/dental rinse? The CHG oral/dental  rinse helps to kill a bacteria in your mouth known as Staphylococcus aureus.  This reduces the risk of infection at the surgical site.  Using your CHG oral/dental rinse STEPS: Use your CHG oral/dental rinse after you brush your teeth the night before (at bedtime) and the morning of your surgery.  Follow all directions on your prescription label.  Use the cap on the container to measure 15 ml.  Swish (gargle if you can) the mouthwash in your mouth for at least 30 seconds, (do not swallow) and spit out.  After you use your CHG rinse, do not rinse your mouth with water, drink or eat.  Please refer to the prescription label for the appropriate time to resume oral intake What side effects might I have using the CHG oral/dental rinse? CHG rinse will stick to plaque on the teeth.  Brush and floss just before use.  Teeth brushing will help avoid staining of plaque during use.           The Week before Surgery        Seven days before Surgery  Check your [facility] medication instructions  Do the exercises provided to you by [facility]   Arrange for a responsible, adult licensed  to take you home after surgery and stay with you for 24 hours.  You will not be permitted to drive yourself home if you have received any anesthetic/sedation  Six days before surgery  Check your [facility] medication instructions  Do the exercises provided to you by [facility]   Start using Chlorhexidene (CHG) body wash if prescribed  Five days before surgery  Check your [facility] medication instructions  Do the exercises provided to you by [facility]   Continue to use CHG body wash if prescribed  Three days before surgery  Check your [facility] medication instructions  Do the exercises provided to you by [facility]   Continue to use CHG body wash if prescribed  Two days before surgery  Check your [facility] medication instructions  Do the exercises provided to you by [facility]   Continue to use CHG body wash if prescribed    The Day before Surgery       Check your [facility] medication and all other [facility] instructions  including when to stop eating and drinking  You will be called with your arrival time for surgery in the late afternoon.  If you do not receive a call please reach out to your surgeon's office.  Do not smoke or drink 24 hours before surgery  Prepare items to bring with you to the hospital  Shower with your chlorhexidine wash if prescribed  Brush your teeth and use your chlorhexidine dental rinse if prescribed    The Day of Surgery       Check your [facility] medication instructions  Ensure you follow the instructions for when to stop eating and drinking  Shower, if prescribed use CHG.  Do not apply any lotions, creams, moisturizers, perfume or deodorant  Brush your teeth and use your CHG dental rinse if prescribed  Wear loose comfortable clothing  Avoid make-up  Remove  jewelry and piercings, consider professional piercing removal with a plastic spacer if needed  Bring photo ID and Insurance card  Bring an accurate medication list that includes medication dose, frequency and allergies  Bring a copy of your advanced directives (will, health care power of )  Bring any devices and controllers as well as medical devices you have been provided with for surgery (CPAP, slings, braces, etc.)  Dentures, eyeglasses, and contacts will be removed before surgery, please bring cases for contacts or glasses

## 2024-10-17 ENCOUNTER — ANESTHESIA EVENT (OUTPATIENT)
Dept: OPERATING ROOM | Facility: HOSPITAL | Age: 74
End: 2024-10-17
Payer: MEDICARE

## 2024-10-18 LAB — STAPHYLOCOCCUS SPEC CULT: ABNORMAL

## 2024-10-21 LAB
ATRIAL RATE: 60 BPM
P AXIS: 30 DEGREES
P OFFSET: 184 MS
P ONSET: 126 MS
PR INTERVAL: 194 MS
Q ONSET: 223 MS
QRS COUNT: 9 BEATS
QRS DURATION: 84 MS
QT INTERVAL: 426 MS
QTC CALCULATION(BAZETT): 426 MS
QTC FREDERICIA: 426 MS
R AXIS: 21 DEGREES
T AXIS: 0 DEGREES
T OFFSET: 436 MS
VENTRICULAR RATE: 60 BPM

## 2024-10-21 PROCEDURE — 93005 ELECTROCARDIOGRAM TRACING: CPT

## 2024-10-29 ENCOUNTER — APPOINTMENT (OUTPATIENT)
Dept: RADIOLOGY | Facility: HOSPITAL | Age: 74
End: 2024-10-29
Payer: MEDICARE

## 2024-10-29 ENCOUNTER — HOSPITAL ENCOUNTER (INPATIENT)
Facility: HOSPITAL | Age: 74
LOS: 3 days | Discharge: HOME HEALTH | End: 2024-11-01
Attending: STUDENT IN AN ORGANIZED HEALTH CARE EDUCATION/TRAINING PROGRAM | Admitting: STUDENT IN AN ORGANIZED HEALTH CARE EDUCATION/TRAINING PROGRAM
Payer: MEDICARE

## 2024-10-29 ENCOUNTER — ANESTHESIA (OUTPATIENT)
Dept: OPERATING ROOM | Facility: HOSPITAL | Age: 74
End: 2024-10-29
Payer: MEDICARE

## 2024-10-29 DIAGNOSIS — M19.90 ARTHRITIS: ICD-10-CM

## 2024-10-29 DIAGNOSIS — Z74.09 IMPAIRED FUNCTIONAL MOBILITY AND ENDURANCE: ICD-10-CM

## 2024-10-29 DIAGNOSIS — M48.061 SPINAL STENOSIS OF LUMBAR REGION WITHOUT NEUROGENIC CLAUDICATION: Primary | ICD-10-CM

## 2024-10-29 DIAGNOSIS — G89.18 POSTOPERATIVE PAIN: ICD-10-CM

## 2024-10-29 DIAGNOSIS — I10 ESSENTIAL (PRIMARY) HYPERTENSION: ICD-10-CM

## 2024-10-29 DIAGNOSIS — M48.061 FORAMINAL STENOSIS OF LUMBAR REGION: ICD-10-CM

## 2024-10-29 DIAGNOSIS — K59.03 CONSTIPATION DUE TO PAIN MEDICATION: ICD-10-CM

## 2024-10-29 LAB
ABO GROUP (TYPE) IN BLOOD: NORMAL
ALBUMIN SERPL BCP-MCNC: 4 G/DL (ref 3.4–5)
ALP SERPL-CCNC: 92 U/L (ref 33–136)
ALT SERPL W P-5'-P-CCNC: 13 U/L (ref 7–45)
ANION GAP BLDV CALCULATED.4IONS-SCNC: 7 MMOL/L (ref 10–25)
ANION GAP SERPL CALC-SCNC: 17 MMOL/L (ref 10–20)
ANTIBODY SCREEN: NORMAL
AST SERPL W P-5'-P-CCNC: 16 U/L (ref 9–39)
BASE EXCESS BLDV CALC-SCNC: 0.8 MMOL/L (ref -2–3)
BILIRUB SERPL-MCNC: 0.5 MG/DL (ref 0–1.2)
BODY TEMPERATURE: 37 DEGREES CELSIUS
BUN SERPL-MCNC: 36 MG/DL (ref 6–23)
CA-I BLDV-SCNC: 1.14 MMOL/L (ref 1.1–1.33)
CALCIUM SERPL-MCNC: 9.5 MG/DL (ref 8.6–10.6)
CHLORIDE BLDV-SCNC: 106 MMOL/L (ref 98–107)
CHLORIDE SERPL-SCNC: 99 MMOL/L (ref 98–107)
CO2 SERPL-SCNC: 25 MMOL/L (ref 21–32)
CREAT SERPL-MCNC: 1.05 MG/DL (ref 0.5–1.05)
EGFRCR SERPLBLD CKD-EPI 2021: 56 ML/MIN/1.73M*2
GLUCOSE BLDV-MCNC: 92 MG/DL (ref 74–99)
GLUCOSE SERPL-MCNC: 97 MG/DL (ref 74–99)
HCO3 BLDV-SCNC: 23.1 MMOL/L (ref 22–26)
HCT VFR BLD EST: 34 % (ref 36–46)
HGB BLDV-MCNC: 11.2 G/DL (ref 12–16)
INHALED O2 CONCENTRATION: 21 %
LACTATE BLDV-SCNC: 1.5 MMOL/L (ref 0.4–2)
OXYHGB MFR BLDV: 97.5 % (ref 45–75)
PCO2 BLDV: 29 MM HG (ref 41–51)
PH BLDV: 7.51 PH (ref 7.33–7.43)
PO2 BLDV: 93 MM HG (ref 35–45)
POTASSIUM BLDV-SCNC: 3.8 MMOL/L (ref 3.5–5.3)
POTASSIUM SERPL-SCNC: 4.5 MMOL/L (ref 3.5–5.3)
PROT SERPL-MCNC: 6.7 G/DL (ref 6.4–8.2)
RH FACTOR (ANTIGEN D): NORMAL
SAO2 % BLDV: 99 % (ref 45–75)
SODIUM BLDV-SCNC: 132 MMOL/L (ref 136–145)
SODIUM SERPL-SCNC: 136 MMOL/L (ref 136–145)

## 2024-10-29 PROCEDURE — 1100000001 HC PRIVATE ROOM DAILY

## 2024-10-29 PROCEDURE — 2780000003 HC OR 278 NO HCPCS: Performed by: STUDENT IN AN ORGANIZED HEALTH CARE EDUCATION/TRAINING PROGRAM

## 2024-10-29 PROCEDURE — 22842 INSERT SPINE FIXATION DEVICE: CPT | Performed by: STUDENT IN AN ORGANIZED HEALTH CARE EDUCATION/TRAINING PROGRAM

## 2024-10-29 PROCEDURE — 22585 ARTHRD ANT NTRBD MIN DSC EA: CPT | Performed by: STUDENT IN AN ORGANIZED HEALTH CARE EDUCATION/TRAINING PROGRAM

## 2024-10-29 PROCEDURE — 7100000002 HC RECOVERY ROOM TIME - EACH INCREMENTAL 1 MINUTE: Performed by: STUDENT IN AN ORGANIZED HEALTH CARE EDUCATION/TRAINING PROGRAM

## 2024-10-29 PROCEDURE — C1821 INTERSPINOUS IMPLANT: HCPCS | Performed by: STUDENT IN AN ORGANIZED HEALTH CARE EDUCATION/TRAINING PROGRAM

## 2024-10-29 PROCEDURE — 0SB20ZZ EXCISION OF LUMBAR VERTEBRAL DISC, OPEN APPROACH: ICD-10-PCS | Performed by: STUDENT IN AN ORGANIZED HEALTH CARE EDUCATION/TRAINING PROGRAM

## 2024-10-29 PROCEDURE — 2500000001 HC RX 250 WO HCPCS SELF ADMINISTERED DRUGS (ALT 637 FOR MEDICARE OP)

## 2024-10-29 PROCEDURE — 2500000005 HC RX 250 GENERAL PHARMACY W/O HCPCS

## 2024-10-29 PROCEDURE — 2500000004 HC RX 250 GENERAL PHARMACY W/ HCPCS (ALT 636 FOR OP/ED)

## 2024-10-29 PROCEDURE — 8E0WXBZ COMPUTER ASSISTED PROCEDURE OF TRUNK REGION: ICD-10-PCS | Performed by: STUDENT IN AN ORGANIZED HEALTH CARE EDUCATION/TRAINING PROGRAM

## 2024-10-29 PROCEDURE — 2500000002 HC RX 250 W HCPCS SELF ADMINISTERED DRUGS (ALT 637 FOR MEDICARE OP, ALT 636 FOR OP/ED): Performed by: STUDENT IN AN ORGANIZED HEALTH CARE EDUCATION/TRAINING PROGRAM

## 2024-10-29 PROCEDURE — P9045 ALBUMIN (HUMAN), 5%, 250 ML: HCPCS | Mod: JZ

## 2024-10-29 PROCEDURE — 84132 ASSAY OF SERUM POTASSIUM: CPT

## 2024-10-29 PROCEDURE — 3700000001 HC GENERAL ANESTHESIA TIME - INITIAL BASE CHARGE: Performed by: STUDENT IN AN ORGANIZED HEALTH CARE EDUCATION/TRAINING PROGRAM

## 2024-10-29 PROCEDURE — 2500000002 HC RX 250 W HCPCS SELF ADMINISTERED DRUGS (ALT 637 FOR MEDICARE OP, ALT 636 FOR OP/ED)

## 2024-10-29 PROCEDURE — 22853 INSJ BIOMECHANICAL DEVICE: CPT | Performed by: STUDENT IN AN ORGANIZED HEALTH CARE EDUCATION/TRAINING PROGRAM

## 2024-10-29 PROCEDURE — 2500000004 HC RX 250 GENERAL PHARMACY W/ HCPCS (ALT 636 FOR OP/ED): Performed by: STUDENT IN AN ORGANIZED HEALTH CARE EDUCATION/TRAINING PROGRAM

## 2024-10-29 PROCEDURE — 61783 SCAN PROC SPINAL: CPT | Performed by: STUDENT IN AN ORGANIZED HEALTH CARE EDUCATION/TRAINING PROGRAM

## 2024-10-29 PROCEDURE — C1889 IMPLANT/INSERT DEVICE, NOC: HCPCS | Performed by: STUDENT IN AN ORGANIZED HEALTH CARE EDUCATION/TRAINING PROGRAM

## 2024-10-29 PROCEDURE — 2500000004 HC RX 250 GENERAL PHARMACY W/ HCPCS (ALT 636 FOR OP/ED): Performed by: NEUROLOGICAL SURGERY

## 2024-10-29 PROCEDURE — 0SG10A0 FUSION OF 2 OR MORE LUMBAR VERTEBRAL JOINTS WITH INTERBODY FUSION DEVICE, ANTERIOR APPROACH, ANTERIOR COLUMN, OPEN APPROACH: ICD-10-PCS | Performed by: STUDENT IN AN ORGANIZED HEALTH CARE EDUCATION/TRAINING PROGRAM

## 2024-10-29 PROCEDURE — 2500000001 HC RX 250 WO HCPCS SELF ADMINISTERED DRUGS (ALT 637 FOR MEDICARE OP): Performed by: STUDENT IN AN ORGANIZED HEALTH CARE EDUCATION/TRAINING PROGRAM

## 2024-10-29 PROCEDURE — 0SP004Z REMOVAL OF INTERNAL FIXATION DEVICE FROM LUMBAR VERTEBRAL JOINT, OPEN APPROACH: ICD-10-PCS | Performed by: STUDENT IN AN ORGANIZED HEALTH CARE EDUCATION/TRAINING PROGRAM

## 2024-10-29 PROCEDURE — 36415 COLL VENOUS BLD VENIPUNCTURE: CPT | Performed by: ANESTHESIOLOGY

## 2024-10-29 PROCEDURE — 3600000018 HC OR TIME - INITIAL BASE CHARGE - PROCEDURE LEVEL SIX: Performed by: STUDENT IN AN ORGANIZED HEALTH CARE EDUCATION/TRAINING PROGRAM

## 2024-10-29 PROCEDURE — 2500000001 HC RX 250 WO HCPCS SELF ADMINISTERED DRUGS (ALT 637 FOR MEDICARE OP): Performed by: NEUROLOGICAL SURGERY

## 2024-10-29 PROCEDURE — 22614 ARTHRD PST TQ 1NTRSPC EA ADD: CPT | Performed by: STUDENT IN AN ORGANIZED HEALTH CARE EDUCATION/TRAINING PROGRAM

## 2024-10-29 PROCEDURE — 86901 BLOOD TYPING SEROLOGIC RH(D): CPT | Performed by: ANESTHESIOLOGY

## 2024-10-29 PROCEDURE — 3600000017 HC OR TIME - EACH INCREMENTAL 1 MINUTE - PROCEDURE LEVEL SIX: Performed by: STUDENT IN AN ORGANIZED HEALTH CARE EDUCATION/TRAINING PROGRAM

## 2024-10-29 PROCEDURE — 36415 COLL VENOUS BLD VENIPUNCTURE: CPT

## 2024-10-29 PROCEDURE — C1713 ANCHOR/SCREW BN/BN,TIS/BN: HCPCS | Performed by: STUDENT IN AN ORGANIZED HEALTH CARE EDUCATION/TRAINING PROGRAM

## 2024-10-29 PROCEDURE — 3700000002 HC GENERAL ANESTHESIA TIME - EACH INCREMENTAL 1 MINUTE: Performed by: STUDENT IN AN ORGANIZED HEALTH CARE EDUCATION/TRAINING PROGRAM

## 2024-10-29 PROCEDURE — 7100000001 HC RECOVERY ROOM TIME - INITIAL BASE CHARGE: Performed by: STUDENT IN AN ORGANIZED HEALTH CARE EDUCATION/TRAINING PROGRAM

## 2024-10-29 PROCEDURE — 2500000004 HC RX 250 GENERAL PHARMACY W/ HCPCS (ALT 636 FOR OP/ED): Performed by: ANESTHESIOLOGY

## 2024-10-29 PROCEDURE — 22558 ARTHRD ANT NTRBD MIN DSC LUM: CPT | Performed by: STUDENT IN AN ORGANIZED HEALTH CARE EDUCATION/TRAINING PROGRAM

## 2024-10-29 PROCEDURE — 2720000007 HC OR 272 NO HCPCS: Performed by: STUDENT IN AN ORGANIZED HEALTH CARE EDUCATION/TRAINING PROGRAM

## 2024-10-29 PROCEDURE — 2500000005 HC RX 250 GENERAL PHARMACY W/O HCPCS: Performed by: STUDENT IN AN ORGANIZED HEALTH CARE EDUCATION/TRAINING PROGRAM

## 2024-10-29 PROCEDURE — 86922 COMPATIBILITY TEST ANTIGLOB: CPT

## 2024-10-29 PROCEDURE — 0SG1071 FUSION OF 2 OR MORE LUMBAR VERTEBRAL JOINTS WITH AUTOLOGOUS TISSUE SUBSTITUTE, POSTERIOR APPROACH, POSTERIOR COLUMN, OPEN APPROACH: ICD-10-PCS | Performed by: STUDENT IN AN ORGANIZED HEALTH CARE EDUCATION/TRAINING PROGRAM

## 2024-10-29 PROCEDURE — 22612 ARTHRD PST TQ 1NTRSPC LUMBAR: CPT | Performed by: STUDENT IN AN ORGANIZED HEALTH CARE EDUCATION/TRAINING PROGRAM

## 2024-10-29 DEVICE — SCREW, RELINE LOCK, 5.5MM OPEN TULIP: Type: IMPLANTABLE DEVICE | Site: SPINE LUMBAR | Status: FUNCTIONAL

## 2024-10-29 DEVICE — SCREW, RELINE-O, 6.5X40MM 2S POLYAXIAL: Type: IMPLANTABLE DEVICE | Site: SPINE LUMBAR | Status: FUNCTIONAL

## 2024-10-29 DEVICE — IMPLANTABLE DEVICE: Type: IMPLANTABLE DEVICE | Site: SPINE LUMBAR | Status: FUNCTIONAL

## 2024-10-29 DEVICE — SCREW, RELINE-O, 6.5X50MM 2S POLYAXIAL: Type: IMPLANTABLE DEVICE | Site: SPINE LUMBAR | Status: FUNCTIONAL

## 2024-10-29 DEVICE — SCREW, RELINE-O, 6.5X45MM 2S POLYAXIAL: Type: IMPLANTABLE DEVICE | Site: SPINE LUMBAR | Status: FUNCTIONAL

## 2024-10-29 RX ORDER — PHENYLEPHRINE HCL IN 0.9% NACL 1 MG/10 ML
SYRINGE (ML) INTRAVENOUS AS NEEDED
Status: DISCONTINUED | OUTPATIENT
Start: 2024-10-29 | End: 2024-10-29

## 2024-10-29 RX ORDER — TRANEXAMIC ACID 650 MG/1
1300 TABLET ORAL ONCE
Status: COMPLETED | OUTPATIENT
Start: 2024-10-29 | End: 2024-10-29

## 2024-10-29 RX ORDER — CEFAZOLIN 1 G/1
INJECTION, POWDER, FOR SOLUTION INTRAVENOUS AS NEEDED
Status: DISCONTINUED | OUTPATIENT
Start: 2024-10-29 | End: 2024-10-29

## 2024-10-29 RX ORDER — NYSTATIN 100000 [USP'U]/G
1 POWDER TOPICAL 2 TIMES DAILY
Status: DISCONTINUED | OUTPATIENT
Start: 2024-10-29 | End: 2024-11-01 | Stop reason: HOSPADM

## 2024-10-29 RX ORDER — GLYCOPYRROLATE 0.2 MG/ML
INJECTION INTRAMUSCULAR; INTRAVENOUS AS NEEDED
Status: DISCONTINUED | OUTPATIENT
Start: 2024-10-29 | End: 2024-10-29

## 2024-10-29 RX ORDER — TRANEXAMIC ACID 650 MG/1
TABLET ORAL AS NEEDED
Status: DISCONTINUED | OUTPATIENT
Start: 2024-10-29 | End: 2024-10-29

## 2024-10-29 RX ORDER — FENTANYL CITRATE 50 UG/ML
INJECTION, SOLUTION INTRAMUSCULAR; INTRAVENOUS AS NEEDED
Status: DISCONTINUED | OUTPATIENT
Start: 2024-10-29 | End: 2024-10-29

## 2024-10-29 RX ORDER — OXYCODONE HYDROCHLORIDE 5 MG/1
2.5 TABLET ORAL EVERY 4 HOURS PRN
Status: DISCONTINUED | OUTPATIENT
Start: 2024-10-29 | End: 2024-11-01 | Stop reason: HOSPADM

## 2024-10-29 RX ORDER — SODIUM CHLORIDE, SODIUM LACTATE, POTASSIUM CHLORIDE, CALCIUM CHLORIDE 600; 310; 30; 20 MG/100ML; MG/100ML; MG/100ML; MG/100ML
100 INJECTION, SOLUTION INTRAVENOUS CONTINUOUS
Status: DISCONTINUED | OUTPATIENT
Start: 2024-10-29 | End: 2024-10-29 | Stop reason: HOSPADM

## 2024-10-29 RX ORDER — METHOCARBAMOL 100 MG/ML
1000 INJECTION, SOLUTION INTRAMUSCULAR; INTRAVENOUS ONCE
Status: COMPLETED | OUTPATIENT
Start: 2024-10-29 | End: 2024-10-29

## 2024-10-29 RX ORDER — ACETAMINOPHEN 325 MG/1
650 TABLET ORAL EVERY 6 HOURS
Status: DISCONTINUED | OUTPATIENT
Start: 2024-10-29 | End: 2024-11-01 | Stop reason: HOSPADM

## 2024-10-29 RX ORDER — SODIUM CHLORIDE, SODIUM LACTATE, POTASSIUM CHLORIDE, CALCIUM CHLORIDE 600; 310; 30; 20 MG/100ML; MG/100ML; MG/100ML; MG/100ML
20 INJECTION, SOLUTION INTRAVENOUS CONTINUOUS
Status: DISCONTINUED | OUTPATIENT
Start: 2024-10-29 | End: 2024-10-30

## 2024-10-29 RX ORDER — ONDANSETRON HYDROCHLORIDE 2 MG/ML
4 INJECTION, SOLUTION INTRAVENOUS ONCE AS NEEDED
Status: COMPLETED | OUTPATIENT
Start: 2024-10-29 | End: 2024-10-29

## 2024-10-29 RX ORDER — NALOXONE HYDROCHLORIDE 0.4 MG/ML
0.2 INJECTION, SOLUTION INTRAMUSCULAR; INTRAVENOUS; SUBCUTANEOUS EVERY 5 MIN PRN
Status: DISCONTINUED | OUTPATIENT
Start: 2024-10-29 | End: 2024-11-01 | Stop reason: HOSPADM

## 2024-10-29 RX ORDER — PANTOPRAZOLE SODIUM 40 MG/1
40 TABLET, DELAYED RELEASE ORAL
Status: DISCONTINUED | OUTPATIENT
Start: 2024-10-30 | End: 2024-11-01 | Stop reason: HOSPADM

## 2024-10-29 RX ORDER — CELECOXIB 200 MG/1
400 CAPSULE ORAL ONCE
Status: COMPLETED | OUTPATIENT
Start: 2024-10-29 | End: 2024-10-29

## 2024-10-29 RX ORDER — ACETAMINOPHEN 325 MG/1
975 TABLET ORAL ONCE
Status: COMPLETED | OUTPATIENT
Start: 2024-10-29 | End: 2024-10-29

## 2024-10-29 RX ORDER — LIDOCAINE HYDROCHLORIDE AND EPINEPHRINE 5; 5 MG/ML; UG/ML
INJECTION, SOLUTION INFILTRATION; PERINEURAL AS NEEDED
Status: DISCONTINUED | OUTPATIENT
Start: 2024-10-29 | End: 2024-10-29 | Stop reason: HOSPADM

## 2024-10-29 RX ORDER — DEXTROSE 50 % IN WATER (D50W) INTRAVENOUS SYRINGE
25
Status: DISCONTINUED | OUTPATIENT
Start: 2024-10-29 | End: 2024-11-01 | Stop reason: HOSPADM

## 2024-10-29 RX ORDER — ALBUMIN HUMAN 50 G/1000ML
SOLUTION INTRAVENOUS AS NEEDED
Status: DISCONTINUED | OUTPATIENT
Start: 2024-10-29 | End: 2024-10-29

## 2024-10-29 RX ORDER — LABETALOL HYDROCHLORIDE 5 MG/ML
5 INJECTION, SOLUTION INTRAVENOUS ONCE AS NEEDED
Status: DISCONTINUED | OUTPATIENT
Start: 2024-10-29 | End: 2024-10-29 | Stop reason: HOSPADM

## 2024-10-29 RX ORDER — ASCORBIC ACID 500 MG
500 TABLET ORAL DAILY
Status: DISCONTINUED | OUTPATIENT
Start: 2024-10-29 | End: 2024-11-01 | Stop reason: HOSPADM

## 2024-10-29 RX ORDER — DEXTROSE 50 % IN WATER (D50W) INTRAVENOUS SYRINGE
12.5
Status: DISCONTINUED | OUTPATIENT
Start: 2024-10-29 | End: 2024-11-01 | Stop reason: HOSPADM

## 2024-10-29 RX ORDER — OXYCODONE HYDROCHLORIDE 5 MG/1
10 TABLET ORAL EVERY 4 HOURS PRN
Status: DISCONTINUED | OUTPATIENT
Start: 2024-10-29 | End: 2024-11-01 | Stop reason: HOSPADM

## 2024-10-29 RX ORDER — HYDROMORPHONE HYDROCHLORIDE 1 MG/ML
0.1 INJECTION, SOLUTION INTRAMUSCULAR; INTRAVENOUS; SUBCUTANEOUS EVERY 5 MIN PRN
Status: DISCONTINUED | OUTPATIENT
Start: 2024-10-29 | End: 2024-10-29 | Stop reason: HOSPADM

## 2024-10-29 RX ORDER — PROPOFOL 10 MG/ML
INJECTION, EMULSION INTRAVENOUS AS NEEDED
Status: DISCONTINUED | OUTPATIENT
Start: 2024-10-29 | End: 2024-10-29

## 2024-10-29 RX ORDER — SODIUM CHLORIDE, SODIUM LACTATE, POTASSIUM CHLORIDE, CALCIUM CHLORIDE 600; 310; 30; 20 MG/100ML; MG/100ML; MG/100ML; MG/100ML
100 INJECTION, SOLUTION INTRAVENOUS CONTINUOUS
Status: DISCONTINUED | OUTPATIENT
Start: 2024-10-29 | End: 2024-10-30

## 2024-10-29 RX ORDER — PHENYLEPHRINE 10 MG/250 ML(40 MCG/ML)IN 0.9 % SOD.CHLORIDE INTRAVENOUS
CONTINUOUS PRN
Status: DISCONTINUED | OUTPATIENT
Start: 2024-10-29 | End: 2024-10-29

## 2024-10-29 RX ORDER — HYDROMORPHONE HYDROCHLORIDE 1 MG/ML
INJECTION, SOLUTION INTRAMUSCULAR; INTRAVENOUS; SUBCUTANEOUS AS NEEDED
Status: DISCONTINUED | OUTPATIENT
Start: 2024-10-29 | End: 2024-10-29

## 2024-10-29 RX ORDER — LIDOCAINE HYDROCHLORIDE 10 MG/ML
0.1 INJECTION, SOLUTION INFILTRATION; PERINEURAL ONCE
Status: DISCONTINUED | OUTPATIENT
Start: 2024-10-29 | End: 2024-10-29 | Stop reason: HOSPADM

## 2024-10-29 RX ORDER — MIDAZOLAM HYDROCHLORIDE 1 MG/ML
INJECTION INTRAMUSCULAR; INTRAVENOUS AS NEEDED
Status: DISCONTINUED | OUTPATIENT
Start: 2024-10-29 | End: 2024-10-29

## 2024-10-29 RX ORDER — OXYCODONE HYDROCHLORIDE 5 MG/1
5 TABLET ORAL EVERY 4 HOURS PRN
Status: DISCONTINUED | OUTPATIENT
Start: 2024-10-29 | End: 2024-11-01 | Stop reason: HOSPADM

## 2024-10-29 RX ORDER — BISACODYL 5 MG
10 TABLET, DELAYED RELEASE (ENTERIC COATED) ORAL DAILY PRN
Status: DISCONTINUED | OUTPATIENT
Start: 2024-10-29 | End: 2024-11-01 | Stop reason: HOSPADM

## 2024-10-29 RX ORDER — HYDROMORPHONE HYDROCHLORIDE 1 MG/ML
0.5 INJECTION, SOLUTION INTRAMUSCULAR; INTRAVENOUS; SUBCUTANEOUS EVERY 5 MIN PRN
Status: DISCONTINUED | OUTPATIENT
Start: 2024-10-29 | End: 2024-10-29 | Stop reason: HOSPADM

## 2024-10-29 RX ORDER — ESCITALOPRAM OXALATE 10 MG/1
20 TABLET ORAL DAILY
Status: DISCONTINUED | OUTPATIENT
Start: 2024-10-30 | End: 2024-11-01 | Stop reason: HOSPADM

## 2024-10-29 RX ORDER — ATORVASTATIN CALCIUM 40 MG/1
40 TABLET, FILM COATED ORAL DAILY
Status: DISCONTINUED | OUTPATIENT
Start: 2024-10-29 | End: 2024-11-01 | Stop reason: HOSPADM

## 2024-10-29 RX ORDER — ENOXAPARIN SODIUM 100 MG/ML
40 INJECTION SUBCUTANEOUS EVERY 24 HOURS
Status: DISCONTINUED | OUTPATIENT
Start: 2024-10-30 | End: 2024-11-01 | Stop reason: HOSPADM

## 2024-10-29 RX ORDER — ONDANSETRON HYDROCHLORIDE 2 MG/ML
INJECTION, SOLUTION INTRAVENOUS AS NEEDED
Status: DISCONTINUED | OUTPATIENT
Start: 2024-10-29 | End: 2024-10-29

## 2024-10-29 RX ORDER — MEPERIDINE HYDROCHLORIDE 25 MG/ML
12.5 INJECTION INTRAMUSCULAR; INTRAVENOUS; SUBCUTANEOUS EVERY 10 MIN PRN
Status: DISCONTINUED | OUTPATIENT
Start: 2024-10-29 | End: 2024-10-29 | Stop reason: HOSPADM

## 2024-10-29 RX ORDER — CYCLOBENZAPRINE HCL 10 MG
5 TABLET ORAL 3 TIMES DAILY
Status: DISCONTINUED | OUTPATIENT
Start: 2024-10-29 | End: 2024-11-01 | Stop reason: HOSPADM

## 2024-10-29 RX ORDER — SODIUM CHLORIDE, SODIUM LACTATE, POTASSIUM CHLORIDE, CALCIUM CHLORIDE 600; 310; 30; 20 MG/100ML; MG/100ML; MG/100ML; MG/100ML
INJECTION, SOLUTION INTRAVENOUS CONTINUOUS PRN
Status: DISCONTINUED | OUTPATIENT
Start: 2024-10-29 | End: 2024-10-29

## 2024-10-29 RX ORDER — HYDROMORPHONE HYDROCHLORIDE 1 MG/ML
0.2 INJECTION, SOLUTION INTRAMUSCULAR; INTRAVENOUS; SUBCUTANEOUS EVERY 4 HOURS PRN
Status: DISCONTINUED | OUTPATIENT
Start: 2024-10-29 | End: 2024-11-01 | Stop reason: HOSPADM

## 2024-10-29 RX ORDER — HYDROMORPHONE HYDROCHLORIDE 1 MG/ML
0.2 INJECTION, SOLUTION INTRAMUSCULAR; INTRAVENOUS; SUBCUTANEOUS EVERY 5 MIN PRN
Status: DISCONTINUED | OUTPATIENT
Start: 2024-10-29 | End: 2024-10-29 | Stop reason: HOSPADM

## 2024-10-29 RX ORDER — ONDANSETRON 4 MG/1
4 TABLET, FILM COATED ORAL EVERY 8 HOURS PRN
Status: DISCONTINUED | OUTPATIENT
Start: 2024-10-29 | End: 2024-11-01 | Stop reason: HOSPADM

## 2024-10-29 RX ORDER — ESMOLOL HYDROCHLORIDE 10 MG/ML
INJECTION INTRAVENOUS AS NEEDED
Status: DISCONTINUED | OUTPATIENT
Start: 2024-10-29 | End: 2024-10-29

## 2024-10-29 RX ORDER — ACETAMINOPHEN 325 MG/1
TABLET ORAL AS NEEDED
Status: DISCONTINUED | OUTPATIENT
Start: 2024-10-29 | End: 2024-10-29

## 2024-10-29 RX ORDER — ROCURONIUM BROMIDE 10 MG/ML
INJECTION, SOLUTION INTRAVENOUS AS NEEDED
Status: DISCONTINUED | OUTPATIENT
Start: 2024-10-29 | End: 2024-10-29

## 2024-10-29 RX ORDER — LIDOCAINE HYDROCHLORIDE 20 MG/ML
INJECTION, SOLUTION INFILTRATION; PERINEURAL AS NEEDED
Status: DISCONTINUED | OUTPATIENT
Start: 2024-10-29 | End: 2024-10-29

## 2024-10-29 RX ORDER — ONDANSETRON HYDROCHLORIDE 2 MG/ML
4 INJECTION, SOLUTION INTRAVENOUS EVERY 8 HOURS PRN
Status: DISCONTINUED | OUTPATIENT
Start: 2024-10-29 | End: 2024-11-01 | Stop reason: HOSPADM

## 2024-10-29 RX ORDER — CHOLECALCIFEROL (VITAMIN D3) 25 MCG
2000 TABLET ORAL DAILY
Status: DISCONTINUED | OUTPATIENT
Start: 2024-10-30 | End: 2024-11-01 | Stop reason: HOSPADM

## 2024-10-29 RX ORDER — POLYETHYLENE GLYCOL 3350 17 G/17G
17 POWDER, FOR SOLUTION ORAL 2 TIMES DAILY
Status: DISCONTINUED | OUTPATIENT
Start: 2024-10-29 | End: 2024-11-01 | Stop reason: HOSPADM

## 2024-10-29 RX ORDER — CYCLOBENZAPRINE HCL 10 MG
10 TABLET ORAL 3 TIMES DAILY PRN
Status: DISCONTINUED | OUTPATIENT
Start: 2024-10-29 | End: 2024-11-01 | Stop reason: HOSPADM

## 2024-10-29 SDOH — SOCIAL STABILITY: SOCIAL INSECURITY
WITHIN THE LAST YEAR, HAVE YOU BEEN RAPED OR FORCED TO HAVE ANY KIND OF SEXUAL ACTIVITY BY YOUR PARTNER OR EX-PARTNER?: NO

## 2024-10-29 SDOH — ECONOMIC STABILITY: INCOME INSECURITY: IN THE PAST 12 MONTHS HAS THE ELECTRIC, GAS, OIL, OR WATER COMPANY THREATENED TO SHUT OFF SERVICES IN YOUR HOME?: NO

## 2024-10-29 SDOH — SOCIAL STABILITY: SOCIAL INSECURITY: DOES ANYONE TRY TO KEEP YOU FROM HAVING/CONTACTING OTHER FRIENDS OR DOING THINGS OUTSIDE YOUR HOME?: NO

## 2024-10-29 SDOH — HEALTH STABILITY: MENTAL HEALTH: CURRENT SMOKER: 0

## 2024-10-29 SDOH — SOCIAL STABILITY: SOCIAL INSECURITY: ABUSE: ADULT

## 2024-10-29 SDOH — SOCIAL STABILITY: SOCIAL INSECURITY: WITHIN THE LAST YEAR, HAVE YOU BEEN HUMILIATED OR EMOTIONALLY ABUSED IN OTHER WAYS BY YOUR PARTNER OR EX-PARTNER?: NO

## 2024-10-29 SDOH — SOCIAL STABILITY: SOCIAL INSECURITY
WITHIN THE LAST YEAR, HAVE YOU BEEN KICKED, HIT, SLAPPED, OR OTHERWISE PHYSICALLY HURT BY YOUR PARTNER OR EX-PARTNER?: NO

## 2024-10-29 SDOH — SOCIAL STABILITY: SOCIAL INSECURITY: ARE THERE ANY APPARENT SIGNS OF INJURIES/BEHAVIORS THAT COULD BE RELATED TO ABUSE/NEGLECT?: NO

## 2024-10-29 SDOH — ECONOMIC STABILITY: FOOD INSECURITY: WITHIN THE PAST 12 MONTHS, THE FOOD YOU BOUGHT JUST DIDN'T LAST AND YOU DIDN'T HAVE MONEY TO GET MORE.: NEVER TRUE

## 2024-10-29 SDOH — ECONOMIC STABILITY: FOOD INSECURITY: WITHIN THE PAST 12 MONTHS, YOU WORRIED THAT YOUR FOOD WOULD RUN OUT BEFORE YOU GOT THE MONEY TO BUY MORE.: NEVER TRUE

## 2024-10-29 SDOH — SOCIAL STABILITY: SOCIAL INSECURITY: WITHIN THE LAST YEAR, HAVE YOU BEEN AFRAID OF YOUR PARTNER OR EX-PARTNER?: NO

## 2024-10-29 SDOH — SOCIAL STABILITY: SOCIAL INSECURITY: HAS ANYONE EVER THREATENED TO HURT YOUR FAMILY OR YOUR PETS?: NO

## 2024-10-29 SDOH — SOCIAL STABILITY: SOCIAL INSECURITY: ARE YOU OR HAVE YOU BEEN THREATENED OR ABUSED PHYSICALLY, EMOTIONALLY, OR SEXUALLY BY ANYONE?: NO

## 2024-10-29 SDOH — SOCIAL STABILITY: SOCIAL INSECURITY: DO YOU FEEL UNSAFE GOING BACK TO THE PLACE WHERE YOU ARE LIVING?: NO

## 2024-10-29 SDOH — SOCIAL STABILITY: SOCIAL INSECURITY: DO YOU FEEL ANYONE HAS EXPLOITED OR TAKEN ADVANTAGE OF YOU FINANCIALLY OR OF YOUR PERSONAL PROPERTY?: NO

## 2024-10-29 SDOH — SOCIAL STABILITY: SOCIAL INSECURITY: HAVE YOU HAD THOUGHTS OF HARMING ANYONE ELSE?: NO

## 2024-10-29 ASSESSMENT — ACTIVITIES OF DAILY LIVING (ADL)
HEARING - LEFT EAR: FUNCTIONAL
LACK_OF_TRANSPORTATION: NO
PATIENT'S MEMORY ADEQUATE TO SAFELY COMPLETE DAILY ACTIVITIES?: YES
ADEQUATE_TO_COMPLETE_ADL: YES
WALKS IN HOME: INDEPENDENT
JUDGMENT_ADEQUATE_SAFELY_COMPLETE_DAILY_ACTIVITIES: YES
HEARING - RIGHT EAR: FUNCTIONAL
BATHING: INDEPENDENT
LACK_OF_TRANSPORTATION: NO
TOILETING: INDEPENDENT
DRESSING YOURSELF: INDEPENDENT
ASSISTIVE_DEVICE: WALKER
FEEDING YOURSELF: INDEPENDENT
GROOMING: INDEPENDENT

## 2024-10-29 ASSESSMENT — PAIN - FUNCTIONAL ASSESSMENT
PAIN_FUNCTIONAL_ASSESSMENT: UNABLE TO SELF-REPORT
PAIN_FUNCTIONAL_ASSESSMENT: UNABLE TO SELF-REPORT
PAIN_FUNCTIONAL_ASSESSMENT: 0-10
PAIN_FUNCTIONAL_ASSESSMENT: UNABLE TO SELF-REPORT
PAIN_FUNCTIONAL_ASSESSMENT: 0-10
PAIN_FUNCTIONAL_ASSESSMENT: 0-10
PAIN_FUNCTIONAL_ASSESSMENT: UNABLE TO SELF-REPORT
PAIN_FUNCTIONAL_ASSESSMENT: 0-10
PAIN_FUNCTIONAL_ASSESSMENT: 0-10
PAIN_FUNCTIONAL_ASSESSMENT: UNABLE TO SELF-REPORT
PAIN_FUNCTIONAL_ASSESSMENT: 0-10

## 2024-10-29 ASSESSMENT — PAIN SCALES - GENERAL
PAINLEVEL_OUTOF10: 0 - NO PAIN
PAINLEVEL_OUTOF10: 6
PAINLEVEL_OUTOF10: 7
PAINLEVEL_OUTOF10: 5 - MODERATE PAIN
PAINLEVEL_OUTOF10: 6
PAINLEVEL_OUTOF10: 5 - MODERATE PAIN
PAINLEVEL_OUTOF10: 8
PAINLEVEL_OUTOF10: 6
PAINLEVEL_OUTOF10: 7
PAINLEVEL_OUTOF10: 4
PAINLEVEL_OUTOF10: 9
PAINLEVEL_OUTOF10: 8
PAINLEVEL_OUTOF10: 7
PAINLEVEL_OUTOF10: 7

## 2024-10-29 ASSESSMENT — PAIN DESCRIPTION - LOCATION
LOCATION: BACK

## 2024-10-29 ASSESSMENT — COGNITIVE AND FUNCTIONAL STATUS - GENERAL
TURNING FROM BACK TO SIDE WHILE IN FLAT BAD: A LOT
MOVING TO AND FROM BED TO CHAIR: A LOT
DRESSING REGULAR LOWER BODY CLOTHING: A LOT
TOILETING: TOTAL
PERSONAL GROOMING: A LITTLE
STANDING UP FROM CHAIR USING ARMS: A LOT
DRESSING REGULAR UPPER BODY CLOTHING: A LOT
WALKING IN HOSPITAL ROOM: A LOT
DAILY ACTIVITIY SCORE: 14
MOBILITY SCORE: 12
HELP NEEDED FOR BATHING: A LOT
CLIMB 3 TO 5 STEPS WITH RAILING: A LOT
MOVING FROM LYING ON BACK TO SITTING ON SIDE OF FLAT BED WITH BEDRAILS: A LOT

## 2024-10-29 ASSESSMENT — COLUMBIA-SUICIDE SEVERITY RATING SCALE - C-SSRS
1. IN THE PAST MONTH, HAVE YOU WISHED YOU WERE DEAD OR WISHED YOU COULD GO TO SLEEP AND NOT WAKE UP?: NO
2. HAVE YOU ACTUALLY HAD ANY THOUGHTS OF KILLING YOURSELF?: NO
6. HAVE YOU EVER DONE ANYTHING, STARTED TO DO ANYTHING, OR PREPARED TO DO ANYTHING TO END YOUR LIFE?: NO

## 2024-10-29 ASSESSMENT — PATIENT HEALTH QUESTIONNAIRE - PHQ9
2. FEELING DOWN, DEPRESSED OR HOPELESS: NOT AT ALL
SUM OF ALL RESPONSES TO PHQ9 QUESTIONS 1 & 2: 0
1. LITTLE INTEREST OR PLEASURE IN DOING THINGS: NOT AT ALL

## 2024-10-29 ASSESSMENT — LIFESTYLE VARIABLES
HOW OFTEN DO YOU HAVE A DRINK CONTAINING ALCOHOL: NEVER
HOW MANY STANDARD DRINKS CONTAINING ALCOHOL DO YOU HAVE ON A TYPICAL DAY: PATIENT DOES NOT DRINK
HOW OFTEN DO YOU HAVE 6 OR MORE DRINKS ON ONE OCCASION: NEVER
AUDIT-C TOTAL SCORE: 0
AUDIT-C TOTAL SCORE: 0
SKIP TO QUESTIONS 9-10: 1

## 2024-10-30 ENCOUNTER — APPOINTMENT (OUTPATIENT)
Dept: RADIOLOGY | Facility: HOSPITAL | Age: 74
End: 2024-10-30
Payer: MEDICARE

## 2024-10-30 ENCOUNTER — HOME HEALTH ADMISSION (OUTPATIENT)
Dept: HOME HEALTH SERVICES | Facility: HOME HEALTH | Age: 74
End: 2024-10-30
Payer: MEDICARE

## 2024-10-30 LAB
ANION GAP SERPL CALC-SCNC: 16 MMOL/L (ref 10–20)
BUN SERPL-MCNC: 26 MG/DL (ref 6–23)
CALCIUM SERPL-MCNC: 8.4 MG/DL (ref 8.6–10.6)
CHLORIDE SERPL-SCNC: 98 MMOL/L (ref 98–107)
CO2 SERPL-SCNC: 26 MMOL/L (ref 21–32)
CREAT SERPL-MCNC: 0.84 MG/DL (ref 0.5–1.05)
EGFRCR SERPLBLD CKD-EPI 2021: 73 ML/MIN/1.73M*2
ERYTHROCYTE [DISTWIDTH] IN BLOOD BY AUTOMATED COUNT: 12.6 % (ref 11.5–14.5)
GLUCOSE SERPL-MCNC: 115 MG/DL (ref 74–99)
HCT VFR BLD AUTO: 28.3 % (ref 36–46)
HGB BLD-MCNC: 9.4 G/DL (ref 12–16)
MCH RBC QN AUTO: 31.2 PG (ref 26–34)
MCHC RBC AUTO-ENTMCNC: 33.2 G/DL (ref 32–36)
MCV RBC AUTO: 94 FL (ref 80–100)
NRBC BLD-RTO: 0 /100 WBCS (ref 0–0)
PLATELET # BLD AUTO: 252 X10*3/UL (ref 150–450)
POTASSIUM SERPL-SCNC: 4.6 MMOL/L (ref 3.5–5.3)
RBC # BLD AUTO: 3.01 X10*6/UL (ref 4–5.2)
SODIUM SERPL-SCNC: 135 MMOL/L (ref 136–145)
WBC # BLD AUTO: 11.9 X10*3/UL (ref 4.4–11.3)

## 2024-10-30 PROCEDURE — 2500000001 HC RX 250 WO HCPCS SELF ADMINISTERED DRUGS (ALT 637 FOR MEDICARE OP): Performed by: NEUROLOGICAL SURGERY

## 2024-10-30 PROCEDURE — 36415 COLL VENOUS BLD VENIPUNCTURE: CPT | Performed by: NEUROLOGICAL SURGERY

## 2024-10-30 PROCEDURE — 97165 OT EVAL LOW COMPLEX 30 MIN: CPT | Mod: GO

## 2024-10-30 PROCEDURE — 72100 X-RAY EXAM L-S SPINE 2/3 VWS: CPT

## 2024-10-30 PROCEDURE — 72082 X-RAY EXAM ENTIRE SPI 2/3 VW: CPT | Performed by: STUDENT IN AN ORGANIZED HEALTH CARE EDUCATION/TRAINING PROGRAM

## 2024-10-30 PROCEDURE — 1100000001 HC PRIVATE ROOM DAILY

## 2024-10-30 PROCEDURE — 85027 COMPLETE CBC AUTOMATED: CPT | Performed by: NEUROLOGICAL SURGERY

## 2024-10-30 PROCEDURE — 80048 BASIC METABOLIC PNL TOTAL CA: CPT | Performed by: NEUROLOGICAL SURGERY

## 2024-10-30 PROCEDURE — 2500000004 HC RX 250 GENERAL PHARMACY W/ HCPCS (ALT 636 FOR OP/ED): Performed by: NEUROLOGICAL SURGERY

## 2024-10-30 PROCEDURE — 72082 X-RAY EXAM ENTIRE SPI 2/3 VW: CPT

## 2024-10-30 PROCEDURE — 97161 PT EVAL LOW COMPLEX 20 MIN: CPT | Mod: GP

## 2024-10-30 PROCEDURE — 72100 X-RAY EXAM L-S SPINE 2/3 VWS: CPT | Performed by: STUDENT IN AN ORGANIZED HEALTH CARE EDUCATION/TRAINING PROGRAM

## 2024-10-30 ASSESSMENT — COGNITIVE AND FUNCTIONAL STATUS - GENERAL
CLIMB 3 TO 5 STEPS WITH RAILING: A LOT
MOVING FROM LYING ON BACK TO SITTING ON SIDE OF FLAT BED WITH BEDRAILS: A LITTLE
MOVING FROM LYING ON BACK TO SITTING ON SIDE OF FLAT BED WITH BEDRAILS: A LITTLE
CLIMB 3 TO 5 STEPS WITH RAILING: A LOT
STANDING UP FROM CHAIR USING ARMS: A LITTLE
MOVING TO AND FROM BED TO CHAIR: A LITTLE
MOVING TO AND FROM BED TO CHAIR: A LITTLE
TOILETING: A LITTLE
DAILY ACTIVITIY SCORE: 19
TOILETING: A LITTLE
STANDING UP FROM CHAIR USING ARMS: A LITTLE
MOVING FROM LYING ON BACK TO SITTING ON SIDE OF FLAT BED WITH BEDRAILS: A LITTLE
STANDING UP FROM CHAIR USING ARMS: A LITTLE
PERSONAL GROOMING: A LITTLE
DRESSING REGULAR LOWER BODY CLOTHING: A LOT
DAILY ACTIVITIY SCORE: 18
TOILETING: A LITTLE
DRESSING REGULAR LOWER BODY CLOTHING: A LOT
WALKING IN HOSPITAL ROOM: A LITTLE
TURNING FROM BACK TO SIDE WHILE IN FLAT BAD: A LITTLE
DRESSING REGULAR LOWER BODY CLOTHING: A LITTLE
DRESSING REGULAR UPPER BODY CLOTHING: A LITTLE
HELP NEEDED FOR BATHING: A LOT
HELP NEEDED FOR BATHING: A LOT
TURNING FROM BACK TO SIDE WHILE IN FLAT BAD: A LITTLE
MOBILITY SCORE: 17
MOBILITY SCORE: 18
DAILY ACTIVITIY SCORE: 18
MOVING TO AND FROM BED TO CHAIR: A LITTLE
CLIMB 3 TO 5 STEPS WITH RAILING: A LITTLE
DRESSING REGULAR UPPER BODY CLOTHING: A LITTLE
DRESSING REGULAR UPPER BODY CLOTHING: A LITTLE
HELP NEEDED FOR BATHING: A LITTLE
WALKING IN HOSPITAL ROOM: A LITTLE
MOBILITY SCORE: 17
WALKING IN HOSPITAL ROOM: A LITTLE
TURNING FROM BACK TO SIDE WHILE IN FLAT BAD: A LITTLE

## 2024-10-30 ASSESSMENT — PAIN SCALES - GENERAL
PAINLEVEL_OUTOF10: 8
PAINLEVEL_OUTOF10: 5 - MODERATE PAIN
PAINLEVEL_OUTOF10: 10 - WORST POSSIBLE PAIN
PAINLEVEL_OUTOF10: 9
PAINLEVEL_OUTOF10: 9
PAINLEVEL_OUTOF10: 0 - NO PAIN
PAINLEVEL_OUTOF10: 8
PAINLEVEL_OUTOF10: 10 - WORST POSSIBLE PAIN
PAINLEVEL_OUTOF10: 5 - MODERATE PAIN
PAINLEVEL_OUTOF10: 5 - MODERATE PAIN
PAINLEVEL_OUTOF10: 6
PAINLEVEL_OUTOF10: 0 - NO PAIN
PAINLEVEL_OUTOF10: 10 - WORST POSSIBLE PAIN
PAINLEVEL_OUTOF10: 8

## 2024-10-30 ASSESSMENT — PAIN - FUNCTIONAL ASSESSMENT
PAIN_FUNCTIONAL_ASSESSMENT: 0-10

## 2024-10-30 ASSESSMENT — ACTIVITIES OF DAILY LIVING (ADL)
ADL_ASSISTANCE: INDEPENDENT
LACK_OF_TRANSPORTATION: NO
ADL_ASSISTANCE: INDEPENDENT
BATHING_ASSISTANCE: MINIMAL

## 2024-10-31 ENCOUNTER — DOCUMENTATION (OUTPATIENT)
Dept: HOME HEALTH SERVICES | Facility: HOME HEALTH | Age: 74
End: 2024-10-31
Payer: MEDICARE

## 2024-10-31 LAB
ANION GAP SERPL CALC-SCNC: 11 MMOL/L (ref 10–20)
BUN SERPL-MCNC: 28 MG/DL (ref 6–23)
CALCIUM SERPL-MCNC: 9 MG/DL (ref 8.6–10.6)
CHLORIDE SERPL-SCNC: 96 MMOL/L (ref 98–107)
CO2 SERPL-SCNC: 28 MMOL/L (ref 21–32)
CREAT SERPL-MCNC: 0.91 MG/DL (ref 0.5–1.05)
EGFRCR SERPLBLD CKD-EPI 2021: 67 ML/MIN/1.73M*2
ERYTHROCYTE [DISTWIDTH] IN BLOOD BY AUTOMATED COUNT: 12.7 % (ref 11.5–14.5)
GLUCOSE SERPL-MCNC: 106 MG/DL (ref 74–99)
HCT VFR BLD AUTO: 29.7 % (ref 36–46)
HGB BLD-MCNC: 9.7 G/DL (ref 12–16)
MCH RBC QN AUTO: 30.8 PG (ref 26–34)
MCHC RBC AUTO-ENTMCNC: 32.7 G/DL (ref 32–36)
MCV RBC AUTO: 94 FL (ref 80–100)
NRBC BLD-RTO: 0 /100 WBCS (ref 0–0)
PLATELET # BLD AUTO: 238 X10*3/UL (ref 150–450)
POTASSIUM SERPL-SCNC: 4.4 MMOL/L (ref 3.5–5.3)
RBC # BLD AUTO: 3.15 X10*6/UL (ref 4–5.2)
SODIUM SERPL-SCNC: 131 MMOL/L (ref 136–145)
WBC # BLD AUTO: 11.8 X10*3/UL (ref 4.4–11.3)

## 2024-10-31 PROCEDURE — 97530 THERAPEUTIC ACTIVITIES: CPT | Mod: GP,CQ

## 2024-10-31 PROCEDURE — 97110 THERAPEUTIC EXERCISES: CPT | Mod: GP,CQ

## 2024-10-31 PROCEDURE — 97116 GAIT TRAINING THERAPY: CPT | Mod: GP,CQ

## 2024-10-31 PROCEDURE — 1100000001 HC PRIVATE ROOM DAILY

## 2024-10-31 PROCEDURE — 36415 COLL VENOUS BLD VENIPUNCTURE: CPT | Performed by: NEUROLOGICAL SURGERY

## 2024-10-31 PROCEDURE — 2500000004 HC RX 250 GENERAL PHARMACY W/ HCPCS (ALT 636 FOR OP/ED)

## 2024-10-31 PROCEDURE — 85027 COMPLETE CBC AUTOMATED: CPT | Performed by: NEUROLOGICAL SURGERY

## 2024-10-31 PROCEDURE — 80048 BASIC METABOLIC PNL TOTAL CA: CPT | Performed by: NEUROLOGICAL SURGERY

## 2024-10-31 PROCEDURE — 2500000001 HC RX 250 WO HCPCS SELF ADMINISTERED DRUGS (ALT 637 FOR MEDICARE OP): Performed by: NEUROLOGICAL SURGERY

## 2024-10-31 PROCEDURE — 2500000004 HC RX 250 GENERAL PHARMACY W/ HCPCS (ALT 636 FOR OP/ED): Performed by: NEUROLOGICAL SURGERY

## 2024-10-31 RX ORDER — MELOXICAM 15 MG/1
15 TABLET ORAL DAILY
Start: 2024-10-31

## 2024-10-31 RX ORDER — OXYCODONE HYDROCHLORIDE 5 MG/1
5 TABLET ORAL EVERY 6 HOURS PRN
Qty: 28 TABLET | Refills: 0 | Status: SHIPPED | OUTPATIENT
Start: 2024-10-31

## 2024-10-31 RX ORDER — POLYETHYLENE GLYCOL 3350 17 G/17G
17 POWDER, FOR SOLUTION ORAL 2 TIMES DAILY
Qty: 14 PACKET | Refills: 0 | Status: SHIPPED | OUTPATIENT
Start: 2024-10-31

## 2024-10-31 RX ORDER — ACETAMINOPHEN 325 MG/1
650 TABLET ORAL EVERY 6 HOURS PRN
Start: 2024-10-31

## 2024-10-31 RX ORDER — CYCLOBENZAPRINE HCL 5 MG
5 TABLET ORAL 3 TIMES DAILY PRN
Qty: 21 TABLET | Refills: 0 | Status: SHIPPED | OUTPATIENT
Start: 2024-10-31

## 2024-10-31 ASSESSMENT — PAIN SCALES - GENERAL
PAINLEVEL_OUTOF10: 7
PAINLEVEL_OUTOF10: 8
PAINLEVEL_OUTOF10: 6
PAINLEVEL_OUTOF10: 8
PAINLEVEL_OUTOF10: 6
PAINLEVEL_OUTOF10: 6
PAINLEVEL_OUTOF10: 7
PAINLEVEL_OUTOF10: 8

## 2024-10-31 ASSESSMENT — COGNITIVE AND FUNCTIONAL STATUS - GENERAL
MOVING FROM LYING ON BACK TO SITTING ON SIDE OF FLAT BED WITH BEDRAILS: A LITTLE
HELP NEEDED FOR BATHING: A LOT
CLIMB 3 TO 5 STEPS WITH RAILING: A LOT
STANDING UP FROM CHAIR USING ARMS: A LITTLE
TURNING FROM BACK TO SIDE WHILE IN FLAT BAD: A LITTLE
MOVING FROM LYING ON BACK TO SITTING ON SIDE OF FLAT BED WITH BEDRAILS: A LITTLE
MOBILITY SCORE: 17
DRESSING REGULAR LOWER BODY CLOTHING: A LOT
STANDING UP FROM CHAIR USING ARMS: A LITTLE
WALKING IN HOSPITAL ROOM: A LITTLE
CLIMB 3 TO 5 STEPS WITH RAILING: A LOT
MOVING TO AND FROM BED TO CHAIR: A LITTLE
MOVING TO AND FROM BED TO CHAIR: A LITTLE
TOILETING: A LITTLE
TURNING FROM BACK TO SIDE WHILE IN FLAT BAD: A LITTLE
DRESSING REGULAR UPPER BODY CLOTHING: A LITTLE
DAILY ACTIVITIY SCORE: 18
WALKING IN HOSPITAL ROOM: A LITTLE
MOBILITY SCORE: 17

## 2024-10-31 ASSESSMENT — PAIN DESCRIPTION - LOCATION: LOCATION: BACK

## 2024-10-31 ASSESSMENT — PAIN DESCRIPTION - ORIENTATION: ORIENTATION: LOWER

## 2024-10-31 ASSESSMENT — PAIN - FUNCTIONAL ASSESSMENT
PAIN_FUNCTIONAL_ASSESSMENT: 0-10

## 2024-11-01 ENCOUNTER — DOCUMENTATION (OUTPATIENT)
Dept: HOME HEALTH SERVICES | Facility: HOME HEALTH | Age: 74
End: 2024-11-01
Payer: MEDICARE

## 2024-11-01 VITALS
RESPIRATION RATE: 16 BRPM | HEART RATE: 79 BPM | OXYGEN SATURATION: 92 % | SYSTOLIC BLOOD PRESSURE: 113 MMHG | TEMPERATURE: 96.3 F | DIASTOLIC BLOOD PRESSURE: 74 MMHG

## 2024-11-01 LAB
ANION GAP SERPL CALC-SCNC: 12 MMOL/L (ref 10–20)
BUN SERPL-MCNC: 21 MG/DL (ref 6–23)
CALCIUM SERPL-MCNC: 9 MG/DL (ref 8.6–10.6)
CHLORIDE SERPL-SCNC: 97 MMOL/L (ref 98–107)
CO2 SERPL-SCNC: 27 MMOL/L (ref 21–32)
CREAT SERPL-MCNC: 0.93 MG/DL (ref 0.5–1.05)
EGFRCR SERPLBLD CKD-EPI 2021: 65 ML/MIN/1.73M*2
ERYTHROCYTE [DISTWIDTH] IN BLOOD BY AUTOMATED COUNT: 12.7 % (ref 11.5–14.5)
GLUCOSE SERPL-MCNC: 105 MG/DL (ref 74–99)
HCT VFR BLD AUTO: 28.5 % (ref 36–46)
HGB BLD-MCNC: 9.3 G/DL (ref 12–16)
MCH RBC QN AUTO: 31 PG (ref 26–34)
MCHC RBC AUTO-ENTMCNC: 32.6 G/DL (ref 32–36)
MCV RBC AUTO: 95 FL (ref 80–100)
NRBC BLD-RTO: 0 /100 WBCS (ref 0–0)
PLATELET # BLD AUTO: 230 X10*3/UL (ref 150–450)
POTASSIUM SERPL-SCNC: 4.5 MMOL/L (ref 3.5–5.3)
RBC # BLD AUTO: 3 X10*6/UL (ref 4–5.2)
SODIUM SERPL-SCNC: 131 MMOL/L (ref 136–145)
WBC # BLD AUTO: 10.3 X10*3/UL (ref 4.4–11.3)

## 2024-11-01 PROCEDURE — 2500000004 HC RX 250 GENERAL PHARMACY W/ HCPCS (ALT 636 FOR OP/ED): Performed by: NEUROLOGICAL SURGERY

## 2024-11-01 PROCEDURE — 97110 THERAPEUTIC EXERCISES: CPT | Mod: GP,CQ

## 2024-11-01 PROCEDURE — 36415 COLL VENOUS BLD VENIPUNCTURE: CPT | Performed by: NEUROLOGICAL SURGERY

## 2024-11-01 PROCEDURE — 97116 GAIT TRAINING THERAPY: CPT | Mod: GP,CQ

## 2024-11-01 PROCEDURE — 2500000001 HC RX 250 WO HCPCS SELF ADMINISTERED DRUGS (ALT 637 FOR MEDICARE OP): Performed by: NEUROLOGICAL SURGERY

## 2024-11-01 PROCEDURE — 85027 COMPLETE CBC AUTOMATED: CPT | Performed by: NEUROLOGICAL SURGERY

## 2024-11-01 PROCEDURE — 82374 ASSAY BLOOD CARBON DIOXIDE: CPT | Performed by: NEUROLOGICAL SURGERY

## 2024-11-01 RX ORDER — LISINOPRIL AND HYDROCHLOROTHIAZIDE 10; 12.5 MG/1; MG/1
1 TABLET ORAL DAILY
Start: 2024-11-01

## 2024-11-01 ASSESSMENT — COGNITIVE AND FUNCTIONAL STATUS - GENERAL
WALKING IN HOSPITAL ROOM: A LITTLE
STANDING UP FROM CHAIR USING ARMS: A LITTLE
MOBILITY SCORE: 17
CLIMB 3 TO 5 STEPS WITH RAILING: A LOT
MOVING FROM LYING ON BACK TO SITTING ON SIDE OF FLAT BED WITH BEDRAILS: A LITTLE
STANDING UP FROM CHAIR USING ARMS: A LITTLE
MOVING TO AND FROM BED TO CHAIR: A LITTLE
TURNING FROM BACK TO SIDE WHILE IN FLAT BAD: A LITTLE
HELP NEEDED FOR BATHING: A LITTLE
HELP NEEDED FOR BATHING: A LITTLE
MOVING FROM LYING ON BACK TO SITTING ON SIDE OF FLAT BED WITH BEDRAILS: A LITTLE
TURNING FROM BACK TO SIDE WHILE IN FLAT BAD: A LITTLE
CLIMB 3 TO 5 STEPS WITH RAILING: A LOT
TURNING FROM BACK TO SIDE WHILE IN FLAT BAD: A LITTLE
WALKING IN HOSPITAL ROOM: A LITTLE
MOBILITY SCORE: 18
WALKING IN HOSPITAL ROOM: A LITTLE
DRESSING REGULAR LOWER BODY CLOTHING: A LOT
DRESSING REGULAR UPPER BODY CLOTHING: A LITTLE
MOBILITY SCORE: 17
DAILY ACTIVITIY SCORE: 19
TOILETING: A LITTLE
DRESSING REGULAR LOWER BODY CLOTHING: A LOT
STANDING UP FROM CHAIR USING ARMS: A LITTLE
MOVING TO AND FROM BED TO CHAIR: A LITTLE
DRESSING REGULAR UPPER BODY CLOTHING: A LITTLE
TOILETING: A LITTLE
MOVING FROM LYING ON BACK TO SITTING ON SIDE OF FLAT BED WITH BEDRAILS: A LITTLE
MOVING TO AND FROM BED TO CHAIR: A LITTLE
DAILY ACTIVITIY SCORE: 19
CLIMB 3 TO 5 STEPS WITH RAILING: A LITTLE

## 2024-11-01 ASSESSMENT — PAIN - FUNCTIONAL ASSESSMENT
PAIN_FUNCTIONAL_ASSESSMENT: 0-10
PAIN_FUNCTIONAL_ASSESSMENT: 0-10

## 2024-11-01 ASSESSMENT — PAIN SCALES - GENERAL
PAINLEVEL_OUTOF10: 5 - MODERATE PAIN
PAINLEVEL_OUTOF10: 7

## 2024-11-02 LAB
BLOOD EXPIRATION DATE: NORMAL
BLOOD EXPIRATION DATE: NORMAL
DISPENSE STATUS: NORMAL
DISPENSE STATUS: NORMAL
PRODUCT BLOOD TYPE: 5100
PRODUCT BLOOD TYPE: 5100
PRODUCT CODE: NORMAL
PRODUCT CODE: NORMAL
UNIT ABO: NORMAL
UNIT ABO: NORMAL
UNIT NUMBER: NORMAL
UNIT NUMBER: NORMAL
UNIT RH: NORMAL
UNIT RH: NORMAL
UNIT VOLUME: 350
UNIT VOLUME: 350
XM INTEP: NORMAL
XM INTEP: NORMAL

## 2024-11-03 ENCOUNTER — HOME CARE VISIT (OUTPATIENT)
Dept: HOME HEALTH SERVICES | Facility: HOME HEALTH | Age: 74
End: 2024-11-03
Payer: MEDICARE

## 2024-11-03 VITALS
OXYGEN SATURATION: 100 % | DIASTOLIC BLOOD PRESSURE: 68 MMHG | HEART RATE: 77 BPM | SYSTOLIC BLOOD PRESSURE: 126 MMHG | TEMPERATURE: 97.3 F | RESPIRATION RATE: 18 BRPM

## 2024-11-03 PROCEDURE — 1090000002 HH PPS REVENUE DEBIT

## 2024-11-03 PROCEDURE — G0299 HHS/HOSPICE OF RN EA 15 MIN: HCPCS | Mod: HHH

## 2024-11-03 PROCEDURE — 169592 NO-PAY CLAIM PROCEDURE

## 2024-11-03 PROCEDURE — 1090000001 HH PPS REVENUE CREDIT

## 2024-11-03 PROCEDURE — 0023 HH SOC

## 2024-11-03 ASSESSMENT — ENCOUNTER SYMPTOMS
PERSON REPORTING PAIN: PATIENT
PAIN LOCATION - PAIN QUALITY: THROBBING
CONSTIPATION: 1
PAIN: 1
APPETITE LEVEL: GOOD
PAIN LOCATION: BACK
HIGHEST PAIN SEVERITY IN PAST 24 HOURS: 6/10
PAIN LOCATION - PAIN FREQUENCY: CONSTANT
LAST BOWEL MOVEMENT: 67142
CHANGE IN APPETITE: UNCHANGED
LOWEST PAIN SEVERITY IN PAST 24 HOURS: 4/10
MUSCLE WEAKNESS: 1
PAIN SEVERITY GOAL: 0/10
PAIN LOCATION - PAIN SEVERITY: 6/10

## 2024-11-03 ASSESSMENT — ACTIVITIES OF DAILY LIVING (ADL)
OASIS_M1830: 03
ENTERING_EXITING_HOME: STAND BY ASSIST
AMBULATION ASSISTANCE: STAND BY ASSIST
CURRENT_FUNCTION: STAND BY ASSIST

## 2024-11-04 ENCOUNTER — HOME CARE VISIT (OUTPATIENT)
Dept: HOME HEALTH SERVICES | Facility: HOME HEALTH | Age: 74
End: 2024-11-04
Payer: MEDICARE

## 2024-11-04 VITALS — DIASTOLIC BLOOD PRESSURE: 60 MMHG | OXYGEN SATURATION: 96 % | HEART RATE: 66 BPM | SYSTOLIC BLOOD PRESSURE: 118 MMHG

## 2024-11-04 PROCEDURE — G0152 HHCP-SERV OF OT,EA 15 MIN: HCPCS | Mod: HHH

## 2024-11-04 PROCEDURE — 1090000002 HH PPS REVENUE DEBIT

## 2024-11-04 PROCEDURE — 1090000001 HH PPS REVENUE CREDIT

## 2024-11-04 PROCEDURE — G0151 HHCP-SERV OF PT,EA 15 MIN: HCPCS | Mod: HHH

## 2024-11-04 ASSESSMENT — ENCOUNTER SYMPTOMS
OCCASIONAL FEELINGS OF UNSTEADINESS: 0
HIGHEST PAIN SEVERITY IN PAST 24 HOURS: 8/10
PAIN: 1
PAIN LOCATION: BACK
PAIN LOCATION - PAIN SEVERITY: 7/10
PAIN LOCATION - PAIN FREQUENCY: FREQUENT
PERSON REPORTING PAIN: PATIENT
SUBJECTIVE PAIN PROGRESSION: GRADUALLY IMPROVING
LOWEST PAIN SEVERITY IN PAST 24 HOURS: 6/10
PERSON REPORTING PAIN: PATIENT
HIGHEST PAIN SEVERITY IN PAST 24 HOURS: 10/10
PAIN LOCATION: BACK
PAIN LOCATION - EXACERBATING FACTORS: MOVEMENT
PAIN LOCATION - RELIEVING FACTORS: MEDS/ICE/REST
LOWEST PAIN SEVERITY IN PAST 24 HOURS: 7/10
PAIN: 1

## 2024-11-04 ASSESSMENT — ACTIVITIES OF DAILY LIVING (ADL)
TOILETING: INDEPENDENT
AMBULATION ASSISTANCE ON FLAT SURFACES: 1
TOILETING: 1
BATHING_CURRENT_FUNCTION: STAND BY ASSIST
DRESSING_UB_CURRENT_FUNCTION: INDEPENDENT
PREPARING MEALS: NEEDS ASSISTANCE
BATHING ASSESSED: 1
DRESSING_LB_CURRENT_FUNCTION: MINIMUM ASSIST

## 2024-11-04 NOTE — HOME HEALTH
patient seen for pt lela today with  present.  she went to the hospital to have lumbar surgery due to spinal stenosis.  prior to this she walk with no device but did furniture walk, indep with dressing and bathing and was driving.  they live in a bungalow home with 5 steps to exit

## 2024-11-05 PROCEDURE — 1090000001 HH PPS REVENUE CREDIT

## 2024-11-05 PROCEDURE — 1090000002 HH PPS REVENUE DEBIT

## 2024-11-06 PROCEDURE — 1090000002 HH PPS REVENUE DEBIT

## 2024-11-06 PROCEDURE — 1090000001 HH PPS REVENUE CREDIT

## 2024-11-07 ENCOUNTER — HOME CARE VISIT (OUTPATIENT)
Dept: HOME HEALTH SERVICES | Facility: HOME HEALTH | Age: 74
End: 2024-11-07
Payer: MEDICARE

## 2024-11-07 VITALS — OXYGEN SATURATION: 98 %

## 2024-11-07 PROCEDURE — 1090000002 HH PPS REVENUE DEBIT

## 2024-11-07 PROCEDURE — G0157 HHC PT ASSISTANT EA 15: HCPCS | Mod: HHH

## 2024-11-07 PROCEDURE — 1090000001 HH PPS REVENUE CREDIT

## 2024-11-07 ASSESSMENT — ENCOUNTER SYMPTOMS
HIGHEST PAIN SEVERITY IN PAST 24 HOURS: 10/10
PERSON REPORTING PAIN: PATIENT
PAIN LOCATION: BACK
PAIN: 1

## 2024-11-08 PROCEDURE — G0180 MD CERTIFICATION HHA PATIENT: HCPCS | Performed by: STUDENT IN AN ORGANIZED HEALTH CARE EDUCATION/TRAINING PROGRAM

## 2024-11-08 PROCEDURE — 1090000002 HH PPS REVENUE DEBIT

## 2024-11-08 PROCEDURE — 1090000001 HH PPS REVENUE CREDIT

## 2024-11-09 PROCEDURE — 1090000002 HH PPS REVENUE DEBIT

## 2024-11-09 PROCEDURE — 1090000001 HH PPS REVENUE CREDIT

## 2024-11-11 ENCOUNTER — HOME CARE VISIT (OUTPATIENT)
Dept: HOME HEALTH SERVICES | Facility: HOME HEALTH | Age: 74
End: 2024-11-11
Payer: MEDICARE

## 2024-11-11 VITALS
SYSTOLIC BLOOD PRESSURE: 118 MMHG | OXYGEN SATURATION: 100 % | TEMPERATURE: 98.5 F | HEART RATE: 78 BPM | RESPIRATION RATE: 18 BRPM | DIASTOLIC BLOOD PRESSURE: 68 MMHG

## 2024-11-11 PROCEDURE — G0299 HHS/HOSPICE OF RN EA 15 MIN: HCPCS | Mod: HHH

## 2024-11-11 SDOH — ECONOMIC STABILITY: GENERAL

## 2024-11-11 ASSESSMENT — ENCOUNTER SYMPTOMS
PAIN: 1
PAIN LOCATION - PAIN FREQUENCY: CONSTANT
HIGHEST PAIN SEVERITY IN PAST 24 HOURS: 6/10
PAIN SEVERITY GOAL: 0/10
LIMITED RANGE OF MOTION: 1
PAIN LOCATION: BACK
PAIN LOCATION - PAIN QUALITY: ACHING
APPETITE LEVEL: GOOD
MUSCLE WEAKNESS: 1
LOWEST PAIN SEVERITY IN PAST 24 HOURS: 3/10
PERSON REPORTING PAIN: PATIENT
PAIN LOCATION - PAIN SEVERITY: 6/10
CHANGE IN APPETITE: UNCHANGED

## 2024-11-11 ASSESSMENT — ACTIVITIES OF DAILY LIVING (ADL)
AMBULATION ASSISTANCE: STAND BY ASSIST
CURRENT_FUNCTION: STAND BY ASSIST
MONEY MANAGEMENT (EXPENSES/BILLS): INDEPENDENT

## 2024-11-12 ENCOUNTER — HOME CARE VISIT (OUTPATIENT)
Dept: HOME HEALTH SERVICES | Facility: HOME HEALTH | Age: 74
End: 2024-11-12
Payer: MEDICARE

## 2024-11-12 VITALS — OXYGEN SATURATION: 96 %

## 2024-11-12 PROCEDURE — G0157 HHC PT ASSISTANT EA 15: HCPCS | Mod: HHH

## 2024-11-12 ASSESSMENT — ENCOUNTER SYMPTOMS
PERSON REPORTING PAIN: PATIENT
PAIN: 1
PAIN LOCATION: BACK
HIGHEST PAIN SEVERITY IN PAST 24 HOURS: 3/10

## 2024-11-14 ENCOUNTER — HOME CARE VISIT (OUTPATIENT)
Dept: HOME HEALTH SERVICES | Facility: HOME HEALTH | Age: 74
End: 2024-11-14
Payer: MEDICARE

## 2024-11-14 VITALS — OXYGEN SATURATION: 98 %

## 2024-11-14 PROCEDURE — G0157 HHC PT ASSISTANT EA 15: HCPCS | Mod: HHH

## 2024-11-14 ASSESSMENT — ENCOUNTER SYMPTOMS
PAIN LOCATION: BACK
HIGHEST PAIN SEVERITY IN PAST 24 HOURS: 2/10
PAIN: 1
PERSON REPORTING PAIN: PATIENT

## 2024-11-18 ENCOUNTER — OFFICE VISIT (OUTPATIENT)
Facility: CLINIC | Age: 74
End: 2024-11-18
Payer: MEDICARE

## 2024-11-18 VITALS
HEART RATE: 68 BPM | DIASTOLIC BLOOD PRESSURE: 49 MMHG | WEIGHT: 206 LBS | HEIGHT: 62 IN | SYSTOLIC BLOOD PRESSURE: 107 MMHG | BODY MASS INDEX: 37.91 KG/M2

## 2024-11-18 DIAGNOSIS — M48.061 SPINAL STENOSIS OF LUMBAR REGION WITHOUT NEUROGENIC CLAUDICATION: ICD-10-CM

## 2024-11-18 DIAGNOSIS — Z98.1 S/P LUMBAR SPINAL FUSION: Primary | ICD-10-CM

## 2024-11-18 DIAGNOSIS — G89.18 POSTOPERATIVE PAIN: ICD-10-CM

## 2024-11-18 RX ORDER — OXYCODONE HYDROCHLORIDE 5 MG/1
5 TABLET ORAL EVERY 6 HOURS PRN
Qty: 28 TABLET | Refills: 0 | Status: SHIPPED | OUTPATIENT
Start: 2024-11-18 | End: 2024-11-25

## 2024-11-18 RX ORDER — CYCLOBENZAPRINE HCL 5 MG
5 TABLET ORAL 3 TIMES DAILY PRN
Qty: 30 TABLET | Refills: 0 | Status: SHIPPED | OUTPATIENT
Start: 2024-11-18

## 2024-11-18 ASSESSMENT — ENCOUNTER SYMPTOMS
DEPRESSION: 0
LOSS OF SENSATION IN FEET: 0
OCCASIONAL FEELINGS OF UNSTEADINESS: 0

## 2024-11-18 ASSESSMENT — ANXIETY QUESTIONNAIRES
IF YOU CHECKED OFF ANY PROBLEMS ON THIS QUESTIONNAIRE, HOW DIFFICULT HAVE THESE PROBLEMS MADE IT FOR YOU TO DO YOUR WORK, TAKE CARE OF THINGS AT HOME, OR GET ALONG WITH OTHER PEOPLE: NOT DIFFICULT AT ALL
2. NOT BEING ABLE TO STOP OR CONTROL WORRYING: NOT AT ALL
GAD7 TOTAL SCORE: 0
5. BEING SO RESTLESS THAT IT IS HARD TO SIT STILL: NOT AT ALL
4. TROUBLE RELAXING: NOT AT ALL
1. FEELING NERVOUS, ANXIOUS, OR ON EDGE: NOT AT ALL
3. WORRYING TOO MUCH ABOUT DIFFERENT THINGS: NOT AT ALL
7. FEELING AFRAID AS IF SOMETHING AWFUL MIGHT HAPPEN: NOT AT ALL
6. BECOMING EASILY ANNOYED OR IRRITABLE: NOT AT ALL

## 2024-11-18 NOTE — PROGRESS NOTES
"[facility] [place]  Department of Neurological Surgery  Post Operative Patient Visit      History of Present Illness:  Loretta Walsh is a 74 y.o. female who presents to the spine clinic in a post operative visit.  She underwent L2-L3, L3-L4 Extreme Lateral Interbody Fusion L2-S1 Extension Revision of Hardware at [facility] on 10/29/24 by Dr. Dameon Jaime. She was discharged home with home care on 10/31/24.    Since surgery, she is progressing well. She has been working with in home therapy and is making good progress. She is able to complete ADL's independently. She is still requiring some pain medication but has been able to wean her doses. Pain is overall much improved. She is happy with her progress thus far.         On exam:  A&O x 3, speech clear / fluent  Respirations even / unlabored  JEFFRIES without any focal deficit.   SILT  Ambulating with wheeled walker  SURGICAL INCISION is: posterior lumbar well approximated with mesh, no erythema / edema / drainage. Left lateral healing well, no erythema/edema or drainage      Loretta Walsh \"Ludwin\" is progressing well post operatively. She agrees to follow up with Dr. Jaime as previously scheduled, 1/20/25, with lumbar spine x-rays prior to visit. Orders placed. Refills provided today for Oxycodone and Flexeril. Referral placed for OP Physical Therapy.      I have personally reviewed the OARRS report. This report is scanned into the electronic medical record. I have considered the risks of abuse, dependence, addiction and diversion.     Patient with recent history of major reconstructive surgery necessitating narcotic medications at higher doses during the post-operative period of 6 weeks.     Will continue with close monitoring and short course refills.       The above clinical summary has been dictated with voice recognition software. It has not been proofread for grammatical errors, typographical mistakes, or other semantic inconsistencies.     Thank you " for visiting our office today. It was our pleasure to take part in your healthcare.      Do not hesitate to call with any questions regarding your plan of care after leaving at [phone] M-F 8am-4pm.      To clinicians, thank you very much for this kind referral. It is a privilege to partner with you in the care of your patients. My office would be delighted to assist you with any further consultations or with questions regarding the plan of care outlined. Do not hesitate to call the office or contact me directly.         Sincerely,          [place] [address]     Phone: [phone]  Fax: [phone]

## 2024-11-19 ENCOUNTER — HOME CARE VISIT (OUTPATIENT)
Dept: HOME HEALTH SERVICES | Facility: HOME HEALTH | Age: 74
End: 2024-11-19
Payer: MEDICARE

## 2024-11-19 VITALS
SYSTOLIC BLOOD PRESSURE: 122 MMHG | RESPIRATION RATE: 18 BRPM | HEART RATE: 73 BPM | OXYGEN SATURATION: 99 % | TEMPERATURE: 97.8 F | DIASTOLIC BLOOD PRESSURE: 70 MMHG

## 2024-11-19 PROCEDURE — G0300 HHS/HOSPICE OF LPN EA 15 MIN: HCPCS | Mod: HHH

## 2024-11-19 SDOH — ECONOMIC STABILITY: GENERAL

## 2024-11-19 ASSESSMENT — ENCOUNTER SYMPTOMS
PAIN LOCATION - RELIEVING FACTORS: PAIN MEDS
APPETITE LEVEL: GOOD
PAIN LOCATION - PAIN QUALITY: SHARP
PAIN: 1
PAIN LOCATION: BACK
PAIN LOCATION - PAIN SEVERITY: 4/10
PAIN LOCATION - PAIN FREQUENCY: INTERMITTENT
LAST BOWEL MOVEMENT: 67163

## 2024-11-19 ASSESSMENT — ACTIVITIES OF DAILY LIVING (ADL): MONEY MANAGEMENT (EXPENSES/BILLS): INDEPENDENT

## 2024-11-21 ENCOUNTER — HOME CARE VISIT (OUTPATIENT)
Dept: HOME HEALTH SERVICES | Facility: HOME HEALTH | Age: 74
End: 2024-11-21
Payer: MEDICARE

## 2024-11-21 PROCEDURE — G0151 HHCP-SERV OF PT,EA 15 MIN: HCPCS | Mod: HHH

## 2024-11-21 SDOH — HEALTH STABILITY: PHYSICAL HEALTH: EXERCISE TYPE: SITTING , STANDING EXS

## 2024-11-21 ASSESSMENT — ENCOUNTER SYMPTOMS
PAIN: 1
HIGHEST PAIN SEVERITY IN PAST 24 HOURS: 6/10
OCCASIONAL FEELINGS OF UNSTEADINESS: 0
LOWEST PAIN SEVERITY IN PAST 24 HOURS: 4/10
PERSON REPORTING PAIN: PATIENT
PAIN LOCATION: BACK

## 2024-11-21 ASSESSMENT — ACTIVITIES OF DAILY LIVING (ADL): AMBULATION ASSISTANCE ON FLAT SURFACES: 1

## 2024-11-21 NOTE — HOME HEALTH
patient seen for  dc today .  she states she is happy with her [facility] services and the care that has been priovided.  she denies any questions re her hep or activity and agrees with dc today

## 2024-11-26 ENCOUNTER — HOME CARE VISIT (OUTPATIENT)
Dept: HOME HEALTH SERVICES | Facility: HOME HEALTH | Age: 74
End: 2024-11-26
Payer: MEDICARE

## 2024-11-26 VITALS
HEART RATE: 81 BPM | SYSTOLIC BLOOD PRESSURE: 120 MMHG | DIASTOLIC BLOOD PRESSURE: 68 MMHG | RESPIRATION RATE: 18 BRPM | OXYGEN SATURATION: 98 % | TEMPERATURE: 98.1 F

## 2024-11-26 PROCEDURE — G0299 HHS/HOSPICE OF RN EA 15 MIN: HCPCS | Mod: HHH

## 2024-11-26 SDOH — ECONOMIC STABILITY: GENERAL

## 2024-11-26 ASSESSMENT — ENCOUNTER SYMPTOMS
PAIN LOCATION: BACK
CHANGE IN APPETITE: UNCHANGED
MUSCLE WEAKNESS: 1
LOWEST PAIN SEVERITY IN PAST 24 HOURS: 4/10
PAIN SEVERITY GOAL: 0/10
PAIN: 1
PAIN LOCATION - PAIN FREQUENCY: CONSTANT
APPETITE LEVEL: GOOD
HIGHEST PAIN SEVERITY IN PAST 24 HOURS: 8/10
PERSON REPORTING PAIN: PATIENT
PAIN LOCATION - PAIN SEVERITY: 4/10
PAIN LOCATION - PAIN QUALITY: ACHING

## 2024-11-26 ASSESSMENT — ACTIVITIES OF DAILY LIVING (ADL)
CURRENT_FUNCTION: STAND BY ASSIST
AMBULATION ASSISTANCE: STAND BY ASSIST
MONEY MANAGEMENT (EXPENSES/BILLS): INDEPENDENT

## 2024-11-27 ENCOUNTER — EVALUATION (OUTPATIENT)
Dept: PHYSICAL THERAPY | Facility: CLINIC | Age: 74
End: 2024-11-27
Payer: MEDICARE

## 2024-11-27 DIAGNOSIS — M48.061 SPINAL STENOSIS OF LUMBAR REGION WITHOUT NEUROGENIC CLAUDICATION: ICD-10-CM

## 2024-11-27 DIAGNOSIS — Z98.1 S/P LUMBAR SPINAL FUSION: ICD-10-CM

## 2024-11-27 PROCEDURE — 97110 THERAPEUTIC EXERCISES: CPT | Mod: GP

## 2024-11-27 PROCEDURE — 97161 PT EVAL LOW COMPLEX 20 MIN: CPT | Mod: GP

## 2024-11-27 NOTE — LETTER
November 27, 2024    Fabiola Stephens, PT  [address]   Rehabilitation Services  [facility] [address]    Patient: Ludwin Walsh   YOB: 1950   Date of Visit: 11/27/2024       Dear Mary E Julien, APRN-CNP  [address], [address] [place] [address]    The attached plan of care is being sent to you because your patient’s medical reimbursement requires that you certify the plan of care. Your signature is required to allow uninterrupted insurance coverage.      You may indicate your approval by signing below and faxing this form back to us at Dept Fax: [phone].    Please call Dept: [phone] with any questions or concerns.    Thank you for this referral,        Fabiola Stephens, PT  [address] [address]  [address] [facility] [address]    Payer: Payor: MEDICARE / Plan: MEDICARE PART A AND B / Product Type: *No Product type* /                                                                         Date:     Dear Fabiola Stephens PT,     Re: Ms. Loretta Walsh, MRN:[id number]    I certify that I have reviewed the attached plan of care and it is medically necessary for Ms. Loretta Walsh (1950) who is under my care.          ______________________________________                    _________________  Provider name and credentials                                           Date and time                                                                                           Plan of Care 11/27/24   Effective from: 11/27/2024  Effective to: 2/25/2025    Plan ID: [id number]            Participants as of Finalize on 11/27/2024    Name Type Comments Contact Info    LORNE Kohler Referring Provider  [phone]    Fabiola Stephens PT Physical Therapist  [phone]       Last Plan Note     Author: Fabiola Stephens PT Status: Incomplete Last edited: 11/27/2024 10:30 AM                                                       Physical Therapy  "Evaluation    Patient Name Loretta Walsh  MRN: [id number]  Today's Date: 11/27/2024  Time Calculation  Start Time: 1030  Stop Time: 1110  Time Calculation (min): 40 min    Insurance: Payor: MEDICARE / Plan: MEDICARE PART A AND B / Product Type: *No Product type* / $449.68 APPLIED   -authorization required: no  -Authorization/certification dates: 11/27/24-2/25/25  Next MD appointment: 1/20/25  Visit # 1    Problem List Items Addressed This Visit             ICD-10-CM    Spinal stenosis of lumbar region without neurogenic claudication M48.061    Relevant Orders    Follow Up In Physical Therapy     Other Visit Diagnoses         Codes    S/P lumbar spinal fusion     Z98.1            Onset Date: DOS 10/29/24  Referring Provider: LORNE Kohler   PT Orders: eval and treat  Date of Last Surgery: 10/29/2024  Procedure: L2-L3, L3-L4 Extreme Lateral Interbody Fusion L2-S1 Extension Revision of Hardware  Post Op Days: 29       Subjective:    Current Episode of Functional Impairment and/or Pain :  Chief complaint/HPI:  Long hx of LBP  Spinal fusion in 2021  New Spinal fusion 10/29/24 followed by [facility]  Precautions per pt No bending, lifting twisting, no heavier than gallon of milk, only reach as needed    Pain  Pain assessment 0-10  Pain score: 6  Pain location: B LS  Type: throbbing constantly    Exacerbating Factors: \"everything\"  Relieving Factors: meds/ice    Current Medical management:     PMHx: Reviewed medical history form with patient and medical screening assessed.   HTN, neuropathy     Medications for pain: percocet     Diagnostic Tests:    Precautions: low fall risk  Several falls pre-op due to R knee buckling  No bending, lifting, twisting  10# lifting restriction    Functional Limitations: Reaching, Lifting, Dressing, Sleep is interrupted., Driving, and Walking > 5 minutes  Using bed rail to get OOB, LE dsg, not driving    Home Living Situation: lives with spouse   2 story home with basement for " laundry    Prior Level of Function I amb w/o device    Patient Stated Goal For Therapy better movement, get stronger    Occupation: retired    Objective:    Ortho:  Lumbar ROM NT at this time    Flexibility   Hamstrings B 15 degrees with pulling in lumbar spine  piriformis: B markedly tight    Strength  BLE B hip flex 3+, B knee ext 4-  abdominals: 2+  back extensors  bridges 10% pain/difficult    Special Tests:   SLR: B -    posture: RS/FH    palpation: no POP  Incision healing, no drainage    Gait: arrived w/o device, reports intermittent use of cane  Slow amb with dec trunk rotation  Reciprocal stairs    SLS 1 finger support 10 sec, unable w/o UE support    Outcome Measures:  Other Measures  Oswestry Disablity Index (FRANC): 60     Treatment:    PT Evaluation Time Entry  PT Evaluation (Low) Time Entry: 30  minutes    Therapeutic Exercise:                             10 minutes    Pelvic tilt 10x     Adductor sets 10x  Hooklying hip abduction green 10x       Isometric hip flexion 10x    LAQ 1# 10x BLE                             Modalities:                                             Cold Pack                          minutes    Response to treatment: improved knowledge and understanding of condition  Loretta verbalized understanding and is in agreement with all goals and plan of care.  Loretta left session with all questions answered and no increase in symptoms.      Education: Educated on relevant anatomy and expected plan of care  Instructed in initial HEP including reasoning of given exercises and issued written instructions    Assessment:    Impression/Clinical Presentation:  Loretta Walsh  is a 74 y.o. old patient who participated in a physical therapy evaluation today due to s/p lumbar fusion.     Loretta presents with signs and symptoms consistent with s/p lumbar fusion. Loretta's impairments include: balance deficits, gait deficits, postural deficits, pain, decreased strength, decreased range of motion, and  decreased functional mobility.       Due to these impairments, she has the following functional limitations and participation restrictions:  increased fall risk, difficulty with ambulation, difficulty performing household activities, difficulty with sleeping, and difficulty with completing/performing some ADLs/IADLs.     Skilled PT   Skilled physical therapy services are appropriate and beneficial in order to achieve measurable and meaningful change in the objective tests and measures. Utilization of skilled physical therapy services will aid in advancing her functional status and attaining her therapy-related goals.     Problem List:  -activity/functional limitations  -Pain B LS  -decreased  ROM  -decreased strength   -decreased flexibility  -posture awareness  -decreased knowledge of HEP  -balance    Goals:  STG 2 wks  Compliant with HEP  Dec pain 10%    LTG by discharge  I HEP  Improve functional outcome score to indicate improved functional mobility  Dec pain B LS 25-50% on pain scale with improved sleep  Inc posture awareness  Inc AROM lumbar WFL with improved LE dsg  Inc BLE/core strength 1 grade with improved walking tolerance  Inc LE flexibility WFL  Improve SLS with reports of no falls    Rehab Potential:  good    Clinical Presentation:    stable/and/or uncomplicated characteristics                                              Level of Complexity: low    Plan:    Therapeutic exercise, Manual therapy, Gait training, Home program instruction and progression, Neuromuscular re-education, Therapeutic activities, Self care and home management, Instruction in activity modification, Electrical stimulation, and Cryotherapy  Nustep for soft tissue warmup, posture instruction/exercises, lumbar ROM per MD, LE flexibility, DLS, modalities prn, balance activities, gait/stair training  Currently Focus on LE/core stabilization and balance activities      2 x week  until goals met or maximum rehab potential met  Pt is  currently scheduled for 8 weeks    Plan of care was designed with input and agreement by the patient           Current Participants as of 11/27/2024    Name Type Comments Contact Info    SMILEY Kohler-CNP Referring Provider  [phone]    Signature pending    Fabiola Stephens PT Physical Therapist  [phone]    Electronically signed by Fabiola Stephens PT at 11/27/2024 1114 EST

## 2024-11-27 NOTE — PROGRESS NOTES
"                                                Physical Therapy Evaluation    Patient Name Loretta Walsh  MRN: [id number]  Today's Date: 11/27/2024  Time Calculation  Start Time: 1030  Stop Time: 1110  Time Calculation (min): 40 min    Insurance: Payor: MEDICARE / Plan: MEDICARE PART A AND B / Product Type: *No Product type* / $449.68 APPLIED   -authorization required: no  -Authorization/certification dates: 11/27/24-2/25/25 POC signed 11/27/24  Next MD appointment: 1/20/25  Visit # 1    Problem List Items Addressed This Visit             ICD-10-CM    Spinal stenosis of lumbar region without neurogenic claudication M48.061    Relevant Orders    Follow Up In Physical Therapy     Other Visit Diagnoses         Codes    S/P lumbar spinal fusion     Z98.1            Onset Date: DOS 10/29/24  Referring Provider: LORNE Kohler   PT Orders: eval and treat  Date of Last Surgery: 10/29/2024  Procedure: L2-L3, L3-L4 Extreme Lateral Interbody Fusion L2-S1 Extension Revision of Hardware  Post Op Days: 29       Subjective:    Current Episode of Functional Impairment and/or Pain :  Chief complaint/HPI:  Long hx of LBP  Spinal fusion in 2021  New Spinal fusion 10/29/24 followed by [facility]  Precautions per pt No bending, lifting twisting, no heavier than gallon of milk, only reach as needed    Pain  Pain assessment 0-10  Pain score: 6  Pain location: B LS  Type: throbbing constantly    Exacerbating Factors: \"everything\"  Relieving Factors: meds/ice    Current Medical management:     PMHx: Reviewed medical history form with patient and medical screening assessed.   HTN, neuropathy     Medications for pain: percocet     Diagnostic Tests:    Precautions: low fall risk  Several falls pre-op due to R knee buckling  No bending, lifting, twisting  10# lifting restriction    Functional Limitations: Reaching, Lifting, Dressing, Sleep is interrupted., Driving, and Walking > 5 minutes  Using bed rail to get OOB, LE dsg, not " driving    Home Living Situation: lives with spouse   2 story home with basement for laundry    Prior Level of Function I amb w/o device    Patient Stated Goal For Therapy better movement, get stronger    Occupation: retired    Objective:    Ortho:  Lumbar ROM NT at this time    Flexibility   Hamstrings B 15 degrees with pulling in lumbar spine  piriformis: B markedly tight    Strength  BLE B hip flex 3+, B knee ext 4-  abdominals: 2+  back extensors  bridges 10% pain/difficult    Special Tests:   SLR: B -    posture: RS/FH    palpation: no POP  Incision healing, no drainage    Gait: arrived w/o device, reports intermittent use of cane  Slow amb with dec trunk rotation  Reciprocal stairs    SLS 1 finger support 10 sec, unable w/o UE support    Outcome Measures:  Other Measures  Oswestry Disablity Index (FRANC): 60     Treatment:    PT Evaluation Time Entry  PT Evaluation (Low) Time Entry: 30  minutes    Therapeutic Exercise:                             10 minutes    Pelvic tilt 10x     Adductor sets 10x  Hooklying hip abduction green 10x         LAQ 1# 10x BLE                             Response to treatment: improved knowledge and understanding of condition  Loretta verbalized understanding and is in agreement with all goals and plan of care.  Loretta left session with all questions answered and no increase in symptoms.      Education: Educated on relevant anatomy and expected plan of care  Instructed in initial HEP including reasoning of given exercises and issued written instructions    Assessment:    Impression/Clinical Presentation:  Loretta Walsh  is a 74 y.o. old patient who participated in a physical therapy evaluation today due to s/p lumbar fusion.     Loretta presents with signs and symptoms consistent with s/p lumbar fusion. Loretta's impairments include: balance deficits, gait deficits, postural deficits, pain, decreased strength, decreased range of motion, and decreased functional mobility.       Due to  these impairments, she has the following functional limitations and participation restrictions:  increased fall risk, difficulty with ambulation, difficulty performing household activities, difficulty with sleeping, and difficulty with completing/performing some ADLs/IADLs.     Skilled PT   Skilled physical therapy services are appropriate and beneficial in order to achieve measurable and meaningful change in the objective tests and measures. Utilization of skilled physical therapy services will aid in advancing her functional status and attaining her therapy-related goals.     Problem List:  -activity/functional limitations  -Pain B LS  -decreased  ROM  -decreased strength   -decreased flexibility  -posture awareness  -decreased knowledge of HEP  -balance    Goals:  STG 2 wks  Compliant with HEP  Dec pain 10%    LTG by discharge  I HEP  Improve functional outcome score to indicate improved functional mobility  Dec pain B LS 25-50% on pain scale with improved sleep  Inc posture awareness  Inc AROM lumbar WFL with improved LE dsg  Inc BLE/core strength 1 grade with improved walking tolerance  Inc LE flexibility WFL  Improve SLS with reports of no falls    Rehab Potential:  good    Clinical Presentation:    stable/and/or uncomplicated characteristics                                              Level of Complexity: low    Plan:    Therapeutic exercise, Manual therapy, Gait training, Home program instruction and progression, Neuromuscular re-education, Therapeutic activities, Self care and home management, Instruction in activity modification, Electrical stimulation, and Cryotherapy  Nustep for soft tissue warmup, posture instruction/exercises, lumbar ROM per MD, LE flexibility, DLS, modalities prn, balance activities, gait/stair training  Currently Focus on LE/core stabilization and balance activities      2 x week  until goals met or maximum rehab potential met  Pt is currently scheduled for 8 weeks    Plan of care  was designed with input and agreement by the patient

## 2024-12-03 ENCOUNTER — HOME CARE VISIT (OUTPATIENT)
Dept: HOME HEALTH SERVICES | Facility: HOME HEALTH | Age: 74
End: 2024-12-03
Payer: MEDICARE

## 2024-12-03 VITALS
SYSTOLIC BLOOD PRESSURE: 118 MMHG | DIASTOLIC BLOOD PRESSURE: 66 MMHG | TEMPERATURE: 97.5 F | OXYGEN SATURATION: 99 % | HEART RATE: 90 BPM | RESPIRATION RATE: 18 BRPM

## 2024-12-03 PROCEDURE — G0299 HHS/HOSPICE OF RN EA 15 MIN: HCPCS | Mod: HHH

## 2024-12-03 SDOH — ECONOMIC STABILITY: GENERAL

## 2024-12-03 ASSESSMENT — ACTIVITIES OF DAILY LIVING (ADL)
AMBULATION ASSISTANCE: STAND BY ASSIST
HOME_HEALTH_OASIS: 01
CURRENT_FUNCTION: STAND BY ASSIST
MONEY MANAGEMENT (EXPENSES/BILLS): INDEPENDENT
OASIS_M1830: 03

## 2024-12-03 ASSESSMENT — ENCOUNTER SYMPTOMS
APPETITE LEVEL: GOOD
CHANGE IN APPETITE: UNCHANGED
PAIN LOCATION - PAIN SEVERITY: 5/10
PAIN LOCATION - PAIN FREQUENCY: INTERMITTENT
PAIN LOCATION - PAIN QUALITY: ACHING
HIGHEST PAIN SEVERITY IN PAST 24 HOURS: 5/10
MUSCLE WEAKNESS: 1
PAIN SEVERITY GOAL: 0/10
LOWEST PAIN SEVERITY IN PAST 24 HOURS: 0/10
PAIN: 1
PAIN LOCATION: BACK
PERSON REPORTING PAIN: PATIENT

## 2024-12-11 ENCOUNTER — TREATMENT (OUTPATIENT)
Dept: PHYSICAL THERAPY | Facility: CLINIC | Age: 74
End: 2024-12-11
Payer: MEDICARE

## 2024-12-11 ENCOUNTER — APPOINTMENT (OUTPATIENT)
Dept: PHYSICAL THERAPY | Facility: CLINIC | Age: 74
End: 2024-12-11
Payer: MEDICARE

## 2024-12-11 DIAGNOSIS — M48.061 SPINAL STENOSIS OF LUMBAR REGION WITHOUT NEUROGENIC CLAUDICATION: ICD-10-CM

## 2024-12-11 PROCEDURE — 97112 NEUROMUSCULAR REEDUCATION: CPT | Mod: GP

## 2024-12-11 PROCEDURE — 97110 THERAPEUTIC EXERCISES: CPT | Mod: GP

## 2024-12-11 NOTE — PROGRESS NOTES
Physical Therapy Treatment Note      Patient Name Loretta Walsh  MRN: [id number]  Today's Date: 12/11/2024  Time Calculation  Start Time: 1020  Stop Time: 1100  Time Calculation (min): 40 min    Insurance: Payor: MEDICARE / Plan: MEDICARE PART A AND B / Product Type: *No Product type* /  $449.68 APPLIED   Visit #: 2  Date of Onset:  DOS 10/29/24   -authorization required: no  -number of visits authorized  -authorization/ certification dates: 11/27/24-2/25/25 POC signed 11/27/24     Problem List Items Addressed This Visit             ICD-10-CM    Spinal stenosis of lumbar region without neurogenic claudication M48.061       General:  Referred by: SMILEY Kohler-CNP   Next MD appt: 1/20/25   Date of Last Surgery: 10/29/2024  Procedure: L2-L3, L3-L4 Extreme Lateral Interbody Fusion L2-S1 Extension Revision of Hardware  Post-Op Days: 43     Precautions: low fall risk  Several falls pre-op due to R knee buckling  No bending, lifting, twisting  10# lifting restriction    Subjective:  General:  Reporting pain lateral trunk where they went in from the side for the fusion  Using ice multiple times a day    Functional Progress:  Sleeping 1.5 hrs at a time  Using bed rail to get OOB  Drove to PT today    Compliant with HEP:  yes    Understanding of HEP: WNL    Pain  Pain assessment 0-10  Pain score: 5  Pain location: L side    Objective:  Therapeutic Exercise  25 minutes  see below  **indicates new exercises or progression  NP=not performed    Neuromuscular Re-education:  15 minutes  See below  **indicates new exercises or progression  NP=not performed      Modalities   Cold Pack                          minutes    Other     Exercise Log:  Nustep L1 5' **  Pelvic tilt 10x     Adductor sets 20x  Hooklying hip abduction green 20x    Hooklying marching green 20x **     Isometric hip flexion 10x **  LAQ 1# 10x BLE  Seated hamstring curls green 10x2 **  DLS SLR  BLE 10x **    Standing calf raises 10x2 **  SLS BLE 10 sec 5x **  Standing hip abd/ext 1# 10x 2 **    Education:  Instructed in progression of exercises    Assessment:  skilled intervention: exercise progression for core stabilization    Patient would continue to benefit from skilled PT to address remaining functional, objective and subjective deficits to allow them to return to full independence with ADLs     Progressed with balance exercises and LE strengthening   BLE's  fatigue easily    Plan:  Side stepping

## 2024-12-16 ENCOUNTER — APPOINTMENT (OUTPATIENT)
Dept: PAIN MEDICINE | Facility: CLINIC | Age: 74
End: 2024-12-16
Payer: MEDICARE

## 2024-12-16 ENCOUNTER — TREATMENT (OUTPATIENT)
Dept: PHYSICAL THERAPY | Facility: CLINIC | Age: 74
End: 2024-12-16
Payer: MEDICARE

## 2024-12-16 DIAGNOSIS — M48.061 SPINAL STENOSIS OF LUMBAR REGION WITHOUT NEUROGENIC CLAUDICATION: ICD-10-CM

## 2024-12-16 PROCEDURE — 97110 THERAPEUTIC EXERCISES: CPT | Mod: GP

## 2024-12-16 PROCEDURE — 97112 NEUROMUSCULAR REEDUCATION: CPT | Mod: GP

## 2024-12-16 NOTE — PROGRESS NOTES
"                                                                Physical Therapy Treatment Note      Patient Name Loretta Walsh  MRN: [id number]  Today's Date: 12/16/2024  Time Calculation  Start Time: 1155  Stop Time: 1235  Time Calculation (min): 40 min    Insurance: Payor: MEDICARE / Plan: MEDICARE PART A AND B / Product Type: *No Product type* /  $449.68 APPLIED   Visit #: 3  Date of Onset:  DOS 10/29/24   -authorization required: no  -number of visits authorized  -authorization/ certification dates: 11/27/24-2/25/25 POC signed 11/27/24     Problem List Items Addressed This Visit             ICD-10-CM    Spinal stenosis of lumbar region without neurogenic claudication M48.061         General:  Referred by: SMILEY Kohler-CNP   Next MD appt: 1/20/25   Date of Last Surgery: 10/29/2024  Procedure: L2-L3, L3-L4 Extreme Lateral Interbody Fusion L2-S1 Extension Revision of Hardware  Post-Op Days: 48     Precautions: low fall risk  Several falls pre-op due to R knee buckling  No bending, lifting, twisting  10# lifting restriction    Subjective:  General:  Pain has been worse with temperature changes and rain    Functional Progress:  Goes to basement 1x day    Compliant with HEP:  yes    Understanding of HEP: WNL    Pain  Pain assessment 0-10  Pain score: 4  Pain location: L side lateral trunk    Objective:  Therapeutic Exercise  25 minutes  see below  **indicates new exercises or progression  NP=not performed    Neuromuscular Re-education:  15 minutes  See below  **indicates new exercises or progression  NP=not performed    Other     Exercise Log:  Nustep L1 7'   Pelvic tilt 10x     Adductor sets 20x  Hooklying hip abduction green 20x    Hooklying marching green 20x   Isometric hip flexion 10x   LAQ 1# 10x BLE  Seated hamstring curls green 10x2   DLS SLR BLE 10x   1# **     Standing calf raises 10x2  airex **  SLS BLE 10 sec 5x  airex   Standing hip abd/ext 1# 10x 2   Side stepping 3x  **  Step ups F/L 4\" 10x " **     Education:  Instructed in progression of exercises    Assessment:  skilled intervention: exercise progression for balance    Patient would continue to benefit from skilled PT to address remaining functional, objective and subjective deficits to allow them to return to full independence with ADLs     Progressed balance activities and step exercises   Inc nustep time w/o fatigue    Plan:  Clamshells   Step downs

## 2024-12-17 ENCOUNTER — LAB (OUTPATIENT)
Dept: LAB | Facility: LAB | Age: 74
End: 2024-12-17
Payer: MEDICARE

## 2024-12-17 ENCOUNTER — APPOINTMENT (OUTPATIENT)
Dept: PRIMARY CARE | Facility: CLINIC | Age: 74
End: 2024-12-17
Payer: MEDICARE

## 2024-12-17 VITALS
BODY MASS INDEX: 37.73 KG/M2 | HEIGHT: 62 IN | WEIGHT: 205 LBS | SYSTOLIC BLOOD PRESSURE: 124 MMHG | DIASTOLIC BLOOD PRESSURE: 76 MMHG

## 2024-12-17 DIAGNOSIS — F41.9 ANXIETY: ICD-10-CM

## 2024-12-17 DIAGNOSIS — Z12.31 ENCOUNTER FOR SCREENING MAMMOGRAM FOR MALIGNANT NEOPLASM OF BREAST: ICD-10-CM

## 2024-12-17 DIAGNOSIS — I10 ESSENTIAL (PRIMARY) HYPERTENSION: ICD-10-CM

## 2024-12-17 DIAGNOSIS — E78.5 DYSLIPIDEMIA: ICD-10-CM

## 2024-12-17 DIAGNOSIS — Z00.00 HEALTHCARE MAINTENANCE: ICD-10-CM

## 2024-12-17 DIAGNOSIS — M19.90 ARTHRITIS: ICD-10-CM

## 2024-12-17 DIAGNOSIS — Z12.39 ENCOUNTER FOR SCREENING FOR MALIGNANT NEOPLASM OF BREAST, UNSPECIFIED SCREENING MODALITY: Primary | ICD-10-CM

## 2024-12-17 LAB
CHOLEST SERPL-MCNC: 175 MG/DL (ref 0–199)
CHOLESTEROL/HDL RATIO: 3.7
HDLC SERPL-MCNC: 47.7 MG/DL
LDLC SERPL CALC-MCNC: 92 MG/DL
NON HDL CHOLESTEROL: 127 MG/DL (ref 0–149)
TRIGL SERPL-MCNC: 179 MG/DL (ref 0–149)
TSH SERPL-ACNC: 1.41 MIU/L (ref 0.44–3.98)
VLDL: 36 MG/DL (ref 0–40)

## 2024-12-17 PROCEDURE — 99213 OFFICE O/P EST LOW 20 MIN: CPT | Performed by: STUDENT IN AN ORGANIZED HEALTH CARE EDUCATION/TRAINING PROGRAM

## 2024-12-17 PROCEDURE — 3078F DIAST BP <80 MM HG: CPT | Performed by: STUDENT IN AN ORGANIZED HEALTH CARE EDUCATION/TRAINING PROGRAM

## 2024-12-17 PROCEDURE — 3074F SYST BP LT 130 MM HG: CPT | Performed by: STUDENT IN AN ORGANIZED HEALTH CARE EDUCATION/TRAINING PROGRAM

## 2024-12-17 PROCEDURE — G2211 COMPLEX E/M VISIT ADD ON: HCPCS | Performed by: STUDENT IN AN ORGANIZED HEALTH CARE EDUCATION/TRAINING PROGRAM

## 2024-12-17 PROCEDURE — 36415 COLL VENOUS BLD VENIPUNCTURE: CPT

## 2024-12-17 PROCEDURE — 3008F BODY MASS INDEX DOCD: CPT | Performed by: STUDENT IN AN ORGANIZED HEALTH CARE EDUCATION/TRAINING PROGRAM

## 2024-12-17 PROCEDURE — 84443 ASSAY THYROID STIM HORMONE: CPT

## 2024-12-17 PROCEDURE — 80061 LIPID PANEL: CPT

## 2024-12-17 RX ORDER — LISINOPRIL AND HYDROCHLOROTHIAZIDE 10; 12.5 MG/1; MG/1
1 TABLET ORAL DAILY
Qty: 90 TABLET | Refills: 1 | Status: SHIPPED | OUTPATIENT
Start: 2024-12-17

## 2024-12-17 RX ORDER — ATORVASTATIN CALCIUM 40 MG/1
40 TABLET, FILM COATED ORAL DAILY
Qty: 90 TABLET | Refills: 1 | Status: SHIPPED | OUTPATIENT
Start: 2024-12-17

## 2024-12-17 RX ORDER — ESCITALOPRAM OXALATE 20 MG/1
20 TABLET ORAL DAILY
Qty: 90 TABLET | Refills: 1 | Status: SHIPPED | OUTPATIENT
Start: 2024-12-17

## 2024-12-17 RX ORDER — MELOXICAM 15 MG/1
15 TABLET ORAL DAILY
Qty: 90 TABLET | Refills: 1 | Status: SHIPPED | OUTPATIENT
Start: 2024-12-17

## 2024-12-17 RX ORDER — OMEPRAZOLE 20 MG/1
20 CAPSULE, DELAYED RELEASE ORAL 2 TIMES DAILY
Qty: 180 CAPSULE | Refills: 1 | Status: SHIPPED | OUTPATIENT
Start: 2024-12-17

## 2024-12-17 NOTE — PROGRESS NOTES
"follSubjective   Patient ID: Ludwin Walsh is a 74 y.o. female who presents for Follow-up.    HPI     Presents for follow-up.  Underwent redo L2 L5 decompression and fusion, L3-L4 TLIF.  Has been recovering well.  Pain is improved compared to prior to surgery.    Review of Systems    8 point review of systems is otherwise negative unless mentioned on HPI      Objective   /76   Ht 1.575 m (5' 2\")   Wt 93 kg (205 lb)   BMI 37.49 kg/m²     Physical Exam    General: No acute distress  HEENT: EOMI  CV: Regular rate and rhythm, normal S1 and S2, no murmurs  Pulm: Clear to auscultation bilaterally, no wheezings, rales or rhonchi  Abd: Nondistended  MSK: 5/5 strength in all extremities  Skin: No rashes   Lymphatic: No lymphadenopathy      Assessment/Plan       Back pain  -Following with spine surgery and pain management  -10/29/24 s/p redo L2-5 decompression and fusion, L3-4 TLIF   -Steroids bursts have not been helpful  -Had side effects from gabapentin and stopped taking  -Has been receiving injections     Hypertension  -Continue lisinopril/hydrochlorothiazide 10/12.5 mg daily     Dyslipidemia  -Continue atorvastatin 40 mg daily  -Coronary artery calcium score of 190 in 8/2023     Depression  -After previous visit had decreased Lexapro to 10 mg daily, had return of symptoms and has since resumed 20 mg daily     Hand pain/thumb pain  -Has been on meloxicam as needed  -Has seen ortho-hand and received steroid injections     Healthcare maintenance  -Colonoscopy 2020, repeat interval of 10 years, had not been recommended for any further colonoscopies  -Mammogram 2/2024, given requisition for next year  -Nystatin powder for rash underneath breasts and abdomen     RTC 6 months      This note was dictated by speech recognition. Minor errors in transcription may be present.  "

## 2024-12-19 ENCOUNTER — TREATMENT (OUTPATIENT)
Dept: PHYSICAL THERAPY | Facility: CLINIC | Age: 74
End: 2024-12-19
Payer: MEDICARE

## 2024-12-19 DIAGNOSIS — M48.061 SPINAL STENOSIS OF LUMBAR REGION WITHOUT NEUROGENIC CLAUDICATION: ICD-10-CM

## 2024-12-19 PROCEDURE — 97110 THERAPEUTIC EXERCISES: CPT | Mod: GP

## 2024-12-19 PROCEDURE — 97112 NEUROMUSCULAR REEDUCATION: CPT | Mod: GP

## 2024-12-19 NOTE — PROGRESS NOTES
Physical Therapy Treatment Note      Patient Name Loretta Walsh  MRN: [id number]  Today's Date: 12/19/2024  Time Calculation  Start Time: 1130  Stop Time: 1210  Time Calculation (min): 40 min    Insurance: Payor: MEDICARE / Plan: MEDICARE PART A AND B / Product Type: *No Product type* /  $449.68 APPLIED   Visit #: 4  Date of Onset:  DOS 10/29/24   -authorization required: no  -number of visits authorized  -authorization/ certification dates: 11/27/24-2/25/25 POC signed 11/27/24     Problem List Items Addressed This Visit             ICD-10-CM    Spinal stenosis of lumbar region without neurogenic claudication M48.061       General:  Referred by: SMILEY Kohler-CNP   Next MD appt: 1/20/25   Date of Last Surgery: 10/29/2024  Procedure: L2-L3, L3-L4 Extreme Lateral Interbody Fusion L2-S1 Extension Revision of Hardware  Post-Op Days: 51     Precautions: low fall risk  Several falls pre-op due to R knee buckling  No bending, lifting, twisting  10# lifting restriction    Subjective:  General:  Feeling better , arrived with low pain level    Functional Progress:  Starting to do more thins around the house  Standing tolerance improving, able to fold clothes standing now  Doing steps at least 2x day-reciprocal    Compliant with HEP:  yes    Understanding of HEP: WNL    Pain  Pain assessment 0-10  Pain score: 1  Pain location: L side lateral trunk    Objective:  Therapeutic Exercise  25 minutes  see below  **indicates new exercises or progression  NP=not performed    Neuromuscular Re-education:  15 minutes  See below  **indicates new exercises or progression  NP=not performed    Other     Exercise Log:  Nustep L1 7'   Pelvic tilt 10x     Adductor sets 20x  Hooklying hip abduction green 20x    Hooklying marching green 20x   Isometric hip flexion 10x   LAQ 1# 10x BLE  Seated hamstring curls green 10x2   DLS SLR BLE 10x   1#   Sidelying hip abd 1# 10x BLE  "**  Makeda 10x **     Standing calf raises 10x2  airex   SLS BLE 10 sec 5x  airex   Side stepping balance beam 3x **  Standing hip abd/ext 1# 10x 2   Side stepping 3x    Step ups F/L 4\" 10x   Step downs 4\" 10x **    Education:  Instructed in progression of exercises    Assessment:  skilled intervention: exercise progression for strength/function    Patient would continue to benefit from skilled PT to address remaining functional, objective and subjective deficits to allow them to return to full independence with ADLs     progressed with step downs for function and hip strengthening with makeda w/o difficutly    Plan:  Standing DLS  6\" step ups    "

## 2024-12-20 ENCOUNTER — HOSPITAL ENCOUNTER (OUTPATIENT)
Dept: RADIOLOGY | Facility: CLINIC | Age: 74
Discharge: HOME | End: 2024-12-20
Payer: MEDICARE

## 2024-12-20 DIAGNOSIS — M48.061 SPINAL STENOSIS OF LUMBAR REGION WITHOUT NEUROGENIC CLAUDICATION: ICD-10-CM

## 2024-12-20 DIAGNOSIS — Z98.1 S/P LUMBAR SPINAL FUSION: ICD-10-CM

## 2024-12-20 PROCEDURE — 72100 X-RAY EXAM L-S SPINE 2/3 VWS: CPT

## 2024-12-24 ENCOUNTER — APPOINTMENT (OUTPATIENT)
Dept: PHYSICAL THERAPY | Facility: CLINIC | Age: 74
End: 2024-12-24
Payer: MEDICARE

## 2024-12-27 ENCOUNTER — TREATMENT (OUTPATIENT)
Dept: PHYSICAL THERAPY | Facility: CLINIC | Age: 74
End: 2024-12-27
Payer: MEDICARE

## 2024-12-27 DIAGNOSIS — M48.061 SPINAL STENOSIS OF LUMBAR REGION WITHOUT NEUROGENIC CLAUDICATION: ICD-10-CM

## 2024-12-27 PROCEDURE — 97110 THERAPEUTIC EXERCISES: CPT | Mod: GP

## 2024-12-27 PROCEDURE — 97112 NEUROMUSCULAR REEDUCATION: CPT | Mod: GP

## 2024-12-27 NOTE — PROGRESS NOTES
Physical Therapy Treatment Note      Patient Name Loretta Walsh  MRN: [id number]  Today's Date: 12/27/2024  Time Calculation  Start Time: 0915  Stop Time: 0955  Time Calculation (min): 40 min    Insurance: Payor: MEDICARE / Plan: MEDICARE PART A AND B / Product Type: *No Product type* /  $449.68 APPLIED   Visit #: 5  Date of Onset:  DOS 10/29/24   -authorization required: no  -number of visits authorized  -authorization/ certification dates: 11/27/24-2/25/25 POC signed 11/27/24     Problem List Items Addressed This Visit             ICD-10-CM    Spinal stenosis of lumbar region without neurogenic claudication M48.061       General:  Referred by: SMILEY Kohler-CNP   Next MD appt: 1/20/25   Date of Last Surgery: 10/29/2024  Procedure: L2-L3, L3-L4 Extreme Lateral Interbody Fusion L2-S1 Extension Revision of Hardware  Post-Op Days: 59     Precautions: low fall risk  Several falls pre-op due to R knee buckling  No bending, lifting, twisting  10# lifting restriction    Subjective:  General:  No pain today    Functional Progress:  Stairs reciprocal carefully  Not doing laundry yet    Compliant with HEP:  yes    Understanding of HEP: WNL    Pain  Pain assessment 0-10  Pain score: 0  Pain location: lumbar    Objective:  Therapeutic Exercise  25 minutes  see below  **indicates new exercises or progression  NP=not performed    Neuromuscular Re-education:  15 minutes  See below  **indicates new exercises or progression  NP=not performed      Other     Exercise Log:  Nustep L1 10'   Pelvic tilt 10x     Adductor sets 20x (HEP)  Hooklying hip abduction green 20x    Hooklying marching green 20x   Isometric hip flexion 10x (HEP)  LAQ 1# 10x BLE  Seated hamstring curls green 10x2   DLS SLR BLE 10x   1#   Sidelying hip abd 1# 10x BLE   Clamshells 10x NP     Standing calf raises 10x2  airex   SLS BLE 10 sec 5x  airex   Side stepping balance beam 3x   Standing hip  "abd/ext 1# 10x 2   Side stepping 3x  NP  Step ups F/L 6\" 10x **  Step downs 6\" 10x **    DLS flex/ext green 10x2 **    Education:  Instructed in progression of exercises    Assessment:  skilled intervention: exercise progression for strength/function    Patient would continue to benefit from skilled PT to address remaining functional, objective and subjective deficits to allow them to return to full independence with ADLs     Plan:  Inc free weights   [facility]    "

## 2024-12-30 ENCOUNTER — TREATMENT (OUTPATIENT)
Dept: PHYSICAL THERAPY | Facility: CLINIC | Age: 74
End: 2024-12-30
Payer: MEDICARE

## 2024-12-30 DIAGNOSIS — M48.061 SPINAL STENOSIS OF LUMBAR REGION WITHOUT NEUROGENIC CLAUDICATION: ICD-10-CM

## 2024-12-30 PROCEDURE — 97110 THERAPEUTIC EXERCISES: CPT | Mod: GP

## 2024-12-30 PROCEDURE — 97112 NEUROMUSCULAR REEDUCATION: CPT | Mod: GP

## 2024-12-30 NOTE — PROGRESS NOTES
Physical Therapy Treatment Note      Patient Name Loretta Walsh  MRN: [id number]  Today's Date: 12/30/2024  Time Calculation  Start Time: 1110  Stop Time: 1150  Time Calculation (min): 40 min    Insurance: Payor: MEDICARE / Plan: MEDICARE PART A AND B / Product Type: *No Product type* /  $449.68 APPLIED   Visit #: 6  Date of Onset:  DOS 10/29/24   -authorization required: no  -number of visits authorized  -authorization/ certification dates: 11/27/24-2/25/25 POC signed 11/27/24     Problem List Items Addressed This Visit             ICD-10-CM    Spinal stenosis of lumbar region without neurogenic claudication M48.061         General:  Referred by: SMILEY Kohler-CNP   Next MD appt: 1/20/25   Date of Last Surgery: 10/29/2024  Procedure: L2-L3, L3-L4 Extreme Lateral Interbody Fusion L2-S1 Extension Revision of Hardware  Post-Op Days: 62     Precautions: low fall risk  Several falls pre-op due to R knee buckling  No bending, lifting, twisting  10# lifting restriction    Subjective:  General:  No pain    Functional Progress:  Unable to carry anything on stairs    Compliant with HEP:  yes    Understanding of HEP: WNL    Pain  Pain assessment 0-10  Pain score: 0  Pain location: lumbar    Objective:  Therapeutic Exercise  25 minutes  see below  **indicates new exercises or progression  NP=not performed    Neuromuscular Re-education:  15 minutes  See below  **indicates new exercises or progression  NP=not performed    Other     Exercise Log:  Nustep L3 10'   Pelvic tilt 10x     Adductor sets 20x (HEP)  Hooklying hip abduction green 20x    Hooklying marching green 20x   Isometric hip flexion 10x (HEP)  LAQ 2# 10x BLE **  Seated hamstring curls green 10x2   DLS SLR BLE 10x   2# **  Sidelying hip abd 2# 10x BLE **  Clamshells 10x red **     Standing calf raises 10x2  airex   SLS BLE 10 sec 5x  airex   Side stepping balance beam 3x   Standing hip abd/ext 2#  "10x  **  Standing marching 2# 10x **  Side stepping 3x  NP  Step ups F/L 6\" 10x   Step downs 6\" 10x      DLS flex/ext green 10x2   Abdominal pull downs 3 way green 10x **     Education:  Instructed in progression of exercises    Assessment:  skilled intervention: exercise progression for strength    Patient would continue to benefit from skilled PT to address remaining functional, objective and subjective deficits to allow them to return to full independence with ADLs     Progressed with resistance on clamshells and inc resistance    Plan:  Inc tband for DLS flex/ext    "

## 2025-01-02 ENCOUNTER — TREATMENT (OUTPATIENT)
Dept: PHYSICAL THERAPY | Facility: CLINIC | Age: 75
End: 2025-01-02
Payer: MEDICARE

## 2025-01-02 DIAGNOSIS — M48.061 SPINAL STENOSIS OF LUMBAR REGION WITHOUT NEUROGENIC CLAUDICATION: ICD-10-CM

## 2025-01-02 PROCEDURE — 97110 THERAPEUTIC EXERCISES: CPT | Mod: GP

## 2025-01-02 PROCEDURE — 97112 NEUROMUSCULAR REEDUCATION: CPT | Mod: GP

## 2025-01-02 NOTE — PROGRESS NOTES
Physical Therapy Treatment Note      Patient Name Loretta Walsh  MRN: [id number]  Today's Date: 1/2/2025  Time Calculation  Start Time: 1130  Stop Time: 1210  Time Calculation (min): 40 min    Insurance: Payor: MEDICARE / Plan: MEDICARE PART A AND B / Product Type: *No Product type* /  $449.68 APPLIED   Visit #: 7  Date of Onset:  DOS 10/29/24   -authorization required: no  -authorization/ certification dates: 11/27/24-2/25/25 POC signed 11/27/24     Problem List Items Addressed This Visit             ICD-10-CM    Spinal stenosis of lumbar region without neurogenic claudication M48.061       General:  Referred by: SMILEY Kohler-CNP   Next MD appt: 1/20/25   Date of Last Surgery: 10/29/2024  Procedure: L2-L3, L3-L4 Extreme Lateral Interbody Fusion L2-S1 Extension Revision of Hardware  Post-Op Days: 65     Precautions: low fall risk  Several falls pre-op due to R knee buckling  No bending, lifting, twisting  10# lifting restriction    Subjective:  General:  Cont to have soreness only    Functional Progress:  Feels that she is near to full function at home    Compliant with HEP:  yes    Understanding of HEP: WNL    Pain  Pain assessment 0-10  Pain score: 0  Pain location: lumbar    Objective:  Therapeutic Exercise  25 minutes  see below  **indicates new exercises or progression  NP=not performed    Neuromuscular Re-education:  15 minutes  See below  **indicates new exercises or progression  NP=not performed    Other     Exercise Log:  Nustep L3 10'   Pelvic tilt 10x   (HEP)  Adductor sets 20x (HEP)  Hooklying hip abduction green 20x    Hooklying marching green 20x   Isometric hip flexion 10x (HEP)  LAQ 2# 10x BLE   Seated hamstring curls green 10x2   DLS SLR BLE 10x2   2#   Sidelying hip abd 2# 10x BLE   Clamshells 10x red      Standing calf raises 10x2  airex   SLS BLE 10 sec 5x  airex   Side stepping balance beam 3x   Heel/toe walk balance beam 2x  "**  Standing hip abd/ext 2# 10x    Standing marching 2# 10x   Side stepping 3x  NP  Step ups F/L 6\" 10x   Step downs 6\" 10x      DLS flex/ext blue 10x2 **  Abdominal pull downs 3 way blue 10x **    Education:  Instructed in progression of exercises  Issued blue tband to HEP    Assessment:  skilled intervention: exercise progression for lumbar stabilization    Patient would continue to benefit from skilled PT to address remaining functional, objective and subjective deficits to allow them to return to full independence with ADLs     Challenged with progression to heel/toe walk on balance beam due to rolling of L ankle    Plan:  Band with side stepping    "

## 2025-01-06 ENCOUNTER — TREATMENT (OUTPATIENT)
Dept: PHYSICAL THERAPY | Facility: CLINIC | Age: 75
End: 2025-01-06
Payer: MEDICARE

## 2025-01-06 DIAGNOSIS — M48.061 SPINAL STENOSIS OF LUMBAR REGION WITHOUT NEUROGENIC CLAUDICATION: ICD-10-CM

## 2025-01-06 PROCEDURE — 97112 NEUROMUSCULAR REEDUCATION: CPT | Mod: GP

## 2025-01-06 PROCEDURE — 97110 THERAPEUTIC EXERCISES: CPT | Mod: GP

## 2025-01-06 NOTE — PROGRESS NOTES
Physical Therapy Treatment Note      Patient Name Loretta Walsh  MRN: [id number]  Today's Date: 1/6/2025  Time Calculation  Start Time: 1115  Stop Time: 1155  Time Calculation (min): 40 min    Insurance: Payor: MEDICARE / Plan: MEDICARE PART A AND B / Product Type: *No Product type* /  $449.68 APPLIED   Visit #: 8  Date of Onset:  DOS 10/29/24   -authorization required: no  -authorization/ certification dates: 11/27/24-2/25/25 POC signed 11/27/24     Problem List Items Addressed This Visit             ICD-10-CM    Spinal stenosis of lumbar region without neurogenic claudication M48.061       General:  Referred by: SMILEY Kohler-CNP   Next MD appt: 1/20/25   Date of Last Surgery: 10/29/2024  Procedure: L2-L3, L3-L4 Extreme Lateral Interbody Fusion L2-S1 Extension Revision of Hardware  Post-Op Days: 69     Precautions: low fall risk  Several falls pre-op due to R knee buckling  No bending, lifting, twisting  10# lifting restriction    Subjective:  General:  Pain remains very low to none  Clamshells are painful in lateral thigh/hip    Functional Progress:  Cont to do well with function    Compliant with HEP:  yes    Understanding of HEP: WNL    Pain  Pain assessment 0-10  Pain score: 0  Pain location: lumbar     Objective:  Therapeutic Exercise  25 minutes  see below  **indicates new exercises or progression  NP=not performed    Neuromuscular Re-education:  15 minutes  See below  **indicates new exercises or progression  NP=not performed    Other      Exercise Log:  Nustep L3 10'   Pelvic tilt 10x   (HEP)  Adductor sets 20x (HEP)  Hooklying hip abduction green 20x    Hooklying marching green 20x   Isometric hip flexion 10x (HEP)  LAQ 2# 10x BLE   Seated hamstring curls green 10x2   DLS SLR BLE 10x2   2#   Sidelying hip abd 2# 10x2 BLE   Clamshells 10x red NP     Standing calf raises 10x2  airex   SLS BLE 10 sec 5x  airex   Side stepping balance beam  "3x   Heel/toe walk balance beam 2x   Standing hip abd/ext 2# 10x    Standing marching 2# 10x   Side stepping 3x  red **  Step ups F/L 6\" 10x   Step downs 6\" 10x      DLS flex/ext blue 10x2   Abdominal pull downs 3 way blue 10x     Education:  Instructed in progression of exercises    Assessment:  skilled intervention: exercise progression for strength    Patient would continue to benefit from skilled PT to address remaining functional, objective and subjective deficits to allow them to return to full independence with ADLs     Some rolling of L ankle with balance activities but able to self recover  Stopped clamshells due to pain    Plan:  1 more visit then discharge to [facility]    "

## 2025-01-09 ENCOUNTER — APPOINTMENT (OUTPATIENT)
Dept: PHYSICAL THERAPY | Facility: CLINIC | Age: 75
End: 2025-01-09
Payer: MEDICARE

## 2025-01-20 ENCOUNTER — OFFICE VISIT (OUTPATIENT)
Facility: CLINIC | Age: 75
End: 2025-01-20
Payer: MEDICARE

## 2025-01-20 VITALS
DIASTOLIC BLOOD PRESSURE: 86 MMHG | BODY MASS INDEX: 37.54 KG/M2 | SYSTOLIC BLOOD PRESSURE: 132 MMHG | TEMPERATURE: 97.3 F | WEIGHT: 204 LBS | HEIGHT: 62 IN | HEART RATE: 74 BPM

## 2025-01-20 DIAGNOSIS — Z98.1 S/P LUMBAR SPINAL FUSION: Primary | ICD-10-CM

## 2025-01-20 PROCEDURE — 3079F DIAST BP 80-89 MM HG: CPT | Performed by: STUDENT IN AN ORGANIZED HEALTH CARE EDUCATION/TRAINING PROGRAM

## 2025-01-20 PROCEDURE — 1036F TOBACCO NON-USER: CPT | Performed by: STUDENT IN AN ORGANIZED HEALTH CARE EDUCATION/TRAINING PROGRAM

## 2025-01-20 PROCEDURE — 99211 OFF/OP EST MAY X REQ PHY/QHP: CPT | Performed by: STUDENT IN AN ORGANIZED HEALTH CARE EDUCATION/TRAINING PROGRAM

## 2025-01-20 PROCEDURE — 1126F AMNT PAIN NOTED NONE PRSNT: CPT | Performed by: STUDENT IN AN ORGANIZED HEALTH CARE EDUCATION/TRAINING PROGRAM

## 2025-01-20 PROCEDURE — 1159F MED LIST DOCD IN RCRD: CPT | Performed by: STUDENT IN AN ORGANIZED HEALTH CARE EDUCATION/TRAINING PROGRAM

## 2025-01-20 PROCEDURE — 3075F SYST BP GE 130 - 139MM HG: CPT | Performed by: STUDENT IN AN ORGANIZED HEALTH CARE EDUCATION/TRAINING PROGRAM

## 2025-01-20 PROCEDURE — 3008F BODY MASS INDEX DOCD: CPT | Performed by: STUDENT IN AN ORGANIZED HEALTH CARE EDUCATION/TRAINING PROGRAM

## 2025-01-20 ASSESSMENT — PAIN SCALES - GENERAL: PAINLEVEL_OUTOF10: 0-NO PAIN

## 2025-01-20 ASSESSMENT — PATIENT HEALTH QUESTIONNAIRE - PHQ9
1. LITTLE INTEREST OR PLEASURE IN DOING THINGS: NOT AT ALL
SUM OF ALL RESPONSES TO PHQ9 QUESTIONS 1 & 2: 0
2. FEELING DOWN, DEPRESSED OR HOPELESS: NOT AT ALL

## 2025-01-20 NOTE — PROGRESS NOTES
[facility]  Department of Neurological Surgery  Post Operative Patient Visit      History of Present Illness:  Loretta Walsh is a 74 y.o. year old female who presents to the spine clinic in a post operative visit. Since surgery they are 2-3 months s/p L2-L3, L3-L4 Extreme Lateral Interbody Fusion L2-S1 Extension Revision of Hardware at [facility] on 10/29/24. She is doing great. All of her pain has resolved. I ordered her XR today and she will follow up as needed.       The above clinical summary has been dictated with voice recognition software. It has not been proofread for grammatical errors, typographical mistakes, or other semantic inconsistencies.    Thank you for visiting our office today. It was our pleasure to take part in your healthcare.     Do not hesitate to call with any questions regarding your plan of care after leaving at [phone] M-F 8am-4pm.     To clinicians, thank you very much for this kind referral. It is a privilege to partner with you in the care of your patients. My office would be delighted to assist you with any further consultations or with questions regarding the plan of care outlined. Do not hesitate to call the office or contact me directly.       Sincerely,      Dameon Jaime MD, [facility] , [facility]  Filemon Murphy Chair in Spinal Neurosurgery  Complex Spine Surgery Fellowship Director   of Neurological Surgery  [employer]  Phone: [phone]  Fax: [phone]        Scribe Attestation  By signing my name below, I, Fariha Quiroga   attest that this documentation has been prepared under the direction and in the presence of Dameon Jaime MD.

## 2025-02-17 ENCOUNTER — APPOINTMENT (OUTPATIENT)
Dept: RADIOLOGY | Facility: CLINIC | Age: 75
End: 2025-02-17
Payer: MEDICARE

## 2025-04-30 ENCOUNTER — OFFICE VISIT (OUTPATIENT)
Dept: ORTHOPEDIC SURGERY | Facility: CLINIC | Age: 75
End: 2025-04-30
Payer: MEDICARE

## 2025-04-30 DIAGNOSIS — M79.645 BILATERAL THUMB PAIN: Primary | ICD-10-CM

## 2025-04-30 DIAGNOSIS — M79.644 BILATERAL THUMB PAIN: Primary | ICD-10-CM

## 2025-04-30 DIAGNOSIS — M18.0 PRIMARY OSTEOARTHRITIS OF BOTH FIRST CARPOMETACARPAL JOINTS: ICD-10-CM

## 2025-04-30 PROCEDURE — 99213 OFFICE O/P EST LOW 20 MIN: CPT | Mod: 25 | Performed by: ORTHOPAEDIC SURGERY

## 2025-04-30 PROCEDURE — 2500000004 HC RX 250 GENERAL PHARMACY W/ HCPCS (ALT 636 FOR OP/ED): Performed by: ORTHOPAEDIC SURGERY

## 2025-04-30 PROCEDURE — 20600 DRAIN/INJ JOINT/BURSA W/O US: CPT | Mod: 50 | Performed by: ORTHOPAEDIC SURGERY

## 2025-04-30 PROCEDURE — 1159F MED LIST DOCD IN RCRD: CPT | Performed by: ORTHOPAEDIC SURGERY

## 2025-04-30 PROCEDURE — 99213 OFFICE O/P EST LOW 20 MIN: CPT | Performed by: ORTHOPAEDIC SURGERY

## 2025-04-30 PROCEDURE — 1036F TOBACCO NON-USER: CPT | Performed by: ORTHOPAEDIC SURGERY

## 2025-04-30 PROCEDURE — 1160F RVW MEDS BY RX/DR IN RCRD: CPT | Performed by: ORTHOPAEDIC SURGERY

## 2025-04-30 RX ORDER — TRIAMCINOLONE ACETONIDE 40 MG/ML
10 INJECTION, SUSPENSION INTRA-ARTICULAR; INTRAMUSCULAR
Status: COMPLETED | OUTPATIENT
Start: 2025-04-30 | End: 2025-04-30

## 2025-04-30 RX ORDER — LIDOCAINE HYDROCHLORIDE 20 MG/ML
0.5 INJECTION, SOLUTION INFILTRATION; PERINEURAL
Status: COMPLETED | OUTPATIENT
Start: 2025-04-30 | End: 2025-04-30

## 2025-04-30 RX ADMIN — TRIAMCINOLONE ACETONIDE 10 MG: 40 INJECTION, SUSPENSION INTRA-ARTICULAR; INTRAMUSCULAR at 08:15

## 2025-04-30 RX ADMIN — LIDOCAINE HYDROCHLORIDE 0.5 ML: 20 INJECTION, SOLUTION INFILTRATION; PERINEURAL at 08:15

## 2025-04-30 NOTE — PROGRESS NOTES
Subjective    Patient ID: Ludwin Walsh is a 74 y.o. female.    Chief Complaint: Follow-up of the Left Thumb (FUV BL 1ST CMC INJ LAST DONE 4/24/24) and Follow-up of the Right Thumb     Last Surgery: No surgery found  Last Surgery Date: No surgery found    HPI  The patient returns today for follow-up of her bilateral first CMC arthritis.  She last underwent Kenalog injections approximately a year ago.  She states she got 3 to 4 months worth of relief.  However she did not undergo any injection sooner since she was recovering from spine surgery.    Objective   Ortho Exam  The patient is in no acute distress.  Exam of her right hand and wrist reveals her skin envelope is intact.  She is tender at the first CMC joint.  She has a positive first CMC grind.  She has a negative Finkelstein test.  There is no evidence of a trigger thumb.    Exam of the patient's left hand and wrist reveals her skin envelope is intact.  She is tender at the first CMC joint.  She has a positive first CMC grind.  She has a negative Finkelstein test.  On the left side she has a more prominent first metacarpal radial subluxation of the first CMC joint.    Assessment/Plan   Encounter Diagnoses:  Bilateral thumb pain    Primary osteoarthritis of both first carpometacarpal joints    Patient has known bilateral first CMC arthritis.  She does not wish to undergo surgery at.  She wished undergo Kenalog injections in both wrists.    S Inj/Asp: bilateral thumb CMC on 4/30/2025 8:15 AM  Indications: pain and joint swelling  Details: 25 G needle, dorsal approach  Medications (Right): 10 mg triamcinolone acetonide 40 mg/mL; 0.5 mL lidocaine 20 mg/mL (2 %)  Medications (Left): 10 mg triamcinolone acetonide 40 mg/mL; 0.5 mL lidocaine 20 mg/mL (2 %)  Outcome: tolerated well, no immediate complications  Procedure, treatment alternatives, risks and benefits explained, specific risks discussed. Consent was given by the patient. Immediately prior to procedure a time  out was called to verify the correct patient, procedure, equipment, support staff and site/side marked as required. Patient was prepped and draped in the usual sterile fashion.         Patient was informed to takes about a week for the injections have an effect.  She otherwise will follow-up as her symptoms dictate.

## 2025-06-17 ENCOUNTER — APPOINTMENT (OUTPATIENT)
Dept: PRIMARY CARE | Facility: CLINIC | Age: 75
End: 2025-06-17
Payer: MEDICARE

## 2025-06-17 VITALS — SYSTOLIC BLOOD PRESSURE: 108 MMHG | DIASTOLIC BLOOD PRESSURE: 60 MMHG | BODY MASS INDEX: 37.68 KG/M2 | WEIGHT: 206 LBS

## 2025-06-17 DIAGNOSIS — I10 ESSENTIAL (PRIMARY) HYPERTENSION: ICD-10-CM

## 2025-06-17 DIAGNOSIS — N18.31 CKD STAGE G3A/A1, GFR 45-59 AND ALBUMIN CREATININE RATIO <30 MG/G (MULTI): ICD-10-CM

## 2025-06-17 DIAGNOSIS — R73.03 PREDIABETES: ICD-10-CM

## 2025-06-17 DIAGNOSIS — Z00.00 ROUTINE GENERAL MEDICAL EXAMINATION AT HEALTH CARE FACILITY: Primary | ICD-10-CM

## 2025-06-17 DIAGNOSIS — E66.01 MORBID (SEVERE) OBESITY DUE TO EXCESS CALORIES (MULTI): ICD-10-CM

## 2025-06-17 DIAGNOSIS — Z00.00 HEALTHCARE MAINTENANCE: ICD-10-CM

## 2025-06-17 DIAGNOSIS — Z12.31 ENCOUNTER FOR SCREENING MAMMOGRAM FOR MALIGNANT NEOPLASM OF BREAST: ICD-10-CM

## 2025-06-17 DIAGNOSIS — Z12.39 ENCOUNTER FOR SCREENING FOR MALIGNANT NEOPLASM OF BREAST, UNSPECIFIED SCREENING MODALITY: ICD-10-CM

## 2025-06-17 DIAGNOSIS — R21 RASH: ICD-10-CM

## 2025-06-17 DIAGNOSIS — Z78.0 ASYMPTOMATIC POSTMENOPAUSAL STATE: ICD-10-CM

## 2025-06-17 DIAGNOSIS — N18.31 STAGE 3A CHRONIC KIDNEY DISEASE (MULTI): ICD-10-CM

## 2025-06-17 DIAGNOSIS — F41.9 ANXIETY: ICD-10-CM

## 2025-06-17 DIAGNOSIS — E78.5 DYSLIPIDEMIA: ICD-10-CM

## 2025-06-17 DIAGNOSIS — M19.90 ARTHRITIS: ICD-10-CM

## 2025-06-17 PROCEDURE — 3078F DIAST BP <80 MM HG: CPT | Performed by: STUDENT IN AN ORGANIZED HEALTH CARE EDUCATION/TRAINING PROGRAM

## 2025-06-17 PROCEDURE — G2211 COMPLEX E/M VISIT ADD ON: HCPCS | Performed by: STUDENT IN AN ORGANIZED HEALTH CARE EDUCATION/TRAINING PROGRAM

## 2025-06-17 PROCEDURE — 99213 OFFICE O/P EST LOW 20 MIN: CPT | Performed by: STUDENT IN AN ORGANIZED HEALTH CARE EDUCATION/TRAINING PROGRAM

## 2025-06-17 PROCEDURE — 3074F SYST BP LT 130 MM HG: CPT | Performed by: STUDENT IN AN ORGANIZED HEALTH CARE EDUCATION/TRAINING PROGRAM

## 2025-06-17 PROCEDURE — 1159F MED LIST DOCD IN RCRD: CPT | Performed by: STUDENT IN AN ORGANIZED HEALTH CARE EDUCATION/TRAINING PROGRAM

## 2025-06-17 PROCEDURE — 1036F TOBACCO NON-USER: CPT | Performed by: STUDENT IN AN ORGANIZED HEALTH CARE EDUCATION/TRAINING PROGRAM

## 2025-06-17 PROCEDURE — G0439 PPPS, SUBSEQ VISIT: HCPCS | Performed by: STUDENT IN AN ORGANIZED HEALTH CARE EDUCATION/TRAINING PROGRAM

## 2025-06-17 PROCEDURE — 1160F RVW MEDS BY RX/DR IN RCRD: CPT | Performed by: STUDENT IN AN ORGANIZED HEALTH CARE EDUCATION/TRAINING PROGRAM

## 2025-06-17 PROCEDURE — 1170F FXNL STATUS ASSESSED: CPT | Performed by: STUDENT IN AN ORGANIZED HEALTH CARE EDUCATION/TRAINING PROGRAM

## 2025-06-17 RX ORDER — MELOXICAM 15 MG/1
15 TABLET ORAL DAILY
Qty: 90 TABLET | Refills: 1 | Status: SHIPPED | OUTPATIENT
Start: 2025-06-17

## 2025-06-17 RX ORDER — LISINOPRIL AND HYDROCHLOROTHIAZIDE 10; 12.5 MG/1; MG/1
1 TABLET ORAL DAILY
Qty: 90 TABLET | Refills: 1 | Status: SHIPPED | OUTPATIENT
Start: 2025-06-17

## 2025-06-17 RX ORDER — OMEPRAZOLE 20 MG/1
20 CAPSULE, DELAYED RELEASE ORAL 2 TIMES DAILY
Qty: 180 CAPSULE | Refills: 1 | Status: SHIPPED | OUTPATIENT
Start: 2025-06-17

## 2025-06-17 RX ORDER — NYSTATIN 100000 [USP'U]/G
1 POWDER TOPICAL 2 TIMES DAILY
Qty: 60 G | Refills: 2 | Status: SHIPPED | OUTPATIENT
Start: 2025-06-17 | End: 2026-06-17

## 2025-06-17 RX ORDER — ATORVASTATIN CALCIUM 40 MG/1
40 TABLET, FILM COATED ORAL DAILY
Qty: 90 TABLET | Refills: 1 | Status: SHIPPED | OUTPATIENT
Start: 2025-06-17

## 2025-06-17 RX ORDER — ESCITALOPRAM OXALATE 20 MG/1
20 TABLET ORAL DAILY
Qty: 90 TABLET | Refills: 1 | Status: SHIPPED | OUTPATIENT
Start: 2025-06-17

## 2025-06-17 ASSESSMENT — ACTIVITIES OF DAILY LIVING (ADL)
DRESSING: INDEPENDENT
MANAGING_FINANCES: INDEPENDENT
DOING_HOUSEWORK: INDEPENDENT
BATHING: INDEPENDENT
GROCERY_SHOPPING: INDEPENDENT
TAKING_MEDICATION: INDEPENDENT

## 2025-06-17 NOTE — PROGRESS NOTES
Subjective   Reason for Visit: Loretta Walsh is an 74 y.o. female here for a Medicare Wellness visit.          Reviewed all medications by prescribing practitioner or clinical pharmacist (such as prescriptions, OTCs, herbal therapies and supplements) and documented in the medical record.    HPI    Presents for follow-up and wellness visit.  Recently returned from road trip to [place]  Denies any new questions or concerns at this time.      Patient Care Team:  Topher Blackwood MD as PCP - General     Review of Systems    8 point review of systems is otherwise negative unless mentioned on HPI      Objective   Vitals:  There were no vitals taken for this visit.      Physical Exam    General: No acute distress  HEENT: EOMI  CV: Regular rate and rhythm, normal S1 and S2, no murmurs  Pulm: Clear to auscultation bilaterally, no wheezings, rales or rhonchi  Abd: Nondistended  MSK: 5/5 strength in all extremities  Skin: No rashes   Lymphatic: No lymphadenopathy    Assessment & Plan  Dyslipidemia    Orders:    atorvastatin (Lipitor) 40 mg tablet; Take 1 tablet (40 mg) by mouth once daily.    Anxiety    Orders:    escitalopram (Lexapro) 20 mg tablet; Take 1 tablet (20 mg) by mouth once daily.    Essential (primary) hypertension    Orders:    lisinopriL-hydrochlorothiazide 10-12.5 mg tablet; Take 1 tablet by mouth once daily. RESUME TAKING ON MONDAY, 11/4/2024    Arthritis    Orders:    meloxicam (Mobic) 15 mg tablet; Take 1 tablet (15 mg) by mouth once daily. DO NOT RESUME UNTIL FOLLOW UP WITH NEUROSURGERY    Rash    Orders:    nystatin (Mycostatin) 100,000 unit/gram powder; Apply 1 Application topically 2 times a day.    Healthcare maintenance    Orders:    omeprazole (PriLOSEC) 20 mg DR capsule; Take 1 capsule (20 mg) by mouth 2 times a day.    Asymptomatic postmenopausal state    Orders:    XR DEXA bone density; Future    Encounter for screening for malignant neoplasm of breast,  unspecified screening modality    Orders:    BI mammo bilateral screening tomosynthesis; Future    Prediabetes    Orders:    Hemoglobin A1C; Future    Encounter for screening mammogram for malignant neoplasm of breast    Orders:    BI mammo bilateral screening tomosynthesis; Future       Back pain  -Following with spine surgery and pain management  -10/29/24 s/p redo L2-5 decompression and fusion, L3-4 TLIF   -Steroids bursts have not been helpful  -Had side effects from gabapentin and stopped taking  -Has been receiving injections     Hypertension  -Continue lisinopril/hydrochlorothiazide 10/12.5 mg daily     Dyslipidemia  -Continue atorvastatin 40 mg daily  -Coronary artery calcium score of 190 in 8/2023     Depression  -After previous visit had decreased Lexapro to 10 mg daily, had return of symptoms and has since resumed 20 mg daily     Hand pain/thumb pain  -Has been on meloxicam as needed  -Has seen ortho-hand and received steroid injections     Healthcare maintenance  -Colonoscopy 2020, repeat interval of 10 years, had not been recommended for any further colonoscopies  -Mammogram 2/2024, given requisition for next year  -DEXA scan ordered  -Nystatin powder for rash underneath breasts and abdomen     RTC 6 months      This note was dictated by speech recognition. Minor errors in transcription may be present.

## 2025-06-21 LAB
EST. AVERAGE GLUCOSE BLD GHB EST-MCNC: 123 MG/DL
EST. AVERAGE GLUCOSE BLD GHB EST-SCNC: 6.8 MMOL/L
HBA1C MFR BLD: 5.9 %

## 2025-06-26 ENCOUNTER — APPOINTMENT (OUTPATIENT)
Dept: RADIOLOGY | Facility: CLINIC | Age: 75
End: 2025-06-26
Payer: MEDICARE

## 2025-06-26 VITALS — HEIGHT: 62 IN | WEIGHT: 206 LBS | BODY MASS INDEX: 37.91 KG/M2

## 2025-06-26 DIAGNOSIS — Z12.31 ENCOUNTER FOR SCREENING MAMMOGRAM FOR MALIGNANT NEOPLASM OF BREAST: ICD-10-CM

## 2025-06-26 DIAGNOSIS — Z12.39 ENCOUNTER FOR SCREENING FOR MALIGNANT NEOPLASM OF BREAST, UNSPECIFIED SCREENING MODALITY: ICD-10-CM

## 2025-06-26 PROCEDURE — 77063 BREAST TOMOSYNTHESIS BI: CPT | Performed by: RADIOLOGY

## 2025-06-26 PROCEDURE — 77067 SCR MAMMO BI INCL CAD: CPT

## 2025-06-26 PROCEDURE — 77067 SCR MAMMO BI INCL CAD: CPT | Performed by: RADIOLOGY

## 2025-07-08 ENCOUNTER — TELEPHONE (OUTPATIENT)
Dept: PRIMARY CARE | Facility: CLINIC | Age: 75
End: 2025-07-08
Payer: MEDICARE

## 2025-07-08 ENCOUNTER — HOSPITAL ENCOUNTER (EMERGENCY)
Facility: HOSPITAL | Age: 75
Discharge: HOME | End: 2025-07-09
Attending: EMERGENCY MEDICINE
Payer: MEDICARE

## 2025-07-08 DIAGNOSIS — N17.9 AKI (ACUTE KIDNEY INJURY): ICD-10-CM

## 2025-07-08 DIAGNOSIS — K57.92 DIVERTICULITIS: Primary | ICD-10-CM

## 2025-07-08 DIAGNOSIS — R10.9 ABDOMINAL PAIN, UNSPECIFIED ABDOMINAL LOCATION: Primary | ICD-10-CM

## 2025-07-08 DIAGNOSIS — R11.0 NAUSEA: ICD-10-CM

## 2025-07-08 PROCEDURE — 99285 EMERGENCY DEPT VISIT HI MDM: CPT | Performed by: EMERGENCY MEDICINE

## 2025-07-08 RX ORDER — AMOXICILLIN AND CLAVULANATE POTASSIUM 875; 125 MG/1; MG/1
875 TABLET, FILM COATED ORAL 2 TIMES DAILY
Qty: 20 TABLET | Refills: 0 | Status: SHIPPED | OUTPATIENT
Start: 2025-07-08 | End: 2025-07-18

## 2025-07-08 ASSESSMENT — PAIN DESCRIPTION - PAIN TYPE: TYPE: ACUTE PAIN

## 2025-07-08 ASSESSMENT — PAIN DESCRIPTION - DIRECTION: RADIATING_TOWARDS: FLANK

## 2025-07-08 ASSESSMENT — PAIN SCALES - GENERAL: PAINLEVEL_OUTOF10: 8

## 2025-07-08 ASSESSMENT — LIFESTYLE VARIABLES
EVER FELT BAD OR GUILTY ABOUT YOUR DRINKING: NO
HAVE PEOPLE ANNOYED YOU BY CRITICIZING YOUR DRINKING: NO
EVER HAD A DRINK FIRST THING IN THE MORNING TO STEADY YOUR NERVES TO GET RID OF A HANGOVER: NO
HAVE YOU EVER FELT YOU SHOULD CUT DOWN ON YOUR DRINKING: NO
TOTAL SCORE: 0

## 2025-07-08 ASSESSMENT — PAIN - FUNCTIONAL ASSESSMENT: PAIN_FUNCTIONAL_ASSESSMENT: 0-10

## 2025-07-08 ASSESSMENT — PAIN DESCRIPTION - LOCATION: LOCATION: BACK

## 2025-07-08 ASSESSMENT — PAIN DESCRIPTION - ORIENTATION: ORIENTATION: RIGHT

## 2025-07-08 NOTE — TELEPHONE ENCOUNTER
Patient is requesting a script for diverticulitis. Says has had this before and this is the start of it. Doesn't want to end up in the hospital again.

## 2025-07-09 ENCOUNTER — APPOINTMENT (OUTPATIENT)
Dept: RADIOLOGY | Facility: HOSPITAL | Age: 75
End: 2025-07-09
Payer: MEDICARE

## 2025-07-09 VITALS
DIASTOLIC BLOOD PRESSURE: 55 MMHG | OXYGEN SATURATION: 98 % | WEIGHT: 202 LBS | HEIGHT: 62 IN | BODY MASS INDEX: 37.17 KG/M2 | HEART RATE: 69 BPM | RESPIRATION RATE: 17 BRPM | TEMPERATURE: 97.7 F | SYSTOLIC BLOOD PRESSURE: 90 MMHG

## 2025-07-09 LAB
ALBUMIN SERPL BCP-MCNC: 3.9 G/DL (ref 3.4–5)
ALP SERPL-CCNC: 93 U/L (ref 33–136)
ALT SERPL W P-5'-P-CCNC: 11 U/L (ref 7–45)
ANION GAP SERPL CALC-SCNC: 11 MMOL/L (ref 10–20)
APPEARANCE UR: CLEAR
AST SERPL W P-5'-P-CCNC: 15 U/L (ref 9–39)
BASOPHILS # BLD AUTO: 0.07 X10*3/UL (ref 0–0.1)
BASOPHILS NFR BLD AUTO: 0.6 %
BILIRUB SERPL-MCNC: 0.6 MG/DL (ref 0–1.2)
BILIRUB UR STRIP.AUTO-MCNC: NEGATIVE MG/DL
BUN SERPL-MCNC: 22 MG/DL (ref 6–23)
CALCIUM SERPL-MCNC: 8.7 MG/DL (ref 8.6–10.3)
CHLORIDE SERPL-SCNC: 94 MMOL/L (ref 98–107)
CO2 SERPL-SCNC: 26 MMOL/L (ref 21–32)
COLOR UR: ABNORMAL
CREAT SERPL-MCNC: 1.29 MG/DL (ref 0.5–1.05)
EGFRCR SERPLBLD CKD-EPI 2021: 44 ML/MIN/1.73M*2
EOSINOPHIL # BLD AUTO: 0.2 X10*3/UL (ref 0–0.4)
EOSINOPHIL NFR BLD AUTO: 1.6 %
ERYTHROCYTE [DISTWIDTH] IN BLOOD BY AUTOMATED COUNT: 14.9 % (ref 11.5–14.5)
GLUCOSE SERPL-MCNC: 116 MG/DL (ref 74–99)
GLUCOSE UR STRIP.AUTO-MCNC: NORMAL MG/DL
HCT VFR BLD AUTO: 34.4 % (ref 36–46)
HGB BLD-MCNC: 11.1 G/DL (ref 12–16)
HOLD SPECIMEN: NORMAL
IMM GRANULOCYTES # BLD AUTO: 0.05 X10*3/UL (ref 0–0.5)
IMM GRANULOCYTES NFR BLD AUTO: 0.4 % (ref 0–0.9)
KETONES UR STRIP.AUTO-MCNC: NEGATIVE MG/DL
LEUKOCYTE ESTERASE UR QL STRIP.AUTO: NEGATIVE
LIPASE SERPL-CCNC: 24 U/L (ref 9–82)
LYMPHOCYTES # BLD AUTO: 1.92 X10*3/UL (ref 0.8–3)
LYMPHOCYTES NFR BLD AUTO: 15.8 %
MAGNESIUM SERPL-MCNC: 1.82 MG/DL (ref 1.6–2.4)
MCH RBC QN AUTO: 29.3 PG (ref 26–34)
MCHC RBC AUTO-ENTMCNC: 32.3 G/DL (ref 32–36)
MCV RBC AUTO: 91 FL (ref 80–100)
MONOCYTES # BLD AUTO: 1.5 X10*3/UL (ref 0.05–0.8)
MONOCYTES NFR BLD AUTO: 12.3 %
MUCOUS THREADS #/AREA URNS AUTO: NORMAL /LPF
NEUTROPHILS # BLD AUTO: 8.41 X10*3/UL (ref 1.6–5.5)
NEUTROPHILS NFR BLD AUTO: 69.3 %
NITRITE UR QL STRIP.AUTO: NEGATIVE
NRBC BLD-RTO: 0 /100 WBCS (ref 0–0)
PH UR STRIP.AUTO: 5 [PH]
PLATELET # BLD AUTO: 250 X10*3/UL (ref 150–450)
POTASSIUM SERPL-SCNC: 4 MMOL/L (ref 3.5–5.3)
PROT SERPL-MCNC: 6.7 G/DL (ref 6.4–8.2)
PROT UR STRIP.AUTO-MCNC: NEGATIVE MG/DL
RBC # BLD AUTO: 3.79 X10*6/UL (ref 4–5.2)
RBC # UR STRIP.AUTO: ABNORMAL MG/DL
RBC #/AREA URNS AUTO: NORMAL /HPF
SODIUM SERPL-SCNC: 127 MMOL/L (ref 136–145)
SP GR UR STRIP.AUTO: 1.01
UROBILINOGEN UR STRIP.AUTO-MCNC: NORMAL MG/DL
WBC # BLD AUTO: 12.2 X10*3/UL (ref 4.4–11.3)
WBC #/AREA URNS AUTO: NORMAL /HPF

## 2025-07-09 PROCEDURE — 80053 COMPREHEN METABOLIC PANEL: CPT | Performed by: EMERGENCY MEDICINE

## 2025-07-09 PROCEDURE — 96361 HYDRATE IV INFUSION ADD-ON: CPT

## 2025-07-09 PROCEDURE — 96374 THER/PROPH/DIAG INJ IV PUSH: CPT | Mod: 59

## 2025-07-09 PROCEDURE — 81001 URINALYSIS AUTO W/SCOPE: CPT | Performed by: EMERGENCY MEDICINE

## 2025-07-09 PROCEDURE — 74177 CT ABD & PELVIS W/CONTRAST: CPT

## 2025-07-09 PROCEDURE — 83735 ASSAY OF MAGNESIUM: CPT

## 2025-07-09 PROCEDURE — 2500000004 HC RX 250 GENERAL PHARMACY W/ HCPCS (ALT 636 FOR OP/ED)

## 2025-07-09 PROCEDURE — 36415 COLL VENOUS BLD VENIPUNCTURE: CPT | Performed by: EMERGENCY MEDICINE

## 2025-07-09 PROCEDURE — 74177 CT ABD & PELVIS W/CONTRAST: CPT | Mod: FOREIGN READ | Performed by: RADIOLOGY

## 2025-07-09 PROCEDURE — 83690 ASSAY OF LIPASE: CPT

## 2025-07-09 PROCEDURE — 2550000001 HC RX 255 CONTRASTS

## 2025-07-09 PROCEDURE — 85025 COMPLETE CBC W/AUTO DIFF WBC: CPT | Performed by: EMERGENCY MEDICINE

## 2025-07-09 RX ORDER — ONDANSETRON 4 MG/1
4 TABLET, ORALLY DISINTEGRATING ORAL EVERY 8 HOURS PRN
Qty: 20 TABLET | Refills: 0 | Status: SHIPPED | OUTPATIENT
Start: 2025-07-09 | End: 2025-07-16

## 2025-07-09 RX ORDER — OXYCODONE AND ACETAMINOPHEN 5; 325 MG/1; MG/1
1 TABLET ORAL EVERY 8 HOURS PRN
Qty: 5 TABLET | Refills: 0 | Status: SHIPPED | OUTPATIENT
Start: 2025-07-09 | End: 2025-07-12

## 2025-07-09 RX ORDER — MORPHINE SULFATE 4 MG/ML
4 INJECTION, SOLUTION INTRAMUSCULAR; INTRAVENOUS ONCE
Status: COMPLETED | OUTPATIENT
Start: 2025-07-09 | End: 2025-07-09

## 2025-07-09 RX ADMIN — MORPHINE SULFATE 4 MG: 4 INJECTION, SOLUTION INTRAMUSCULAR; INTRAVENOUS at 02:45

## 2025-07-09 RX ADMIN — IOHEXOL 80 ML: 350 INJECTION, SOLUTION INTRAVENOUS at 02:30

## 2025-07-09 RX ADMIN — SODIUM CHLORIDE 1000 ML: 0.9 INJECTION, SOLUTION INTRAVENOUS at 01:27

## 2025-07-09 ASSESSMENT — PAIN DESCRIPTION - LOCATION
LOCATION: ABDOMEN
LOCATION: ABDOMEN

## 2025-07-09 ASSESSMENT — PAIN DESCRIPTION - ORIENTATION: ORIENTATION: MID;LOWER

## 2025-07-09 ASSESSMENT — PAIN DESCRIPTION - DESCRIPTORS: DESCRIPTORS: SHARP

## 2025-07-09 ASSESSMENT — PAIN DESCRIPTION - PROGRESSION: CLINICAL_PROGRESSION: GRADUALLY IMPROVING

## 2025-07-09 ASSESSMENT — PAIN DESCRIPTION - PAIN TYPE: TYPE: ACUTE PAIN

## 2025-07-09 ASSESSMENT — PAIN - FUNCTIONAL ASSESSMENT
PAIN_FUNCTIONAL_ASSESSMENT: 0-10
PAIN_FUNCTIONAL_ASSESSMENT: 0-10

## 2025-07-09 ASSESSMENT — PAIN SCALES - GENERAL
PAINLEVEL_OUTOF10: 3
PAINLEVEL_OUTOF10: 8

## 2025-07-09 NOTE — ED TRIAGE NOTES
Pt presents to ED with c/o right back to flank/abdominal pain since Friday. Pt states PCP wrote her abx script thinking its dialysis, pt only took 1 pill. Pt denies vomiting or diarrhea, some nausea.

## 2025-07-09 NOTE — ED PROVIDER NOTES
HPI   Chief Complaint   Patient presents with    Flank Pain       74-year-old female presenting to the emergency department from home with her  for evaluation of abdominal pain.  Patient reports intermittent left-sided abdominal pain over the past 5 days.  Endorses nausea without vomiting.  States she contacted her primary care physician yesterday and was given a prescription of Augmentin due to a history of diverticulitis.  Denies fevers, chills, headache, lightheadedness/dizziness, cough/cold symptoms, shortness of breath, chest pain, changes in urinary or bowel habits.  Last normal bowel movement was reported earlier this morning.  No reported smoking, alcohol, or illicit drug use.              Patient History   Medical History[1]  Surgical History[2]  Family History[3]  Social History[4]    Physical Exam   ED Triage Vitals   Temperature Heart Rate Respirations BP   07/08/25 2324 07/08/25 2320 07/08/25 2320 07/08/25 2320   36.5 °C (97.7 °F) 74 18 145/78      Pulse Ox Temp src Heart Rate Source Patient Position   07/08/25 2320 -- 07/09/25 0245 07/09/25 0245   98 %  Monitor Lying      BP Location FiO2 (%)     07/09/25 0245 --     Right arm        Physical Exam  Vitals and nursing note reviewed.   Constitutional:       Appearance: Normal appearance.   HENT:      Head: Atraumatic.      Nose: Nose normal.      Mouth/Throat:      Mouth: Mucous membranes are moist.   Eyes:      Extraocular Movements: Extraocular movements intact.      Conjunctiva/sclera: Conjunctivae normal.   Cardiovascular:      Rate and Rhythm: Normal rate and regular rhythm.   Pulmonary:      Effort: Pulmonary effort is normal.      Breath sounds: Normal breath sounds.   Abdominal:      General: Abdomen is flat. Bowel sounds are normal.      Palpations: Abdomen is soft.      Tenderness: There is abdominal tenderness in the left lower quadrant. There is no right CVA tenderness, left CVA tenderness, guarding or rebound.   Musculoskeletal:          General: Normal range of motion.      Cervical back: Neck supple.   Skin:     General: Skin is warm and dry.      Capillary Refill: Capillary refill takes less than 2 seconds.   Neurological:      Mental Status: She is alert and oriented to person, place, and time.   Psychiatric:         Mood and Affect: Mood normal.           ED Course & MDM   Diagnoses as of 07/11/25 1548   Diverticulitis   Nausea   SHALONDA (acute kidney injury)                 No data recorded     Sharron Coma Scale Score: 15 (07/09/25 0036 : Scarlett Whalen RN)                           Medical Decision Making  74-year-old female presenting to the emergency department from home with her  for evaluation of abdominal pain.  Vital signs reviewed and stable.  Patient is overall well-appearing and not in any acute distress.  She is not ill or toxic in appearance.  Abdomen is soft, nondistended, with equal bowel sounds throughout.  There is mild tenderness to palpation in the left lower quadrant.  No guarding or rebound.  No signs of peritonitis.  No CVA tenderness bilaterally.  1L NS bolus and 4 mg Zofran administered intravenously.  CBC remarkable for mild leukocytosis 12.2 and chronic anemia with stable hemoglobin 11.1 and hemoglobin 34.4. CMP remarkable for hyponatremia 127, hypochloremia 94, elevated creatinine 1.29 and EGFR 44 indicating a mild SHALONDA. Mag WNL. Lipase 24. UA without indication of acute urinary tract infection. CT abdomen and pelvis remarkable for sigmoid colon diverticulitis without evidence of abscess or perforation. A large hiatal hernia was also noted.  Patient was informed of lab and imaging findings, including incidentals, all questions and concerns were answered.  She endorsed marked improvement in symptoms after medication. She is tolerating p.o.  She was already initiated on appropriate antibiotic therapy, advised to continue as prescribed.  Encouraged increasing oral hydration and a clear liquid diet over the next  several days.  Advised close follow-up with her primary care physician within 2-3 days regarding today's emergency department visit.  Strict return precautions were given to return to the emergency department with any new or worsening symptoms.  Patient was agreeable to plan of care and discharged in stable condition.        Procedure  Procedures         [1]   Past Medical History:  Diagnosis Date    Agatston coronary artery calcium score between 100 and 199     2023--Coronary artery calcium score of 190.68*    Anxiety disorder, unspecified     Anxiety    Arthritis     Chronic kidney disease, stage 3a (Multi)     GERD (gastroesophageal reflux disease)     Glaucoma     Hypertension     Major depressive disorder, single episode, in full remission     Depression, major, in remission    Morbid (severe) obesity due to excess calories (Multi)     Nephrolithiasis     2023-nonobstructing 2 mm left renal calculus.    Osteopenia     Peripheral neuropathy     Personal history of cervical dysplasia     History of cervical dysplasia    Spinal stenosis, site unspecified 2022    Foraminal stenosis of lumbar region    Vertigo    [2]   Past Surgical History:  Procedure Laterality Date    CARPAL TUNNEL RELEASE Bilateral 2018    Neuroplasty Decompression Median Nerve At Carpal Tunnel     SECTION, LOW TRANSVERSE      COLONOSCOPY      DILATION AND CURETTAGE OF UTERUS      EYE SURGERY  2018    Eye Surgery    HYSTEROSCOPY      KNEE ARTHROPLASTY Bilateral     LUMBAR FUSION      OTHER SURGICAL HISTORY  10/05/2020    Loop electrosurgical excision procedure   [3]   Family History  Problem Relation Name Age of Onset    Ovarian cancer Mother      Diabetes Father      Dementia Father     [4]   Social History  Tobacco Use    Smoking status: Never    Smokeless tobacco: Never   Vaping Use    Vaping status: Never Used   Substance Use Topics    Alcohol use: Yes     Comment: Rarely    Drug use: Never        Juliana  NOHELIA Scanlon  07/11/25 1551

## 2025-07-09 NOTE — DISCHARGE INSTRUCTIONS
Continue Augmentin as prescribed by your primary care provider.  Prescribed Zofran as needed for nausea and vomiting.  Recommend scheduling a follow-up appointment within 2 to 3 days with your primary care provider regarding today's emergency department visit.  Return to the emergency department with any new or worsening symptoms including increasing pain, fevers, chills, severe nausea and vomiting, inability to tolerate food and fluids, bloody stool, lightheadedness/dizziness.

## 2025-07-18 ENCOUNTER — HOSPITAL ENCOUNTER (OUTPATIENT)
Dept: RADIOLOGY | Facility: CLINIC | Age: 75
Discharge: HOME | End: 2025-07-18
Payer: MEDICARE

## 2025-07-18 DIAGNOSIS — Z78.0 ASYMPTOMATIC POSTMENOPAUSAL STATE: ICD-10-CM

## 2025-07-18 PROCEDURE — 77080 DXA BONE DENSITY AXIAL: CPT

## 2025-07-18 PROCEDURE — 77080 DXA BONE DENSITY AXIAL: CPT | Performed by: RADIOLOGY

## 2025-12-16 ENCOUNTER — APPOINTMENT (OUTPATIENT)
Dept: PRIMARY CARE | Facility: CLINIC | Age: 75
End: 2025-12-16
Payer: MEDICARE

## (undated) DEVICE — SPONGE, LAP, XRAY DECT, SC+RFID, 12X12, STERILE

## (undated) DEVICE — DRAPE, C-ARM IMAGE

## (undated) DEVICE — Device

## (undated) DEVICE — SPHERE, STEALTHSTATION, 5-PK

## (undated) DEVICE — EVACUATOR, WOUND, CLOSED, 3 SPRING, 400 CC, Y CONNECTING TUBE

## (undated) DEVICE — TUBING, SMOKE EVAC, 3/8 X 10 FT

## (undated) DEVICE — PROTECTOR, NERVE, ULNAR, PINK

## (undated) DEVICE — SPONGE GAUZE, XRAY SC+RFID, 4X4 16 PLY, STERILE

## (undated) DEVICE — KIT, PATIENT CARE, JACKSON TABLE W/PRONE-SAFE HEADREST

## (undated) DEVICE — CLOSURE SYSTEM, DERMABOND, PRINEO, 22CM, STERILE

## (undated) DEVICE — MODULE, NEEDLE EMG M5

## (undated) DEVICE — DRAPE, SHEET, FAN FOLDED, HALF, 44 X 58 IN, DISPOSABLE, LF, STERILE

## (undated) DEVICE — DRAIN, WOUND, ROUND, W/TROCAR, HOLE PATTERN, 10 IN, MEDIUM, 1/8 X 49 IN

## (undated) DEVICE — ACCESS KIT, MAXCESS 4 SURGICAL

## (undated) DEVICE — COVER, TABLE, UHC

## (undated) DEVICE — CATHETER TRAY, SURESTEP, 16FR, URINE METER W/STATLOCK

## (undated) DEVICE — ELECTRODE, ELECTROSURGICAL, BLADE, INSULATED, ENT/IMA, STERILE

## (undated) DEVICE — TIP,  ELECTRODE COATED INSULATED, EXTENDED, LF

## (undated) DEVICE — WOUND SYSTEM, DEBRIDEMENT & CLEANING, O.R DUOPAK

## (undated) DEVICE — PAD, GROUNDING, ELECTROSURGICAL, W/9 FT CABLE, POLYHESIVE II, ADULT, LF

## (undated) DEVICE — APPLICATOR, CHLORAPREP, W/ORANGE TINT, 26ML

## (undated) DEVICE — SUTURE, VICRYL, 0, 18 IN, CT-1, UNDYED

## (undated) DEVICE — INSTRUMENT, DISSECTING, ENDOSCOPIC, PEANUT, 5 MM, STERILE

## (undated) DEVICE — TAPE, SILK, DURAPORE, 3 IN X 10 YD, LF

## (undated) DEVICE — MANIFOLD, 4 PORT NEPTUNE STANDARD

## (undated) DEVICE — COVER, CART, 45 X 27 X 48 IN, CLEAR

## (undated) DEVICE — SEALANT, HEMOSTATIC, FLOSEAL, 10 ML

## (undated) DEVICE — KIT, XLIF NVM5 DISP

## (undated) DEVICE — DRAPE, C-ARMOR KIT

## (undated) DEVICE — MARKER, SKIN, RULER AND LABEL PACK, CUSTOM

## (undated) DEVICE — SPONGE, HEMOSTATIC, GELATIN, SURGIFOAM, 8 X 12.5 CM X 10 MM